# Patient Record
Sex: MALE | Race: BLACK OR AFRICAN AMERICAN | Employment: OTHER | URBAN - METROPOLITAN AREA
[De-identification: names, ages, dates, MRNs, and addresses within clinical notes are randomized per-mention and may not be internally consistent; named-entity substitution may affect disease eponyms.]

---

## 2017-01-06 PROBLEM — Z78.9 STATIN INTOLERANCE: Chronic | Status: ACTIVE | Noted: 2017-01-06

## 2017-09-30 ENCOUNTER — APPOINTMENT (OUTPATIENT)
Dept: GENERAL RADIOLOGY | Age: 72
End: 2017-09-30
Payer: MEDICARE

## 2017-09-30 ENCOUNTER — APPOINTMENT (OUTPATIENT)
Dept: CT IMAGING | Age: 72
End: 2017-09-30
Payer: MEDICARE

## 2017-09-30 ENCOUNTER — HOSPITAL ENCOUNTER (EMERGENCY)
Age: 72
Discharge: HOME | End: 2017-09-30
Payer: MEDICARE

## 2017-09-30 VITALS
RESPIRATION RATE: 18 BRPM | SYSTOLIC BLOOD PRESSURE: 188 MMHG | OXYGEN SATURATION: 95 % | WEIGHT: 210 LBS | BODY MASS INDEX: 30.06 KG/M2 | HEART RATE: 74 BPM | TEMPERATURE: 98.8 F | HEIGHT: 70 IN | DIASTOLIC BLOOD PRESSURE: 95 MMHG

## 2017-09-30 DIAGNOSIS — M54.89 OTHER BACK PAIN, UNSPECIFIED CHRONICITY: Primary | ICD-10-CM

## 2017-09-30 PROCEDURE — 99283 EMERGENCY DEPT VISIT LOW MDM: CPT

## 2017-09-30 PROCEDURE — 74011250636 HC RX REV CODE- 250/636

## 2017-09-30 PROCEDURE — 74011250637 HC RX REV CODE- 250/637

## 2017-09-30 PROCEDURE — 96372 THER/PROPH/DIAG INJ SC/IM: CPT

## 2017-09-30 PROCEDURE — 71020 XR CHEST PA LAT: CPT

## 2017-09-30 PROCEDURE — 74176 CT ABD & PELVIS W/O CONTRAST: CPT

## 2017-09-30 RX ORDER — METHOCARBAMOL 750 MG/1
750 TABLET, FILM COATED ORAL 4 TIMES DAILY
Qty: 12 TAB | Refills: 0 | Status: SHIPPED | OUTPATIENT
Start: 2017-09-30 | End: 2018-09-21

## 2017-09-30 RX ORDER — METHOCARBAMOL 100 MG/ML
1000 INJECTION, SOLUTION INTRAMUSCULAR; INTRAVENOUS
Status: DISCONTINUED | OUTPATIENT
Start: 2017-09-30 | End: 2017-09-30

## 2017-09-30 RX ORDER — KETOROLAC TROMETHAMINE 30 MG/ML
60 INJECTION, SOLUTION INTRAMUSCULAR; INTRAVENOUS
Status: COMPLETED | OUTPATIENT
Start: 2017-09-30 | End: 2017-09-30

## 2017-09-30 RX ORDER — METHOCARBAMOL 500 MG/1
1500 TABLET, FILM COATED ORAL
Status: COMPLETED | OUTPATIENT
Start: 2017-09-30 | End: 2017-09-30

## 2017-09-30 RX ADMIN — METHOCARBAMOL 1500 MG: 500 TABLET ORAL at 07:57

## 2017-09-30 RX ADMIN — KETOROLAC TROMETHAMINE 60 MG: 30 INJECTION, SOLUTION INTRAMUSCULAR at 07:36

## 2017-09-30 NOTE — ED TRIAGE NOTES
Pt arrives to ED complaining of middle back pain that started yesterday. Pt denies any injury, radicular symptoms, or SOB.

## 2017-09-30 NOTE — ED PROVIDER NOTES
HPI Comments: 72-year-old male complaining of left flank pain. Patient is started yesterday feels like a cold not in my back\". No fevers or chills no nausea vomiting or diarrhea and hematuria no history of kidney stones. No history of recent injury. Patient is a 73 y.o. male presenting with back pain. The history is provided by the patient. Back Pain    This is a new problem. The problem occurs constantly. Patient reports not work related injury. The pain is associated with no known injury. The pain is present in the right side. The quality of the pain is described as stabbing. The pain does not radiate. The pain is at a severity of 10/10. The pain is severe.         Past Medical History:   Diagnosis Date    Anemia, unspecified     Arthritis     RA; spine/ general joints    Diabetes ([address] dx 1995    type 2; checks BS in pm; denies hypo S/S; A1C in Nov '12- 7.8; qhlmv=005- 2 hrs pc (11/28/12)    Hypertension     well controlled with meds    Iron deficiency anemia secondary to inadequate dietary iron intake 3/6/2015    Rash and other nonspecific skin eruption     Rheumatoid arthritis ([address] 3/6/2015    Statin intolerance 1/6/2017    Type II diabetes mellitus, uncontrolled ([address] 8/17/2015    Type II or unspecified type diabetes mellitus without mention of complication, not stated as uncontrolled 3/6/2015    Type II or unspecified type diabetes mellitus without mention of complication, uncontrolled     Unspecified essential hypertension 3/9/2015       Past Surgical History:   Procedure Laterality Date    HX COLONOSCOPY      HX HEENT  age 17    R eye prosthesis    HX ORTHOPAEDIC      vert         Family History:   Problem Relation Age of Onset   Enzo Holms Arthritis-rheumatoid Mother     Cancer Father      Prostate CA    Cancer Sister      breast    Hypertension Brother     Diabetes Brother     Arthritis-osteo Brother     Arthritis-osteo Sister     Arthritis-osteo Sister        Social History     Social History    Marital status:      Spouse name: N/A    Number of children: N/A    Years of education: N/A     Occupational History    Not on file. Social History Main Topics    Smoking status: Never Smoker    Smokeless tobacco: Former User    Alcohol use No    Drug use: Yes     Special: Prescription, OTC    Sexual activity: Not on file     Other Topics Concern    Not on file     Social History Narrative         ALLERGIES: Advicor [niacin-lovastatin]; Lescol [fluvastatin]; and Lipitor [atorvastatin]    Review of Systems   Constitutional: Negative. Negative for activity change. HENT: Negative. Eyes: Negative. Respiratory: Negative. Cardiovascular: Negative. Gastrointestinal: Negative. Genitourinary: Negative. Musculoskeletal: Positive for back pain. Skin: Negative. Neurological: Negative. Psychiatric/Behavioral: Negative. All other systems reviewed and are negative. Vitals:    09/30/17 0535   BP: 161/84   Pulse: 78   Resp: 16   Temp: 98.8 °F (37.1 °C)   SpO2: 96%   Weight: 95.3 kg (210 lb)   Height: 5' 10\" (1.778 m)            Physical Exam   Constitutional: He is oriented to person, place, and time. He appears well-developed and well-nourished. No distress. HENT:   Head: Normocephalic and atraumatic. Right Ear: External ear normal.   Left Ear: External ear normal.   Nose: Nose normal.   Mouth/Throat: Oropharynx is clear and moist. No oropharyngeal exudate. Eyes: Conjunctivae and EOM are normal. Pupils are equal, round, and reactive to light. Right eye exhibits no discharge. Left eye exhibits no discharge. No scleral icterus. Neck: Normal range of motion. Neck supple. No JVD present. No tracheal deviation present. Cardiovascular: Normal rate, regular rhythm and intact distal pulses. Pulmonary/Chest: Effort normal and breath sounds normal. No stridor. No respiratory distress. He has no wheezes. He exhibits no tenderness. Abdominal: Soft.  Bowel sounds are normal. He exhibits no distension and no mass. There is no tenderness. Musculoskeletal: Normal range of motion. He exhibits no edema. Thoracic back: He exhibits tenderness and pain. Back:    Neurological: He is alert and oriented to person, place, and time. No cranial nerve deficit. Skin: Skin is warm and dry. No rash noted. He is not diaphoretic. No erythema. No pallor. Psychiatric: He has a normal mood and affect. His behavior is normal. Thought content normal.   Nursing note and vitals reviewed. MDM  Number of Diagnoses or Management Options  Other back pain, unspecified chronicity:   Diagnosis management comments: nno injury no signs of bony injury.        Amount and/or Complexity of Data Reviewed  Clinical lab tests: ordered and reviewed  Tests in the radiology section of CPT®: ordered and reviewed      ED Course       Procedures

## 2017-09-30 NOTE — ED NOTES
I have reviewed discharge instructions with the patient. The patient verbalized understanding. Patient left ED via Discharge Method: ambulatory to Home with wife. Opportunity for questions and clarification provided. Patient given 1 scripts.

## 2017-09-30 NOTE — ED NOTES
Pt resting in bed with family at bedside. Pt denies any additional needs at this time. Pt respirations even and unlabored.

## 2017-11-02 ENCOUNTER — PATIENT OUTREACH (OUTPATIENT)
Dept: CASE MANAGEMENT | Age: 72
End: 2017-11-02

## 2017-11-02 NOTE — PROGRESS NOTES
11/2/17 POC Discussed with Pt. pt verbalized that he hsa been doing well since he recieved help from [facility] to pay for his medication. nurse encourage to continue to take Vit C tabs to reduce the risk of sujatha the flu, understanding verbalized. will be seeing PCP in 3 weeks as verbalized by pt. This will be nurses last out reach, understanding verbalized.

## 2017-12-06 PROBLEM — R27.8 DECREASED DEXTERITY: Chronic | Status: ACTIVE | Noted: 2017-12-06

## 2018-03-14 PROBLEM — E11.40 TYPE 2 DIABETES MELLITUS WITH DIABETIC NEUROPATHY (HCC): Status: ACTIVE | Noted: 2018-03-14

## 2018-04-04 ENCOUNTER — HOSPITAL ENCOUNTER (OUTPATIENT)
Dept: CT IMAGING | Age: 73
Discharge: HOME | End: 2018-04-04
Payer: MEDICARE

## 2018-04-04 DIAGNOSIS — J98.6 ELEVATED DIAPHRAGM: ICD-10-CM

## 2018-04-04 DIAGNOSIS — R06.2 WHEEZING ON RIGHT SIDE OF CHEST ON EXHALATION: ICD-10-CM

## 2018-04-04 PROCEDURE — 71260 CT THORAX DX C+: CPT

## 2018-04-04 PROCEDURE — 74011000258 HC RX REV CODE- 258

## 2018-04-04 PROCEDURE — 74011636320 HC RX REV CODE- 636/320

## 2018-04-04 RX ORDER — SODIUM CHLORIDE 0.9 % (FLUSH) 0.9 %
10 SYRINGE (ML) INJECTION
Status: COMPLETED | OUTPATIENT
Start: 2018-04-04 | End: 2018-04-04

## 2018-04-04 RX ADMIN — SODIUM CHLORIDE 100 ML: 900 INJECTION, SOLUTION INTRAVENOUS at 08:52

## 2018-04-04 RX ADMIN — Medication 10 ML: at 08:52

## 2018-04-04 RX ADMIN — IOPAMIDOL 80 ML: 755 INJECTION, SOLUTION INTRAVENOUS at 08:52

## 2018-04-09 PROBLEM — J98.6 CHRONICALLY ELEVATED HEMIDIAPHRAGM: Status: ACTIVE | Noted: 2018-04-09

## 2018-04-09 PROBLEM — J98.6 CHRONICALLY ELEVATED HEMIDIAPHRAGM: Chronic | Status: ACTIVE | Noted: 2018-04-09

## 2018-04-27 ENCOUNTER — HOSPITAL ENCOUNTER (OUTPATIENT)
Dept: CT IMAGING | Age: 73
Discharge: HOME | End: 2018-04-27
Payer: MEDICARE

## 2018-04-27 DIAGNOSIS — N28.89 LEFT RENAL MASS: ICD-10-CM

## 2018-04-27 PROCEDURE — 74011636320 HC RX REV CODE- 636/320: Performed by: NURSE PRACTITIONER

## 2018-04-27 PROCEDURE — 74178 CT ABD&PLV WO CNTR FLWD CNTR: CPT

## 2018-04-27 PROCEDURE — 74011000258 HC RX REV CODE- 258: Performed by: NURSE PRACTITIONER

## 2018-04-27 RX ORDER — SODIUM CHLORIDE 0.9 % (FLUSH) 0.9 %
10 SYRINGE (ML) INJECTION
Status: COMPLETED | OUTPATIENT
Start: 2018-04-27 | End: 2018-04-27

## 2018-04-27 RX ADMIN — Medication 10 ML: at 08:19

## 2018-04-27 RX ADMIN — IOPAMIDOL 100 ML: 755 INJECTION, SOLUTION INTRAVENOUS at 08:19

## 2018-04-27 RX ADMIN — SODIUM CHLORIDE 100 ML: 900 INJECTION, SOLUTION INTRAVENOUS at 08:19

## 2018-05-01 PROBLEM — N28.89 RENAL MASS, LEFT: Status: ACTIVE | Noted: 2018-05-01

## 2018-05-01 PROBLEM — N28.89 RENAL MASS, LEFT: Chronic | Status: ACTIVE | Noted: 2018-05-01

## 2018-10-15 ENCOUNTER — HOSPITAL ENCOUNTER (EMERGENCY)
Age: 73
Discharge: HOME | End: 2018-10-15
Payer: MEDICARE

## 2018-10-15 ENCOUNTER — APPOINTMENT (OUTPATIENT)
Dept: GENERAL RADIOLOGY | Age: 73
End: 2018-10-15
Payer: MEDICARE

## 2018-10-15 VITALS
TEMPERATURE: 99.9 F | BODY MASS INDEX: 31.21 KG/M2 | HEART RATE: 97 BPM | SYSTOLIC BLOOD PRESSURE: 134 MMHG | WEIGHT: 218 LBS | DIASTOLIC BLOOD PRESSURE: 78 MMHG | RESPIRATION RATE: 18 BRPM | HEIGHT: 70 IN | OXYGEN SATURATION: 93 %

## 2018-10-15 DIAGNOSIS — K31.9 NSAID-ASSOCIATED GASTROPATHY: ICD-10-CM

## 2018-10-15 DIAGNOSIS — T39.395A NSAID-ASSOCIATED GASTROPATHY: ICD-10-CM

## 2018-10-15 DIAGNOSIS — E11.40 TYPE 2 DIABETES MELLITUS WITH DIABETIC NEUROPATHY, WITH LONG-TERM CURRENT USE OF INSULIN (HCC): ICD-10-CM

## 2018-10-15 DIAGNOSIS — Z79.4 TYPE 2 DIABETES MELLITUS WITH DIABETIC NEUROPATHY, WITH LONG-TERM CURRENT USE OF INSULIN (HCC): ICD-10-CM

## 2018-10-15 DIAGNOSIS — R10.13 ABDOMINAL PAIN, EPIGASTRIC: Primary | ICD-10-CM

## 2018-10-15 LAB
ALBUMIN SERPL-MCNC: 3.4 G/DL (ref 3.2–4.6)
ALBUMIN/GLOB SERPL: 1 {RATIO} (ref 1.2–3.5)
ALP SERPL-CCNC: 63 U/L (ref 50–136)
ALT SERPL-CCNC: 18 U/L (ref 12–65)
ANION GAP SERPL CALC-SCNC: 7 MMOL/L (ref 7–16)
AST SERPL-CCNC: 14 U/L (ref 15–37)
ATRIAL RATE: 96 BPM
BASOPHILS # BLD: 0.1 K/UL (ref 0–0.2)
BASOPHILS NFR BLD: 1 % (ref 0–2)
BILIRUB SERPL-MCNC: 0.5 MG/DL (ref 0.2–1.1)
BUN SERPL-MCNC: 12 MG/DL (ref 8–23)
CALCIUM SERPL-MCNC: 8.8 MG/DL (ref 8.3–10.4)
CALCULATED P AXIS, ECG09: 44 DEGREES
CALCULATED R AXIS, ECG10: -17 DEGREES
CALCULATED T AXIS, ECG11: 50 DEGREES
CHLORIDE SERPL-SCNC: 104 MMOL/L (ref 98–107)
CO2 SERPL-SCNC: 28 MMOL/L (ref 21–32)
CREAT SERPL-MCNC: 0.87 MG/DL (ref 0.8–1.5)
DIAGNOSIS, 93000: NORMAL
DIFFERENTIAL METHOD BLD: ABNORMAL
EOSINOPHIL # BLD: 0.1 K/UL (ref 0–0.8)
EOSINOPHIL NFR BLD: 1 % (ref 0.5–7.8)
ERYTHROCYTE [DISTWIDTH] IN BLOOD BY AUTOMATED COUNT: 13.3 %
GLOBULIN SER CALC-MCNC: 3.3 G/DL (ref 2.3–3.5)
GLUCOSE SERPL-MCNC: 182 MG/DL (ref 65–100)
HCT VFR BLD AUTO: 45.3 % (ref 41.1–50.3)
HGB BLD-MCNC: 14.6 G/DL (ref 13.6–17.2)
IMM GRANULOCYTES # BLD: 0 K/UL (ref 0–0.5)
IMM GRANULOCYTES NFR BLD AUTO: 0 % (ref 0–5)
LIPASE SERPL-CCNC: 79 U/L (ref 73–393)
LYMPHOCYTES # BLD: 1.2 K/UL (ref 0.5–4.6)
LYMPHOCYTES NFR BLD: 13 % (ref 13–44)
MCH RBC QN AUTO: 30 PG (ref 26.1–32.9)
MCHC RBC AUTO-ENTMCNC: 32.2 G/DL (ref 31.4–35)
MCV RBC AUTO: 93.2 FL (ref 79.6–97.8)
MONOCYTES # BLD: 0.6 K/UL (ref 0.1–1.3)
MONOCYTES NFR BLD: 6 % (ref 4–12)
NEUTS SEG # BLD: 7.3 K/UL (ref 1.7–8.2)
NEUTS SEG NFR BLD: 80 % (ref 43–78)
NRBC # BLD: 0 K/UL (ref 0–0.2)
P-R INTERVAL, ECG05: 142 MS
PLATELET # BLD AUTO: 389 K/UL (ref 150–450)
PMV BLD AUTO: 9.8 FL (ref 9.4–12.3)
POTASSIUM SERPL-SCNC: 4 MMOL/L (ref 3.5–5.1)
PROT SERPL-MCNC: 6.7 G/DL (ref 6.3–8.2)
Q-T INTERVAL, ECG07: 336 MS
QRS DURATION, ECG06: 74 MS
QTC CALCULATION (BEZET), ECG08: 424 MS
RBC # BLD AUTO: 4.86 M/UL (ref 4.23–5.6)
SODIUM SERPL-SCNC: 139 MMOL/L (ref 136–145)
TROPONIN I BLD-MCNC: 0 NG/ML (ref 0.02–0.05)
TROPONIN I BLD-MCNC: 0 NG/ML (ref 0.02–0.05)
VENTRICULAR RATE, ECG03: 96 BPM
WBC # BLD AUTO: 9.2 K/UL (ref 4.3–11.1)

## 2018-10-15 PROCEDURE — 71045 X-RAY EXAM CHEST 1 VIEW: CPT

## 2018-10-15 PROCEDURE — 93005 ELECTROCARDIOGRAM TRACING: CPT

## 2018-10-15 PROCEDURE — 99285 EMERGENCY DEPT VISIT HI MDM: CPT

## 2018-10-15 PROCEDURE — 96374 THER/PROPH/DIAG INJ IV PUSH: CPT

## 2018-10-15 PROCEDURE — 74011250636 HC RX REV CODE- 250/636

## 2018-10-15 PROCEDURE — 84484 ASSAY OF TROPONIN QUANT: CPT

## 2018-10-15 PROCEDURE — 96375 TX/PRO/DX INJ NEW DRUG ADDON: CPT

## 2018-10-15 PROCEDURE — 85025 COMPLETE CBC W/AUTO DIFF WBC: CPT

## 2018-10-15 PROCEDURE — 74011250637 HC RX REV CODE- 250/637

## 2018-10-15 PROCEDURE — C9113 INJ PANTOPRAZOLE SODIUM, VIA: HCPCS

## 2018-10-15 PROCEDURE — 74011000250 HC RX REV CODE- 250

## 2018-10-15 PROCEDURE — 80053 COMPREHEN METABOLIC PANEL: CPT

## 2018-10-15 PROCEDURE — 83690 ASSAY OF LIPASE: CPT

## 2018-10-15 RX ORDER — SUCRALFATE 1 G/10ML
1 SUSPENSION ORAL
Status: COMPLETED | OUTPATIENT
Start: 2018-10-15 | End: 2018-10-15

## 2018-10-15 RX ORDER — ONDANSETRON 2 MG/ML
4 INJECTION INTRAMUSCULAR; INTRAVENOUS
Status: COMPLETED | OUTPATIENT
Start: 2018-10-15 | End: 2018-10-15

## 2018-10-15 RX ORDER — PANTOPRAZOLE SODIUM 40 MG/1
40 TABLET, DELAYED RELEASE ORAL DAILY
Qty: 20 TAB | Refills: 0 | Status: SHIPPED | OUTPATIENT
Start: 2018-10-15 | End: 2018-10-15

## 2018-10-15 RX ORDER — PANTOPRAZOLE SODIUM 40 MG/1
40 TABLET, DELAYED RELEASE ORAL DAILY
Qty: 20 TAB | Refills: 0 | Status: SHIPPED | OUTPATIENT
Start: 2018-10-15 | End: 2018-11-04

## 2018-10-15 RX ADMIN — SUCRALFATE 1 G: 1 SUSPENSION ORAL at 06:34

## 2018-10-15 RX ADMIN — SODIUM CHLORIDE 40 MG: 9 INJECTION INTRAMUSCULAR; INTRAVENOUS; SUBCUTANEOUS at 06:34

## 2018-10-15 RX ADMIN — ONDANSETRON 4 MG: 2 INJECTION INTRAMUSCULAR; INTRAVENOUS at 06:34

## 2018-10-15 NOTE — ED PROVIDER NOTES
HPI Comments: 78year-old man complaining of epigastric abdominal pain. Patient has been using nonsteroidal anti-inflammatories daily. Patient has \"upset stomach. Patient is a 81 y.o. male presenting with abdominal pain and chest pain. The history is provided by the patient. Abdominal Pain This is a new problem. The current episode started 3 to 5 hours ago. The problem occurs constantly. The problem has not changed since onset. The pain is associated with an unknown factor. The pain is located in the epigastric region. The quality of the pain is burning. The pain is at a severity of 8/10. The pain is moderate. Associated symptoms include chest pain. Pertinent negatives include no nausea and no vomiting. Nothing worsens the pain. The pain is relieved by nothing. The patient's surgical history non-contributory. Chest Pain (Angina) Associated symptoms include abdominal pain. Pertinent negatives include no nausea and no vomiting. Past Medical History:  
Diagnosis Date  Anemia, unspecified  Arthritis RA; spine/ general joints  Diabetes ([address] dx 1995  
 type 2; checks BS in pm; denies hypo S/S; A1C in Nov '12- 7.8; qfitt=431- 2 hrs pc (11/28/12)  Hypertension   
 well controlled with meds  Iron deficiency anemia secondary to inadequate dietary iron intake 3/6/2015  Rash and other nonspecific skin eruption  Rheumatoid arthritis(714.0) 3/6/2015  Statin intolerance 1/6/2017  Type II diabetes mellitus, uncontrolled ([address] 8/17/2015  Type II or unspecified type diabetes mellitus without mention of complication, not stated as uncontrolled 3/6/2015  Type II or unspecified type diabetes mellitus without mention of complication, uncontrolled  Unspecified essential hypertension 3/9/2015 Past Surgical History:  
Procedure Laterality Date  HX COLONOSCOPY    
 HX HEENT  age 17  
 R eye prosthesis  HX ORTHOPAEDIC    
 vert Family History: Problem Relation Age of Onset  Arthritis-rheumatoid Mother  Cancer Father Prostate CA  Cancer Sister   
  breast  
 Hypertension Brother  Diabetes Brother  Arthritis-osteo Brother  Arthritis-osteo Sister  Arthritis-osteo Sister Social History Social History  Marital status:  Spouse name: N/A  
 Number of children: N/A  
 Years of education: N/A Occupational History  Not on file. Social History Main Topics  Smoking status: Never Smoker  Smokeless tobacco: Former User  Alcohol use No  
 Drug use: Yes Special: Prescription, OTC  Sexual activity: Not on file Other Topics Concern  Not on file Social History Narrative ALLERGIES: Advicor [niacin-lovastatin]; Lescol [fluvastatin]; and Lipitor [atorvastatin] Review of Systems Constitutional: Negative. Negative for activity change. HENT: Negative. Eyes: Negative. Respiratory: Negative. Cardiovascular: Positive for chest pain. Gastrointestinal: Positive for abdominal pain. Negative for nausea and vomiting. Genitourinary: Negative. Musculoskeletal: Negative. Skin: Negative. Neurological: Negative. Psychiatric/Behavioral: Negative. All other systems reviewed and are negative. Vitals:  
 10/15/18 4109 10/15/18 9372 10/15/18 0922 BP: 134/78 134/78 Pulse: 93 95 97 Resp: 18 18 18 Temp: 99.9 °F (37.7 °C) SpO2: 96% 96% 93% Weight: 98.9 kg (218 lb) Height: 5' 10\" (1.778 m) Physical Exam  
Constitutional: He is oriented to person, place, and time. He appears well-developed and well-nourished. No distress. HENT:  
Head: Normocephalic and atraumatic. Right Ear: External ear normal.  
Left Ear: External ear normal.  
Nose: Nose normal.  
Mouth/Throat: Oropharynx is clear and moist. No oropharyngeal exudate.   
Eyes: Conjunctivae and EOM are normal. Pupils are equal, round, and reactive to light. Right eye exhibits no discharge. Left eye exhibits no discharge. No scleral icterus. Neck: Normal range of motion. Neck supple. No JVD present. No tracheal deviation present. Cardiovascular: Normal rate, regular rhythm and intact distal pulses. Pulmonary/Chest: Effort normal and breath sounds normal. No stridor. No respiratory distress. He has no wheezes. He exhibits no tenderness. Abdominal: Soft. Bowel sounds are normal. He exhibits no distension and no mass. There is tenderness in the epigastric area. There is no rigidity, no rebound, no guarding, no tenderness at McBurney's point and negative Winn's sign. Musculoskeletal: Normal range of motion. He exhibits no edema or tenderness. Neurological: He is alert and oriented to person, place, and time. No cranial nerve deficit. Skin: Skin is warm and dry. No rash noted. He is not diaphoretic. No erythema. No pallor. Psychiatric: He has a normal mood and affect. His behavior is normal. Thought content normal.  
Nursing note and vitals reviewed. MDM Number of Diagnoses or Management Options Abdominal pain, epigastric:  
NSAID-associated gastropathy:  
Diagnosis management comments: Onset greater than 24 hours most likely associated with his nonsteroidal anti-inflammatory use. Place him on PPI and have him hold any further incident. Amount and/or Complexity of Data Reviewed Clinical lab tests: ordered and reviewed Tests in the radiology section of CPT®: ordered and reviewed Tests in the medicine section of CPT®: ordered and reviewed Risk of Complications, Morbidity, and/or Mortality Presenting problems: high Diagnostic procedures: high Management options: high ED Course Procedures

## 2018-10-15 NOTE — DISCHARGE INSTRUCTIONS
Indigestion (Dyspepsia or Heartburn): Care Instructions  Your Care Instructions  Sometimes it can be hard to pinpoint the cause of indigestion. (It is also called dyspepsia or heartburn.) Most cases of an upset stomach with bloating, burning, burping, and nausea are minor and go away within several hours. Home treatment and over-the-counter medicine often are able to control symptoms. But if you take medicine to relieve your indigestion without making diet and lifestyle changes, your symptoms are likely to return again and again. If you get indigestion often, it may be a sign of a more serious medical problem. Be sure to follow up with your doctor, who may want to do tests to be sure of the cause of your indigestion. Follow-up care is a key part of your treatment and safety. Be sure to make and go to all appointments, and call your doctor if you are having problems. It's also a good idea to know your test results and keep a list of the medicines you take. How can you care for yourself at home? · Your doctor may recommend over-the-counter medicine. For mild or occasional indigestion, antacids such as Gaviscon, Mylanta, Maalox, or Tums, may help. Be safe with medicines. Be careful when you take over-the-counter antacid medicines. Many of these medicines have aspirin in them. Read the label to make sure that you are not taking more than the recommended dose. Too much aspirin can be harmful. · Your doctor also may recommend over-the-counter acid reducers, such as Pepcid AC, Tagamet HB, Zantac 75, or Prilosec. Read and follow all instructions on the label. If you use these medicines often, talk with your doctor. · Change your eating habits. ¨ It's best to eat several small meals instead of two or three large meals. ¨ After you eat, wait 2 to 3 hours before you lie down. ¨ Chocolate, mint, and alcohol can make GERD worse.   ¨ Spicy foods, foods that have a lot of acid (like tomatoes and oranges), and coffee can make GERD symptoms worse in some people. If your symptoms are worse after you eat a certain food, you may want to stop eating that food to see if your symptoms get better. · Do not smoke or chew tobacco. Smoking can make GERD worse. If you need help quitting, talk to your doctor about stop-smoking programs and medicines. These can increase your chances of quitting for good. · If you have GERD symptoms at night, raise the head of your bed 6 to 8 inches. You can do this by putting the frame on blocks or placing a foam wedge under the head of your mattress. (Adding extra pillows does not work.)  · Do not wear tight clothing around your middle. · Lose weight if you need to. Losing just 5 to 10 pounds can help. · Do not take anti-inflammatory medicines, such as aspirin, ibuprofen (Advil, Motrin), or naproxen (Aleve). These can irritate the stomach. If you need a pain medicine, try acetaminophen (Tylenol), which does not cause stomach upset. When should you call for help? Call your doctor now or seek immediate medical care if:    · You have new or worse belly pain.     · You are vomiting.    Watch closely for changes in your health, and be sure to contact your doctor if:    · You have new or worse symptoms of indigestion.     · You have trouble or pain swallowing.     · You are losing weight.     · You do not get better as expected. Where can you learn more? Go to http://dania-jude.info/. Enter F519 in the search box to learn more about \"Indigestion (Dyspepsia or Heartburn): Care Instructions. \"  Current as of: March 28, 2018  Content Version: 11.8  © 5155-7323 [employer]. Care instructions adapted under license by [employer] (which disclaims liability or warranty for this information).  If you have questions about a medical condition or this instruction, always ask your healthcare professional. [address] any warranty or liability for your use of this information. Abdominal Pain: Care Instructions  Your Care Instructions    Abdominal pain has many possible causes. Some aren't serious and get better on their own in a few days. Others need more testing and treatment. If your pain continues or gets worse, you need to be rechecked and may need more tests to find out what is wrong. You may need surgery to correct the problem. Don't ignore new symptoms, such as fever, nausea and vomiting, urination problems, pain that gets worse, and dizziness. These may be signs of a more serious problem. Your doctor may have recommended a follow-up visit in the next 8 to 12 hours. If you are not getting better, you may need more tests or treatment. The doctor has checked you carefully, but problems can develop later. If you notice any problems or new symptoms, get medical treatment right away. Follow-up care is a key part of your treatment and safety. Be sure to make and go to all appointments, and call your doctor if you are having problems. It's also a good idea to know your test results and keep a list of the medicines you take. How can you care for yourself at home? · Rest until you feel better. · To prevent dehydration, drink plenty of fluids, enough so that your urine is light yellow or clear like water. Choose water and other caffeine-free clear liquids until you feel better. If you have kidney, heart, or liver disease and have to limit fluids, talk with your doctor before you increase the amount of fluids you drink. · If your stomach is upset, eat mild foods, such as rice, dry toast or crackers, bananas, and applesauce. Try eating several small meals instead of two or three large ones. · Wait until 48 hours after all symptoms have gone away before you have spicy foods, alcohol, and drinks that contain caffeine. · Do not eat foods that are high in fat.   · Avoid anti-inflammatory medicines such as aspirin, ibuprofen (Advil, Motrin), and naproxen (Aleve). These can cause stomach upset. Talk to your doctor if you take daily aspirin for another health problem. When should you call for help? Call 911 anytime you think you may need emergency care. For example, call if:    · You passed out (lost consciousness).     · You pass maroon or very bloody stools.     · You vomit blood or what looks like coffee grounds.     · You have new, severe belly pain.    Call your doctor now or seek immediate medical care if:    · Your pain gets worse, especially if it becomes focused in one area of your belly.     · You have a new or higher fever.     · Your stools are black and look like tar, or they have streaks of blood.     · You have unexpected vaginal bleeding.     · You have symptoms of a urinary tract infection. These may include:  ¨ Pain when you urinate. ¨ Urinating more often than usual.  ¨ Blood in your urine.     · You are dizzy or lightheaded, or you feel like you may faint.    Watch closely for changes in your health, and be sure to contact your doctor if:    · You are not getting better after 1 day (24 hours). Where can you learn more? Go to http://dania-jude.info/. Enter L333 in the search box to learn more about \"Abdominal Pain: Care Instructions. \"  Current as of: November 20, 2017  Content Version: 11.8  © 3326-8891 [employer]. Care instructions adapted under license by [employer] (which disclaims liability or warranty for this information). If you have questions about a medical condition or this instruction, always ask your healthcare professional. [place] [address] any warranty or liability for your use of this information.

## 2018-10-15 NOTE — ED TRIAGE NOTES
Pt has been having chest and abd pain since Saturday. States that he has been having nausea since Saturday no vomiting. Denies sob

## 2018-10-15 NOTE — ED NOTES
I have reviewed discharge instructions with the patient. The patient verbalized understanding. Patient left ED via Discharge Method: ambulatory to Home with self. Opportunity for questions and clarification provided. Patient given 1 scripts. To continue your aftercare when you leave the hospital, you may receive an automated call from our care team to check in on how you are doing. This is a free service and part of our promise to provide the best care and service to meet your aftercare needs.  If you have questions, or wish to unsubscribe from this service please call [phone]. Thank you for Choosing our [facility] Emergency Department.

## 2018-12-30 PROBLEM — E11.65 UNCONTROLLED TYPE 2 DIABETES MELLITUS WITH HYPERGLYCEMIA, WITH LONG-TERM CURRENT USE OF INSULIN (HCC): Status: ACTIVE | Noted: 2018-12-30

## 2018-12-30 PROBLEM — Z79.4 UNCONTROLLED TYPE 2 DIABETES MELLITUS WITH HYPERGLYCEMIA, WITH LONG-TERM CURRENT USE OF INSULIN (HCC): Status: ACTIVE | Noted: 2018-12-30

## 2018-12-30 PROBLEM — Z91.14 NON COMPLIANCE W MEDICATION REGIMEN: Status: ACTIVE | Noted: 2018-12-30

## 2019-05-23 ENCOUNTER — HOSPITAL ENCOUNTER (OUTPATIENT)
Dept: GENERAL RADIOLOGY | Age: 74
Discharge: HOME | End: 2019-05-23

## 2019-05-23 DIAGNOSIS — R05.8 PRODUCTIVE COUGH: ICD-10-CM

## 2019-09-04 ENCOUNTER — HOSPITAL ENCOUNTER (OUTPATIENT)
Dept: GENERAL RADIOLOGY | Age: 74
Discharge: HOME | End: 2019-09-04
Payer: MEDICARE

## 2019-09-04 DIAGNOSIS — M06.09 RHEUMATOID ARTHRITIS OF MULTIPLE SITES WITH NEGATIVE RHEUMATOID FACTOR (HCC): ICD-10-CM

## 2019-09-04 PROCEDURE — 73120 X-RAY EXAM OF HAND: CPT

## 2019-09-04 PROCEDURE — 73565 X-RAY EXAM OF KNEES: CPT

## 2019-09-04 PROCEDURE — 73030 X-RAY EXAM OF SHOULDER: CPT

## 2019-10-20 ENCOUNTER — APPOINTMENT (OUTPATIENT)
Dept: CT IMAGING | Age: 74
End: 2019-10-20
Attending: EMERGENCY MEDICINE
Payer: MEDICARE

## 2019-10-20 ENCOUNTER — HOSPITAL ENCOUNTER (EMERGENCY)
Age: 74
Discharge: HOME | End: 2019-10-20
Attending: EMERGENCY MEDICINE
Payer: MEDICARE

## 2019-10-20 VITALS
HEART RATE: 106 BPM | WEIGHT: 210 LBS | SYSTOLIC BLOOD PRESSURE: 163 MMHG | DIASTOLIC BLOOD PRESSURE: 79 MMHG | TEMPERATURE: 98.5 F | OXYGEN SATURATION: 97 % | RESPIRATION RATE: 16 BRPM | HEIGHT: 70 IN | BODY MASS INDEX: 30.06 KG/M2

## 2019-10-20 DIAGNOSIS — R10.9 RIGHT FLANK PAIN: Primary | ICD-10-CM

## 2019-10-20 LAB
ALBUMIN SERPL-MCNC: 3.9 G/DL (ref 3.2–4.6)
ALBUMIN/GLOB SERPL: 1.1 {RATIO} (ref 1.2–3.5)
ALP SERPL-CCNC: 64 U/L (ref 50–136)
ALT SERPL-CCNC: 27 U/L (ref 12–65)
ANION GAP SERPL CALC-SCNC: 5 MMOL/L (ref 7–16)
AST SERPL-CCNC: 12 U/L (ref 15–37)
BASOPHILS # BLD: 0 K/UL (ref 0–0.2)
BASOPHILS NFR BLD: 0 % (ref 0–2)
BILIRUB SERPL-MCNC: 0.4 MG/DL (ref 0.2–1.1)
BUN SERPL-MCNC: 18 MG/DL (ref 8–23)
CALCIUM SERPL-MCNC: 9.5 MG/DL (ref 8.3–10.4)
CHLORIDE SERPL-SCNC: 105 MMOL/L (ref 98–107)
CO2 SERPL-SCNC: 32 MMOL/L (ref 21–32)
CREAT SERPL-MCNC: 1.06 MG/DL (ref 0.8–1.5)
DIFFERENTIAL METHOD BLD: ABNORMAL
EOSINOPHIL # BLD: 0.1 K/UL (ref 0–0.8)
EOSINOPHIL NFR BLD: 1 % (ref 0.5–7.8)
ERYTHROCYTE [DISTWIDTH] IN BLOOD BY AUTOMATED COUNT: 14 % (ref 11.9–14.6)
GLOBULIN SER CALC-MCNC: 3.7 G/DL (ref 2.3–3.5)
GLUCOSE SERPL-MCNC: 132 MG/DL (ref 65–100)
HCT VFR BLD AUTO: 39.4 % (ref 41.1–50.3)
HGB BLD-MCNC: 12.5 G/DL (ref 13.6–17.2)
IMM GRANULOCYTES # BLD AUTO: 0 K/UL (ref 0–0.5)
IMM GRANULOCYTES NFR BLD AUTO: 0 % (ref 0–5)
LYMPHOCYTES # BLD: 1 K/UL (ref 0.5–4.6)
LYMPHOCYTES NFR BLD: 8 % (ref 13–44)
MCH RBC QN AUTO: 30.3 PG (ref 26.1–32.9)
MCHC RBC AUTO-ENTMCNC: 31.7 G/DL (ref 31.4–35)
MCV RBC AUTO: 95.6 FL (ref 79.6–97.8)
MONOCYTES # BLD: 0.5 K/UL (ref 0.1–1.3)
MONOCYTES NFR BLD: 5 % (ref 4–12)
NEUTS SEG # BLD: 9.8 K/UL (ref 1.7–8.2)
NEUTS SEG NFR BLD: 86 % (ref 43–78)
NRBC # BLD: 0 K/UL (ref 0–0.2)
PLATELET # BLD AUTO: 431 K/UL (ref 150–450)
PMV BLD AUTO: 9.5 FL (ref 9.4–12.3)
POTASSIUM SERPL-SCNC: 3.9 MMOL/L (ref 3.5–5.1)
PROT SERPL-MCNC: 7.6 G/DL (ref 6.3–8.2)
RBC # BLD AUTO: 4.12 M/UL (ref 4.23–5.6)
SODIUM SERPL-SCNC: 142 MMOL/L (ref 136–145)
WBC # BLD AUTO: 11.4 K/UL (ref 4.3–11.1)

## 2019-10-20 PROCEDURE — 81003 URINALYSIS AUTO W/O SCOPE: CPT | Performed by: EMERGENCY MEDICINE

## 2019-10-20 PROCEDURE — 74176 CT ABD & PELVIS W/O CONTRAST: CPT

## 2019-10-20 PROCEDURE — 99283 EMERGENCY DEPT VISIT LOW MDM: CPT | Performed by: EMERGENCY MEDICINE

## 2019-10-20 PROCEDURE — 80053 COMPREHEN METABOLIC PANEL: CPT

## 2019-10-20 PROCEDURE — 85025 COMPLETE CBC W/AUTO DIFF WBC: CPT

## 2019-10-20 NOTE — ED TRIAGE NOTES
Pt ambulatory to triage with cane. Pt reports right lower back pain and right flank x few days. Pt states he has burning upon urination and urinary frequency as well. Pt denies n/v/d or fever.

## 2019-10-20 NOTE — ED NOTES
I have reviewed discharge instructions with the patient. The patient verbalized understanding. Patient left ED via Discharge Method: ambulatory to Home with self. Opportunity for questions and clarification provided. Patient given 0 scripts. To continue your aftercare when you leave the hospital, you may receive an automated call from our care team to check in on how you are doing. This is a free service and part of our promise to provide the best care and service to meet your aftercare needs.  If you have questions, or wish to unsubscribe from this service please call [phone]. Thank you for Choosing our [facility] Emergency Department.

## 2019-10-20 NOTE — ED PROVIDER NOTES
Patient states he has been having right flank pain for the past several days. He also has been having urinary frequency and some mild dysuria. He denies any nausea or vomiting, denies a fever. He has had similar symptoms in the past with a urinary tract infection. He has not taken any medicine for his symptoms, denies any aggravating or alleviating factors. Past Medical History:   Diagnosis Date    Anemia, unspecified     Arthritis     RA; spine/ general joints    Diabetes ([facility] [address] dx 1995    type 2; checks BS in pm; denies hypo S/S; A1C in Nov '12- 7.8; bxykt=897- 2 hrs pc (11/28/12)    Hypertension     well controlled with meds    Iron deficiency anemia secondary to inadequate dietary iron intake 3/6/2015    Rash and other nonspecific skin eruption     Rheumatoid arthritis(714.0) 3/6/2015    Statin intolerance 1/6/2017    [facility], [facility] 8/17/2015    Type II or unspecified type diabetes mellitus without mention of complication, not stated as uncontrolled 3/6/2015    Type II or unspecified type diabetes mellitus without mention of complication, uncontrolled     Unspecified essential hypertension 3/9/2015       Past Surgical History:   Procedure Laterality Date    HX CERVICAL DISKECTOMY  11/30/2012    C3-C4;   Dr. Mackey Givens HX COLONOSCOPY      HX HEENT  age 17    R eye prosthesis         Family History:   Problem Relation Age of Onset    Arthritis-rheumatoid Mother     Cancer Father         Prostate CA    Cancer Sister         breast    Hypertension Brother     Diabetes Brother     Arthritis-osteo Brother     Arthritis-osteo Sister     Arthritis-osteo Sister        Social History     Socioeconomic History    Marital status:      Spouse name: Not on file    Number of children: Not on file    Years of education: Not on file    Highest education level: Not on file   Occupational History    Not on file   Social Needs    Financial resource strain: Not on file    Food insecurity:     Worry: Not on file     Inability: Not on file    Transportation needs:     Medical: Not on file     Non-medical: Not on file   Tobacco Use    Smoking status: Never Smoker    Smokeless tobacco: Former User   Substance and Sexual Activity    Alcohol use: No     Alcohol/week: 0.0 standard drinks    Drug use: Yes     Types: Prescription, OTC    Sexual activity: Not on file   Lifestyle    Physical activity:     Days per week: Not on file     Minutes per session: Not on file    Stress: Not on file   Relationships    Social connections:     Talks on phone: Not on file     Gets together: Not on file     Attends Sabianist service: Not on file     Active member of club or organization: Not on file     Attends meetings of clubs or organizations: Not on file     Relationship status: Not on file    Intimate partner violence:     Fear of current or ex partner: Not on file     Emotionally abused: Not on file     Physically abused: Not on file     Forced sexual activity: Not on file   Other Topics Concern    Not on file   Social History Narrative    Not on file         ALLERGIES: Advicor [niacin-lovastatin]; Lescol [fluvastatin]; and Lipitor [atorvastatin]    Review of Systems   Constitutional: Negative for chills and fever. Gastrointestinal: Negative for nausea and vomiting. All other systems reviewed and are negative. Vitals:    10/20/19 0849   BP: 163/79   Pulse: (!) 106   Resp: 16   Temp: 98.5 °F (36.9 °C)   SpO2: 97%   Weight: 95.3 kg (210 lb)   Height: 5' 10\" (1.778 m)            Physical Exam   Constitutional: He is oriented to person, place, and time. He appears well-developed and well-nourished. HENT:   Head: Normocephalic and atraumatic. Eyes: Pupils are equal, round, and reactive to light. Conjunctivae are normal.   Neck: Normal range of motion. Neck supple. Pulmonary/Chest: Effort normal. No respiratory distress. Abdominal: Soft. There is no tenderness. Musculoskeletal: Normal range of motion. He exhibits no edema. Neurological: He is alert and oriented to person, place, and time. Skin: Skin is warm and dry. Nursing note and vitals reviewed. MDM  Number of Diagnoses or Management Options  Right flank pain: new and does not require workup  Diagnosis management comments: 12:40 PM discussed results with patient, unremarkable labs and CT findings. He appears comfortable and in no distress, has a primary doctor he can follow-up with.        Amount and/or Complexity of Data Reviewed  Clinical lab tests: ordered and reviewed  Tests in the radiology section of CPT®: ordered and reviewed    Risk of Complications, Morbidity, and/or Mortality  Presenting problems: moderate  Diagnostic procedures: moderate  Management options: moderate    Patient Progress  Patient progress: stable         Procedures

## 2019-10-20 NOTE — PROGRESS NOTES
Visit with patient in ER.   Reviewed notes for spiritual concerns      Jj Álvarez, staff   C: [phone] /  [email or web address]

## 2020-03-09 ENCOUNTER — APPOINTMENT (OUTPATIENT)
Dept: MRI IMAGING | Age: 75
DRG: 935 | End: 2020-03-09
Attending: HOSPITALIST
Payer: MEDICARE

## 2020-03-09 ENCOUNTER — HOSPITAL ENCOUNTER (INPATIENT)
Age: 75
LOS: 2 days | Discharge: HOME | DRG: 935 | End: 2020-03-11
Attending: INTERNAL MEDICINE | Admitting: HOSPITALIST
Payer: MEDICARE

## 2020-03-09 PROBLEM — M86.171 OSTEOMYELITIS OF ANKLE OR FOOT, RIGHT, ACUTE (HCC): Status: ACTIVE | Noted: 2020-03-09

## 2020-03-09 LAB
ALBUMIN SERPL-MCNC: 3.8 G/DL (ref 3.2–4.6)
ALBUMIN/GLOB SERPL: 1 {RATIO} (ref 1.2–3.5)
ALP SERPL-CCNC: 63 U/L (ref 50–136)
ALT SERPL-CCNC: 27 U/L (ref 12–65)
ANION GAP SERPL CALC-SCNC: 5 MMOL/L (ref 7–16)
AST SERPL-CCNC: 16 U/L (ref 15–37)
BASOPHILS # BLD: 0.1 K/UL (ref 0–0.2)
BASOPHILS NFR BLD: 1 % (ref 0–2)
BILIRUB SERPL-MCNC: 0.3 MG/DL (ref 0.2–1.1)
BUN SERPL-MCNC: 16 MG/DL (ref 8–23)
CALCIUM SERPL-MCNC: 9.4 MG/DL (ref 8.3–10.4)
CHLORIDE SERPL-SCNC: 105 MMOL/L (ref 98–107)
CO2 SERPL-SCNC: 32 MMOL/L (ref 21–32)
CREAT SERPL-MCNC: 1.05 MG/DL (ref 0.8–1.5)
CRP SERPL-MCNC: <0.3 MG/DL (ref 0–0.9)
DIFFERENTIAL METHOD BLD: ABNORMAL
EOSINOPHIL # BLD: 0.1 K/UL (ref 0–0.8)
EOSINOPHIL NFR BLD: 1 % (ref 0.5–7.8)
ERYTHROCYTE [DISTWIDTH] IN BLOOD BY AUTOMATED COUNT: 13.9 % (ref 11.9–14.6)
ERYTHROCYTE [SEDIMENTATION RATE] IN BLOOD: 33 MM/HR (ref 0–20)
GLOBULIN SER CALC-MCNC: 3.8 G/DL (ref 2.3–3.5)
GLUCOSE BLD STRIP.AUTO-MCNC: 47 MG/DL (ref 65–100)
GLUCOSE SERPL-MCNC: 97 MG/DL (ref 65–100)
HCT VFR BLD AUTO: 36.1 % (ref 41.1–50.3)
HGB BLD-MCNC: 11.3 G/DL (ref 13.6–17.2)
IMM GRANULOCYTES # BLD AUTO: 0 K/UL (ref 0–0.5)
IMM GRANULOCYTES NFR BLD AUTO: 0 % (ref 0–5)
INR PPP: 0.9
LACTATE SERPL-SCNC: 2.9 MMOL/L (ref 0.4–2)
LYMPHOCYTES # BLD: 1.8 K/UL (ref 0.5–4.6)
LYMPHOCYTES NFR BLD: 18 % (ref 13–44)
MCH RBC QN AUTO: 30.5 PG (ref 26.1–32.9)
MCHC RBC AUTO-ENTMCNC: 31.3 G/DL (ref 31.4–35)
MCV RBC AUTO: 97.6 FL (ref 79.6–97.8)
MONOCYTES # BLD: 0.6 K/UL (ref 0.1–1.3)
MONOCYTES NFR BLD: 6 % (ref 4–12)
NEUTS SEG # BLD: 7.5 K/UL (ref 1.7–8.2)
NEUTS SEG NFR BLD: 75 % (ref 43–78)
NRBC # BLD: 0 K/UL (ref 0–0.2)
PLATELET # BLD AUTO: 372 K/UL (ref 150–450)
PMV BLD AUTO: 9.7 FL (ref 9.4–12.3)
POTASSIUM SERPL-SCNC: 4.4 MMOL/L (ref 3.5–5.1)
PROCALCITONIN SERPL-MCNC: <0.05 NG/ML
PROT SERPL-MCNC: 7.6 G/DL (ref 6.3–8.2)
PROTHROMBIN TIME: 12.6 SEC (ref 12–14.7)
RBC # BLD AUTO: 3.7 M/UL (ref 4.23–5.6)
SODIUM SERPL-SCNC: 142 MMOL/L (ref 136–145)
WBC # BLD AUTO: 10 K/UL (ref 4.3–11.1)

## 2020-03-09 PROCEDURE — 36415 COLL VENOUS BLD VENIPUNCTURE: CPT

## 2020-03-09 PROCEDURE — 86140 C-REACTIVE PROTEIN: CPT

## 2020-03-09 PROCEDURE — 85025 COMPLETE CBC W/AUTO DIFF WBC: CPT

## 2020-03-09 PROCEDURE — 87040 BLOOD CULTURE FOR BACTERIA: CPT

## 2020-03-09 PROCEDURE — 84145 PROCALCITONIN (PCT): CPT

## 2020-03-09 PROCEDURE — 74011000250 HC RX REV CODE- 250

## 2020-03-09 PROCEDURE — 83605 ASSAY OF LACTIC ACID: CPT

## 2020-03-09 PROCEDURE — 74011000302 HC RX REV CODE- 302: Performed by: HOSPITALIST

## 2020-03-09 PROCEDURE — 85652 RBC SED RATE AUTOMATED: CPT

## 2020-03-09 PROCEDURE — 77030027138 HC INCENT SPIROMETER -A

## 2020-03-09 PROCEDURE — A9575 INJ GADOTERATE MEGLUMI 0.1ML: HCPCS | Performed by: HOSPITALIST

## 2020-03-09 PROCEDURE — 86580 TB INTRADERMAL TEST: CPT | Performed by: HOSPITALIST

## 2020-03-09 PROCEDURE — 74011250636 HC RX REV CODE- 250/636: Performed by: HOSPITALIST

## 2020-03-09 PROCEDURE — 74011250637 HC RX REV CODE- 250/637: Performed by: HOSPITALIST

## 2020-03-09 PROCEDURE — 80053 COMPREHEN METABOLIC PANEL: CPT

## 2020-03-09 PROCEDURE — 65270000029 HC RM PRIVATE

## 2020-03-09 PROCEDURE — 82962 GLUCOSE BLOOD TEST: CPT

## 2020-03-09 PROCEDURE — 73720 MRI LWR EXTREMITY W/O&W/DYE: CPT

## 2020-03-09 PROCEDURE — 74011000258 HC RX REV CODE- 258: Performed by: HOSPITALIST

## 2020-03-09 PROCEDURE — 85610 PROTHROMBIN TIME: CPT

## 2020-03-09 PROCEDURE — 74011636637 HC RX REV CODE- 636/637: Performed by: HOSPITALIST

## 2020-03-09 PROCEDURE — 74011250637 HC RX REV CODE- 250/637: Performed by: INTERNAL MEDICINE

## 2020-03-09 RX ORDER — GADOTERATE MEGLUMINE 376.9 MG/ML
20 INJECTION INTRAVENOUS
Status: COMPLETED | OUTPATIENT
Start: 2020-03-09 | End: 2020-03-09

## 2020-03-09 RX ORDER — MORPHINE SULFATE 2 MG/ML
1 INJECTION, SOLUTION INTRAMUSCULAR; INTRAVENOUS
Status: DISCONTINUED | OUTPATIENT
Start: 2020-03-09 | End: 2020-03-11 | Stop reason: HOSPADM

## 2020-03-09 RX ORDER — INSULIN GLARGINE 100 [IU]/ML
20 INJECTION, SOLUTION SUBCUTANEOUS
Status: DISCONTINUED | OUTPATIENT
Start: 2020-03-09 | End: 2020-03-09

## 2020-03-09 RX ORDER — PREDNISONE 5 MG/1
5 TABLET ORAL DAILY
Status: DISCONTINUED | OUTPATIENT
Start: 2020-03-10 | End: 2020-03-11 | Stop reason: HOSPADM

## 2020-03-09 RX ORDER — VERAPAMIL HYDROCHLORIDE 120 MG/1
180 TABLET, FILM COATED, EXTENDED RELEASE ORAL
Status: DISCONTINUED | OUTPATIENT
Start: 2020-03-10 | End: 2020-03-11 | Stop reason: HOSPADM

## 2020-03-09 RX ORDER — HEPARIN SODIUM 5000 [USP'U]/ML
5000 INJECTION, SOLUTION INTRAVENOUS; SUBCUTANEOUS EVERY 8 HOURS
Status: DISCONTINUED | OUTPATIENT
Start: 2020-03-09 | End: 2020-03-11 | Stop reason: HOSPADM

## 2020-03-09 RX ORDER — DEXTROSE 50 % IN WATER (D50W) INTRAVENOUS SYRINGE
25 AS NEEDED
Status: DISCONTINUED | OUTPATIENT
Start: 2020-03-09 | End: 2020-03-11 | Stop reason: HOSPADM

## 2020-03-09 RX ORDER — SODIUM CHLORIDE 0.9 % (FLUSH) 0.9 %
5-40 SYRINGE (ML) INJECTION AS NEEDED
Status: DISCONTINUED | OUTPATIENT
Start: 2020-03-09 | End: 2020-03-11 | Stop reason: HOSPADM

## 2020-03-09 RX ORDER — ACETAMINOPHEN 325 MG/1
650 TABLET ORAL
Status: DISCONTINUED | OUTPATIENT
Start: 2020-03-09 | End: 2020-03-11 | Stop reason: HOSPADM

## 2020-03-09 RX ORDER — HYDROCODONE BITARTRATE AND ACETAMINOPHEN 5; 325 MG/1; MG/1
1 TABLET ORAL
Status: DISCONTINUED | OUTPATIENT
Start: 2020-03-09 | End: 2020-03-11 | Stop reason: HOSPADM

## 2020-03-09 RX ORDER — SODIUM CHLORIDE 0.9 % (FLUSH) 0.9 %
10 SYRINGE (ML) INJECTION
Status: COMPLETED | OUTPATIENT
Start: 2020-03-09 | End: 2020-03-09

## 2020-03-09 RX ORDER — SODIUM CHLORIDE 0.9 % (FLUSH) 0.9 %
5-40 SYRINGE (ML) INJECTION EVERY 8 HOURS
Status: DISCONTINUED | OUTPATIENT
Start: 2020-03-09 | End: 2020-03-11 | Stop reason: HOSPADM

## 2020-03-09 RX ORDER — DEXTROSE MONOHYDRATE 50 MG/ML
100 INJECTION, SOLUTION INTRAVENOUS CONTINUOUS
Status: DISCONTINUED | OUTPATIENT
Start: 2020-03-10 | End: 2020-03-10

## 2020-03-09 RX ORDER — INSULIN LISPRO 100 [IU]/ML
INJECTION, SOLUTION INTRAVENOUS; SUBCUTANEOUS EVERY 6 HOURS
Status: DISCONTINUED | OUTPATIENT
Start: 2020-03-09 | End: 2020-03-11

## 2020-03-09 RX ORDER — VANCOMYCIN/0.9 % SOD CHLORIDE 1.5G/250ML
1500 PLASTIC BAG, INJECTION (ML) INTRAVENOUS
Status: DISCONTINUED | OUTPATIENT
Start: 2020-03-10 | End: 2020-03-10

## 2020-03-09 RX ORDER — SODIUM CHLORIDE 9 MG/ML
125 INJECTION, SOLUTION INTRAVENOUS CONTINUOUS
Status: DISCONTINUED | OUTPATIENT
Start: 2020-03-09 | End: 2020-03-09

## 2020-03-09 RX ORDER — POLYETHYLENE GLYCOL 3350 17 G/17G
17 POWDER, FOR SOLUTION ORAL DAILY PRN
Status: DISCONTINUED | OUTPATIENT
Start: 2020-03-09 | End: 2020-03-11 | Stop reason: HOSPADM

## 2020-03-09 RX ORDER — DEXTROSE 50 % IN WATER (D50W) INTRAVENOUS SYRINGE
25
Status: COMPLETED | OUTPATIENT
Start: 2020-03-10 | End: 2020-03-09

## 2020-03-09 RX ORDER — CLINDAMYCIN PHOSPHATE 900 MG/50ML
900 INJECTION INTRAVENOUS EVERY 8 HOURS
Status: DISCONTINUED | OUTPATIENT
Start: 2020-03-09 | End: 2020-03-10

## 2020-03-09 RX ORDER — DIPHENHYDRAMINE HCL 25 MG
25 CAPSULE ORAL
Status: DISCONTINUED | OUTPATIENT
Start: 2020-03-09 | End: 2020-03-11 | Stop reason: HOSPADM

## 2020-03-09 RX ORDER — NALOXONE HYDROCHLORIDE 0.4 MG/ML
0.4 INJECTION, SOLUTION INTRAMUSCULAR; INTRAVENOUS; SUBCUTANEOUS AS NEEDED
Status: DISCONTINUED | OUTPATIENT
Start: 2020-03-09 | End: 2020-03-11 | Stop reason: HOSPADM

## 2020-03-09 RX ORDER — DEXTROSE 50 % IN WATER (D50W) INTRAVENOUS SYRINGE
Status: COMPLETED
Start: 2020-03-09 | End: 2020-03-09

## 2020-03-09 RX ORDER — ONDANSETRON 2 MG/ML
4 INJECTION INTRAMUSCULAR; INTRAVENOUS
Status: DISCONTINUED | OUTPATIENT
Start: 2020-03-09 | End: 2020-03-11 | Stop reason: HOSPADM

## 2020-03-09 RX ADMIN — VANCOMYCIN HYDROCHLORIDE 2500 MG: 10 INJECTION, POWDER, LYOPHILIZED, FOR SOLUTION INTRAVENOUS at 17:15

## 2020-03-09 RX ADMIN — CLINDAMYCIN PHOSPHATE 900 MG: 900 INJECTION, SOLUTION INTRAVENOUS at 16:43

## 2020-03-09 RX ADMIN — GADOTERATE MEGLUMINE 20 ML: 376.9 INJECTION INTRAVENOUS at 19:12

## 2020-03-09 RX ADMIN — Medication 10 ML: at 22:04

## 2020-03-09 RX ADMIN — TUBERCULIN PURIFIED PROTEIN DERIVATIVE 5 UNITS: 5 INJECTION, SOLUTION INTRADERMAL at 16:25

## 2020-03-09 RX ADMIN — SODIUM CHLORIDE 125 ML/HR: 900 INJECTION, SOLUTION INTRAVENOUS at 16:24

## 2020-03-09 RX ADMIN — HYDROCODONE BITARTRATE AND ACETAMINOPHEN 1 TABLET: 5; 325 TABLET ORAL at 17:22

## 2020-03-09 RX ADMIN — DEXTROSE 50 % IN WATER (D50W) INTRAVENOUS SYRINGE 25 G: at 23:54

## 2020-03-09 RX ADMIN — Medication 1 AMPULE: at 22:02

## 2020-03-09 RX ADMIN — HEPARIN SODIUM 5000 UNITS: 5000 INJECTION INTRAVENOUS; SUBCUTANEOUS at 16:25

## 2020-03-09 RX ADMIN — Medication 10 ML: at 16:26

## 2020-03-09 RX ADMIN — Medication 1 AMPULE: at 16:25

## 2020-03-09 RX ADMIN — INSULIN GLARGINE 20 UNITS: 100 INJECTION, SOLUTION SUBCUTANEOUS at 22:02

## 2020-03-09 RX ADMIN — Medication 10 ML: at 19:12

## 2020-03-09 RX ADMIN — PIPERACILLIN AND TAZOBACTAM 3.38 G: 3; .375 INJECTION, POWDER, FOR SOLUTION INTRAVENOUS at 19:00

## 2020-03-09 RX ADMIN — SODIUM CHLORIDE, SODIUM LACTATE, POTASSIUM CHLORIDE, AND CALCIUM CHLORIDE 1000 ML: 600; 310; 30; 20 INJECTION, SOLUTION INTRAVENOUS at 22:06

## 2020-03-09 NOTE — PROGRESS NOTES
Pharmacokinetic Consult to Pharmacist    Radha Noguera is a 69 y.o. male being treated for  osteomyelitis of the right foot with Cleocin, Zosyn, and Vancomycin. Lab Results   Component Value Date/Time    BUN 21 03/02/2020 09:11 AM    Creatinine 1.11 03/02/2020 09:11 AM    WBC 11.9 (H) 03/02/2020 09:11 AM      Estimated Creatinine Clearance: 71 mL/min (by C-G formula based on SCr of 1.11 mg/dL). CULTURES:  -    Day 1 of vancomycin. Goal trough is 15-20. Will start Vancomycin 2500mg IV x 1 followed by Vancomycin 1500mg IV q18h. Level before 3rd dose. Will continue to follow patient. Thank you,  JORGE Omer, PharmD

## 2020-03-09 NOTE — PROGRESS NOTES
END OF SHIFT NOTE:    INTAKE/OUTPUT  No intake/output data recorded. Voiding: YES  Catheter: NO  Drain:              Flatus: Patient does have flatus present. Stool:  0 occurrences. Characteristics:       Emesis: 0 occurrences. Characteristics:        VITAL SIGNS  Patient Vitals for the past 12 hrs:   Temp Pulse Resp BP SpO2   03/09/20 1408 98.1 °F (36.7 °C) 75 18 159/83 97 %       Pain Assessment  Pain Intensity 1: 6 (03/09/20 1722)  Pain Location 1: Leg     Patient Stated Pain Goal: 1    Ambulating  Yes    Shift report given to oncoming nurse at the bedside.     Dea Martinez
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
## 2020-03-09 NOTE — PROGRESS NOTES
03/09/20 1430   Dual Skin Pressure Injury Assessment   Dual Skin Pressure Injury Assessment WDL   Second Care Provider (Based on [address] [place] [address]) [id number] [facility]

## 2020-03-09 NOTE — PROGRESS NOTES
Care Management Interventions  PCP Verified by CM: Yes(Peter Gay)  Mode of Transport at Discharge: Other (see comment)(TBD based on need)  Transition of Care Consult (CM Consult): Discharge Planning  Discharge Durable Medical Equipment: No  Physical Therapy Consult: No  Occupational Therapy Consult: No  Speech Therapy Consult: No  Current Support Network: Own Home, Lives with Spouse  Confirm Follow Up Transport: Other (see comment)(TBD based on need)  [employer] Provided?: No  Discharge Location  Discharge Placement: Unable to determine at this time      This CM met with pt at bedside this day to complete assessment. Pt verified his PCP, insurance, emergency contact, and home address. He reports no difficulty obtaining his medications in the community. He lives at home with spouse with 1 step to enter. His home DME includes a cane and walker. He confirms that at baseline prior to admission he is independent with his ADLs including bathing, dressing, cooking, and driving. Discharge plan pending clinical course. No CM needs voiced at this time. Will continue to follow.

## 2020-03-10 ENCOUNTER — APPOINTMENT (OUTPATIENT)
Dept: ULTRASOUND IMAGING | Age: 75
DRG: 935 | End: 2020-03-10
Attending: HOSPITALIST
Payer: MEDICARE

## 2020-03-10 LAB
ALBUMIN SERPL-MCNC: 3.2 G/DL (ref 3.2–4.6)
ALBUMIN/GLOB SERPL: 1.1 {RATIO} (ref 1.2–3.5)
ALP SERPL-CCNC: 48 U/L (ref 50–136)
ALT SERPL-CCNC: 22 U/L (ref 12–65)
ANION GAP SERPL CALC-SCNC: 6 MMOL/L (ref 7–16)
AST SERPL-CCNC: 15 U/L (ref 15–37)
BASOPHILS # BLD: 0 K/UL (ref 0–0.2)
BASOPHILS NFR BLD: 1 % (ref 0–2)
BILIRUB SERPL-MCNC: 0.5 MG/DL (ref 0.2–1.1)
BUN SERPL-MCNC: 13 MG/DL (ref 8–23)
CALCIUM SERPL-MCNC: 8.3 MG/DL (ref 8.3–10.4)
CHLORIDE SERPL-SCNC: 105 MMOL/L (ref 98–107)
CO2 SERPL-SCNC: 32 MMOL/L (ref 21–32)
CREAT SERPL-MCNC: 1 MG/DL (ref 0.8–1.5)
DIFFERENTIAL METHOD BLD: ABNORMAL
EOSINOPHIL # BLD: 0.2 K/UL (ref 0–0.8)
EOSINOPHIL NFR BLD: 2 % (ref 0.5–7.8)
ERYTHROCYTE [DISTWIDTH] IN BLOOD BY AUTOMATED COUNT: 13.8 % (ref 11.9–14.6)
GLOBULIN SER CALC-MCNC: 3 G/DL (ref 2.3–3.5)
GLUCOSE BLD STRIP.AUTO-MCNC: 114 MG/DL (ref 65–100)
GLUCOSE BLD STRIP.AUTO-MCNC: 71 MG/DL (ref 65–100)
GLUCOSE BLD STRIP.AUTO-MCNC: 96 MG/DL (ref 65–100)
GLUCOSE SERPL-MCNC: 74 MG/DL (ref 65–100)
HCT VFR BLD AUTO: 30.7 % (ref 41.1–50.3)
HGB BLD-MCNC: 9.7 G/DL (ref 13.6–17.2)
IMM GRANULOCYTES # BLD AUTO: 0 K/UL (ref 0–0.5)
IMM GRANULOCYTES NFR BLD AUTO: 0 % (ref 0–5)
LACTATE SERPL-SCNC: 1.2 MMOL/L (ref 0.4–2)
LYMPHOCYTES # BLD: 2.8 K/UL (ref 0.5–4.6)
LYMPHOCYTES NFR BLD: 31 % (ref 13–44)
MCH RBC QN AUTO: 30.4 PG (ref 26.1–32.9)
MCHC RBC AUTO-ENTMCNC: 31.6 G/DL (ref 31.4–35)
MCV RBC AUTO: 96.2 FL (ref 79.6–97.8)
MM INDURATION POC: 0 MM (ref 0–5)
MONOCYTES # BLD: 0.6 K/UL (ref 0.1–1.3)
MONOCYTES NFR BLD: 6 % (ref 4–12)
NEUTS SEG # BLD: 5.3 K/UL (ref 1.7–8.2)
NEUTS SEG NFR BLD: 60 % (ref 43–78)
NRBC # BLD: 0 K/UL (ref 0–0.2)
PLATELET # BLD AUTO: 317 K/UL (ref 150–450)
PMV BLD AUTO: 9.4 FL (ref 9.4–12.3)
POTASSIUM SERPL-SCNC: 3.6 MMOL/L (ref 3.5–5.1)
PPD POC: NEGATIVE NEGATIVE
PROT SERPL-MCNC: 6.2 G/DL (ref 6.3–8.2)
RBC # BLD AUTO: 3.19 M/UL (ref 4.23–5.6)
SODIUM SERPL-SCNC: 143 MMOL/L (ref 136–145)
WBC # BLD AUTO: 8.8 K/UL (ref 4.3–11.1)

## 2020-03-10 PROCEDURE — 82962 GLUCOSE BLOOD TEST: CPT

## 2020-03-10 PROCEDURE — 74011250636 HC RX REV CODE- 250/636: Performed by: HOSPITALIST

## 2020-03-10 PROCEDURE — 74011250637 HC RX REV CODE- 250/637: Performed by: HOSPITALIST

## 2020-03-10 PROCEDURE — 36415 COLL VENOUS BLD VENIPUNCTURE: CPT

## 2020-03-10 PROCEDURE — 97161 PT EVAL LOW COMPLEX 20 MIN: CPT

## 2020-03-10 PROCEDURE — 74011000258 HC RX REV CODE- 258: Performed by: HOSPITALIST

## 2020-03-10 PROCEDURE — 74011250636 HC RX REV CODE- 250/636: Performed by: INTERNAL MEDICINE

## 2020-03-10 PROCEDURE — 85025 COMPLETE CBC W/AUTO DIFF WBC: CPT

## 2020-03-10 PROCEDURE — 74011250637 HC RX REV CODE- 250/637: Performed by: INTERNAL MEDICINE

## 2020-03-10 PROCEDURE — 51798 US URINE CAPACITY MEASURE: CPT

## 2020-03-10 PROCEDURE — 74011636637 HC RX REV CODE- 636/637: Performed by: HOSPITALIST

## 2020-03-10 PROCEDURE — 80053 COMPREHEN METABOLIC PANEL: CPT

## 2020-03-10 PROCEDURE — 77030040830 HC CATH URETH FOL MDII -A

## 2020-03-10 PROCEDURE — 77030019905 HC CATH URETH INTMIT MDII -A

## 2020-03-10 PROCEDURE — 97165 OT EVAL LOW COMPLEX 30 MIN: CPT

## 2020-03-10 PROCEDURE — 93922 UPR/L XTREMITY ART 2 LEVELS: CPT

## 2020-03-10 PROCEDURE — 97535 SELF CARE MNGMENT TRAINING: CPT

## 2020-03-10 PROCEDURE — 65270000029 HC RM PRIVATE

## 2020-03-10 RX ORDER — INSULIN GLARGINE 100 [IU]/ML
10 INJECTION, SOLUTION SUBCUTANEOUS
Status: DISCONTINUED | OUTPATIENT
Start: 2020-03-10 | End: 2020-03-11 | Stop reason: HOSPADM

## 2020-03-10 RX ORDER — TAMSULOSIN HYDROCHLORIDE 0.4 MG/1
0.4 CAPSULE ORAL DAILY
Status: DISCONTINUED | OUTPATIENT
Start: 2020-03-10 | End: 2020-03-11 | Stop reason: HOSPADM

## 2020-03-10 RX ORDER — SODIUM CHLORIDE 9 MG/ML
100 INJECTION, SOLUTION INTRAVENOUS CONTINUOUS
Status: DISCONTINUED | OUTPATIENT
Start: 2020-03-10 | End: 2020-03-11

## 2020-03-10 RX ADMIN — SODIUM CHLORIDE 100 ML/HR: 900 INJECTION, SOLUTION INTRAVENOUS at 11:45

## 2020-03-10 RX ADMIN — PIPERACILLIN AND TAZOBACTAM 3.38 G: 3; .375 INJECTION, POWDER, FOR SOLUTION INTRAVENOUS at 09:50

## 2020-03-10 RX ADMIN — CLINDAMYCIN PHOSPHATE 900 MG: 900 INJECTION, SOLUTION INTRAVENOUS at 01:00

## 2020-03-10 RX ADMIN — PREDNISONE 5 MG: 5 TABLET ORAL at 08:53

## 2020-03-10 RX ADMIN — CLINDAMYCIN PHOSPHATE 900 MG: 900 INJECTION, SOLUTION INTRAVENOUS at 08:56

## 2020-03-10 RX ADMIN — Medication 10 ML: at 14:06

## 2020-03-10 RX ADMIN — VANCOMYCIN HYDROCHLORIDE 1500 MG: 10 INJECTION, POWDER, LYOPHILIZED, FOR SOLUTION INTRAVENOUS at 11:42

## 2020-03-10 RX ADMIN — Medication 1 AMPULE: at 08:52

## 2020-03-10 RX ADMIN — HEPARIN SODIUM 5000 UNITS: 5000 INJECTION INTRAVENOUS; SUBCUTANEOUS at 16:54

## 2020-03-10 RX ADMIN — Medication 10 ML: at 21:47

## 2020-03-10 RX ADMIN — TAMSULOSIN HYDROCHLORIDE 0.4 MG: 0.4 CAPSULE ORAL at 11:39

## 2020-03-10 RX ADMIN — MORPHINE SULFATE 1 MG: 2 INJECTION, SOLUTION INTRAMUSCULAR; INTRAVENOUS at 21:51

## 2020-03-10 RX ADMIN — POLYETHYLENE GLYCOL 3350 17 G: 17 POWDER, FOR SOLUTION ORAL at 17:06

## 2020-03-10 RX ADMIN — HEPARIN SODIUM 5000 UNITS: 5000 INJECTION INTRAVENOUS; SUBCUTANEOUS at 08:52

## 2020-03-10 RX ADMIN — MULTIPLE VITAMINS W/ MINERALS TAB 1 TABLET: TAB at 08:53

## 2020-03-10 RX ADMIN — Medication 10 ML: at 06:10

## 2020-03-10 RX ADMIN — Medication 1 AMPULE: at 21:47

## 2020-03-10 RX ADMIN — DEXTROSE MONOHYDRATE 100 ML/HR: 5 INJECTION, SOLUTION INTRAVENOUS at 00:00

## 2020-03-10 RX ADMIN — PIPERACILLIN AND TAZOBACTAM 3.38 G: 3; .375 INJECTION, POWDER, FOR SOLUTION INTRAVENOUS at 02:25

## 2020-03-10 RX ADMIN — VERAPAMIL HYDROCHLORIDE 180 MG: 120 TABLET, FILM COATED, EXTENDED RELEASE ORAL at 08:52

## 2020-03-10 RX ADMIN — HEPARIN SODIUM 5000 UNITS: 5000 INJECTION INTRAVENOUS; SUBCUTANEOUS at 02:25

## 2020-03-10 NOTE — PROGRESS NOTES
This CM met with pt at bedside this day to complete CM consult and discuss discharge planning. Per pt, at this time he plans on returning home with spouse when stable for discharge. This CM discussed with pt the recommendation from hospital wound care nurse that pt would benefit from RN home health to assist with wound care dressing changes. Pt declines home health and reports that he and is wife are able to do his dressing changes at the house and he does not feel he needs home health services. No additional CM needs voiced at this time. Will continue to follow.

## 2020-03-10 NOTE — PROGRESS NOTES
Pt c/o inability to urinate this AM and in pain,  Bladder scanned and showed 506 ML, received new order for in and out cath with 475 out of narda urine, pt with good relief and tolerated well. Pt a few hours later again c/o inability to urinate and pain, bladder scanned and showed 397 ml, new order received to place salazar, 475 ml out of bloody urine.   Pt tolerated well with good relief
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
## 2020-03-10 NOTE — PROGRESS NOTES
END OF SHIFT NOTE:    INTAKE/OUTPUT  03/09 0701 - 03/10 0700  In: 1680 [I.V.:1680]  Out: 675 [Urine:675]  Voiding: NO  Catheter: YES  Drain:              Flatus: Patient does have flatus present. Stool:  0 occurrences. Characteristics:       Emesis: 0 occurrences. Characteristics:        VITAL SIGNS  Patient Vitals for the past 12 hrs:   Temp Pulse Resp BP SpO2   03/10/20 1514 98.4 °F (36.9 °C) 80 18 138/68 96 %   03/10/20 1145 98.4 °F (36.9 °C) 73 18 123/66 94 %   03/10/20 0733 98.2 °F (36.8 °C) 84 18 180/87 93 %       Pain Assessment  Pain Intensity 1: 0 (03/10/20 1345)  Pain Location 1: Leg     Patient Stated Pain Goal: 0    Ambulating  Yes    Shift report given to oncoming nurse at the bedside.     Jody Umanzor RN
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
## 2020-03-10 NOTE — PROGRESS NOTES
Patient with symptomatic hypoglycemia of 47. He received d50 x 1. He received lantus 20 units last night for type 2 DM regimen.     Plan:  Stop Lantus  Continue glucose q 2hr  Start d5 ivf  Monitor

## 2020-03-10 NOTE — PROGRESS NOTES
END OF SHIFT NOTE:    INTAKE/OUTPUT  03/09 0701 - 03/10 0700  In: 1680 [I.V.:1680]  Out: 675 [Urine:675]  Voiding: YES  Catheter: NO  Drain:              Flatus: Patient does have flatus present. Stool:  0 occurrences. Characteristics:       Emesis: 0 occurrences. Characteristics:        VITAL SIGNS  Patient Vitals for the past 12 hrs:   Temp Pulse Resp BP SpO2   03/10/20 0400 98.3 °F (36.8 °C) 73 18 132/83 96 %   03/09/20 2323 98 °F (36.7 °C) 83 18 140/80 95 %   03/09/20 2017 98.5 °F (36.9 °C) 76 18 133/73 97 %       Pain Assessment  Pain Intensity 1: 0 (03/10/20 0225)  Pain Location 1: Leg     Patient Stated Pain Goal: 0    Ambulating  Yes  Dressing applied to right ankle    Shift report given to oncoming nurse at the bedside.     Anca Berger
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
## 2020-03-11 VITALS
RESPIRATION RATE: 18 BRPM | DIASTOLIC BLOOD PRESSURE: 76 MMHG | HEART RATE: 103 BPM | SYSTOLIC BLOOD PRESSURE: 137 MMHG | TEMPERATURE: 99.1 F | OXYGEN SATURATION: 96 %

## 2020-03-11 PROBLEM — S81.809A OPEN WOUND OF LOWER EXTREMITY: Status: ACTIVE | Noted: 2020-03-11

## 2020-03-11 PROBLEM — M86.171 OSTEOMYELITIS OF ANKLE OR FOOT, RIGHT, ACUTE (HCC): Status: RESOLVED | Noted: 2020-03-09 | Resolved: 2020-03-11

## 2020-03-11 LAB
ANION GAP SERPL CALC-SCNC: 4 MMOL/L (ref 7–16)
BASOPHILS # BLD: 0.1 K/UL (ref 0–0.2)
BASOPHILS NFR BLD: 1 % (ref 0–2)
BUN SERPL-MCNC: 11 MG/DL (ref 8–23)
CALCIUM SERPL-MCNC: 8.7 MG/DL (ref 8.3–10.4)
CHLORIDE SERPL-SCNC: 112 MMOL/L (ref 98–107)
CO2 SERPL-SCNC: 30 MMOL/L (ref 21–32)
CREAT SERPL-MCNC: 1.03 MG/DL (ref 0.8–1.5)
DIFFERENTIAL METHOD BLD: ABNORMAL
EOSINOPHIL # BLD: 0.1 K/UL (ref 0–0.8)
EOSINOPHIL NFR BLD: 1 % (ref 0.5–7.8)
ERYTHROCYTE [DISTWIDTH] IN BLOOD BY AUTOMATED COUNT: 14 % (ref 11.9–14.6)
GLUCOSE BLD STRIP.AUTO-MCNC: 114 MG/DL (ref 65–100)
GLUCOSE BLD STRIP.AUTO-MCNC: 75 MG/DL (ref 65–100)
GLUCOSE BLD STRIP.AUTO-MCNC: 89 MG/DL (ref 65–100)
GLUCOSE SERPL-MCNC: 109 MG/DL (ref 65–100)
HCT VFR BLD AUTO: 31.6 % (ref 41.1–50.3)
HGB BLD-MCNC: 10.4 G/DL (ref 13.6–17.2)
IMM GRANULOCYTES # BLD AUTO: 0 K/UL (ref 0–0.5)
IMM GRANULOCYTES NFR BLD AUTO: 0 % (ref 0–5)
LYMPHOCYTES # BLD: 2.2 K/UL (ref 0.5–4.6)
LYMPHOCYTES NFR BLD: 22 % (ref 13–44)
MAGNESIUM SERPL-MCNC: 2 MG/DL (ref 1.8–2.4)
MCH RBC QN AUTO: 31.1 PG (ref 26.1–32.9)
MCHC RBC AUTO-ENTMCNC: 32.9 G/DL (ref 31.4–35)
MCV RBC AUTO: 94.6 FL (ref 79.6–97.8)
MONOCYTES # BLD: 0.6 K/UL (ref 0.1–1.3)
MONOCYTES NFR BLD: 7 % (ref 4–12)
NEUTS SEG # BLD: 6.8 K/UL (ref 1.7–8.2)
NEUTS SEG NFR BLD: 69 % (ref 43–78)
NRBC # BLD: 0 K/UL (ref 0–0.2)
PLATELET # BLD AUTO: 307 K/UL (ref 150–450)
PMV BLD AUTO: 9.3 FL (ref 9.4–12.3)
POTASSIUM SERPL-SCNC: 3.8 MMOL/L (ref 3.5–5.1)
RBC # BLD AUTO: 3.34 M/UL (ref 4.23–5.6)
SODIUM SERPL-SCNC: 146 MMOL/L (ref 136–145)
WBC # BLD AUTO: 9.8 K/UL (ref 4.3–11.1)

## 2020-03-11 PROCEDURE — 74011250637 HC RX REV CODE- 250/637: Performed by: HOSPITALIST

## 2020-03-11 PROCEDURE — 74011250636 HC RX REV CODE- 250/636: Performed by: HOSPITALIST

## 2020-03-11 PROCEDURE — 80048 BASIC METABOLIC PNL TOTAL CA: CPT

## 2020-03-11 PROCEDURE — 36415 COLL VENOUS BLD VENIPUNCTURE: CPT

## 2020-03-11 PROCEDURE — 51798 US URINE CAPACITY MEASURE: CPT

## 2020-03-11 PROCEDURE — 74011250637 HC RX REV CODE- 250/637: Performed by: INTERNAL MEDICINE

## 2020-03-11 PROCEDURE — 83735 ASSAY OF MAGNESIUM: CPT

## 2020-03-11 PROCEDURE — 97116 GAIT TRAINING THERAPY: CPT

## 2020-03-11 PROCEDURE — 74011636637 HC RX REV CODE- 636/637: Performed by: HOSPITALIST

## 2020-03-11 PROCEDURE — 85025 COMPLETE CBC W/AUTO DIFF WBC: CPT

## 2020-03-11 RX ORDER — INSULIN LISPRO 100 [IU]/ML
INJECTION, SOLUTION INTRAVENOUS; SUBCUTANEOUS
Status: DISCONTINUED | OUTPATIENT
Start: 2020-03-11 | End: 2020-03-11 | Stop reason: HOSPADM

## 2020-03-11 RX ORDER — TAMSULOSIN HYDROCHLORIDE 0.4 MG/1
0.4 CAPSULE ORAL DAILY
Qty: 15 CAP | Refills: 0 | Status: SHIPPED | OUTPATIENT
Start: 2020-03-12 | End: 2020-03-19 | Stop reason: SDUPTHER

## 2020-03-11 RX ADMIN — TAMSULOSIN HYDROCHLORIDE 0.4 MG: 0.4 CAPSULE ORAL at 08:42

## 2020-03-11 RX ADMIN — Medication 1 AMPULE: at 08:41

## 2020-03-11 RX ADMIN — PREDNISONE 5 MG: 5 TABLET ORAL at 08:42

## 2020-03-11 RX ADMIN — SODIUM CHLORIDE 100 ML/HR: 900 INJECTION, SOLUTION INTRAVENOUS at 00:07

## 2020-03-11 RX ADMIN — MULTIPLE VITAMINS W/ MINERALS TAB 1 TABLET: TAB at 08:41

## 2020-03-11 RX ADMIN — HEPARIN SODIUM 5000 UNITS: 5000 INJECTION INTRAVENOUS; SUBCUTANEOUS at 08:42

## 2020-03-11 RX ADMIN — VERAPAMIL HYDROCHLORIDE 180 MG: 120 TABLET, FILM COATED, EXTENDED RELEASE ORAL at 08:42

## 2020-03-11 RX ADMIN — Medication 10 ML: at 05:20

## 2020-03-11 RX ADMIN — Medication 10 ML: at 13:49

## 2020-03-11 NOTE — PROGRESS NOTES
Patient refused the Lantus due to not having appetite and didn't eat. Patient didn't want blood sugar to drop as did yesterday. Md notified. Md also notified of patient having discomfort with the salazar in place. Nurse asked for order to irrigate if pain medication does not help. Noted some small clots in salazar line. Awaiting return feedback.

## 2020-03-11 NOTE — DISCHARGE INSTRUCTIONS
Right ankle cleanse with dermal wound , apply xeroform and abd, nicolás change daily. Home instructions: cleanse with dermal wound , apply xeroform and dry dressing change 3 times per week Do not get wet. Wound Care: After Your Visit  Your Care Instructions  Taking good care of your wound at home will help it heal quickly and reduce your chance of infection. The doctor has checked you carefully, but problems can develop later. If you notice any problems or new symptoms, get medical treatment right away. Follow-up care is a key part of your treatment and safety. Be sure to make and go to all appointments, and call your doctor if you are having problems. It's also a good idea to know your test results and keep a list of the medicines you take. How can you care for yourself at home? · Clean the area with soap and water 2 times a day unless your doctor gives you different instructions. Don't use hydrogen peroxide or alcohol, which can slow healing. ¨ You may cover the wound with a thin layer of antibiotic ointment, such as bacitracin, and a nonstick bandage. ¨ Apply more ointment and replace the bandage as needed. · Take pain medicines exactly as directed. Some pain is normal with a wound, but do not ignore pain that is getting worse instead of better. You could have an infection. ¨ If the doctor gave you a prescription medicine for pain, take it as prescribed. ¨ If you are not taking a prescription pain medicine, ask your doctor if you can take an over-the-counter medicine. · Your doctor may have closed your wound with stitches (sutures), staples, or skin glue. ¨ If you have stitches, your doctor may remove them after several days to 2 weeks. Or you may have stitches that dissolve on their own. ¨ If you have staples, your doctor may remove them after 7 to 10 days. ¨ If your wound was closed with skin glue, the glue will wear off in a few days to 2 weeks.   When should you call for help?  Call your doctor now or seek immediate medical care if:  · You have signs of infection, such as:  ¨ Increased pain, swelling, warmth, or redness near the wound. ¨ Red streaks leading from the wound. ¨ Pus draining from the wound. ¨ A fever. · You bleed so much from your incision that you soak one or more bandages over 2 to 4 hours. Watch closely for changes in your health, and be sure to contact your doctor if:  · The wound is not getting better each day. Where can you learn more? Go to [employer]  Enter M973 in the search box to learn more about \"Wound Care: After Your Visit. \"   © 0725-2437 [employer], [employer]. Care instructions adapted under license by [employer] (which disclaims liability or warranty for this information). This care instruction is for use with your licensed healthcare professional. If you have questions about a medical condition or this instruction, always ask your healthcare professional. [place] [address] any warranty or liability for your use of this information. Content Version: 31.2.049915; Last Revised: April 23, 2012                 DISCHARGE SUMMARY from Nurse    PATIENT INSTRUCTIONS:    After general anesthesia or intravenous sedation, for 24 hours or while taking prescription Narcotics:  · Limit your activities  · Do not drive and operate hazardous machinery  · Do not make important personal or business decisions  · Do  not drink alcoholic beverages  · If you have not urinated within 8 hours after discharge, please contact your surgeon on call.     Report the following to your surgeon:  · Excessive pain, swelling, redness or odor of or around the surgical area  · Temperature over 100.5  · Nausea and vomiting lasting longer than 4 hours or if unable to take medications  · Any signs of decreased circulation or nerve impairment to extremity: change in color, persistent  numbness, tingling, coldness or increase pain  · Any questions    What to do at Home:  Recommended activity: {discharge activity:80694}, ***    If you experience any of the following symptoms ***, please follow up with ***. *  Please give a list of your current medications to your Primary Care Provider. *  Please update this list whenever your medications are discontinued, doses are      changed, or new medications (including over-the-counter products) are added. *  Please carry medication information at all times in case of emergency situations. These are general instructions for a healthy lifestyle:    No smoking/ No tobacco products/ Avoid exposure to second hand smoke  Surgeon General's Warning:  Quitting smoking now greatly reduces serious risk to your health. Obesity, smoking, and sedentary lifestyle greatly increases your risk for illness    A healthy diet, regular physical exercise & weight monitoring are important for maintaining a healthy lifestyle    You may be retaining fluid if you have a history of heart failure or if you experience any of the following symptoms:  Weight gain of 3 pounds or more overnight or 5 pounds in a week, increased swelling in our hands or feet or shortness of breath while lying flat in bed. Please call your doctor as soon as you notice any of these symptoms; do not wait until your next office visit. The discharge information has been reviewed with the {PATIENT PARENT GUARDIAN:30579}. The {PATIENT PARENT GUARDIAN:96944} verbalized understanding. Discharge medications reviewed with the {Dishcarjaguar meds reviewed UNDT:34861} and appropriate educational materials and side effects teaching were provided.   ___________________________________________________________________________________________________________________________________
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
## 2020-03-11 NOTE — PROGRESS NOTES
END OF SHIFT NOTE:    INTAKE/OUTPUT  03/10 0701 - 03/11 0700  In: 768.8 [I.V.:768.8]  Out: 2500 [Urine:2500]  Voiding: YES  Catheter: YES  Drain:              Flatus: Patient does have flatus present. Stool:  1 occurrences. Characteristics:       Emesis: 0 occurrences. Characteristics:        VITAL SIGNS  Patient Vitals for the past 12 hrs:   Temp Pulse Resp BP SpO2   03/11/20 0007 98.4 °F (36.9 °C) 91 18 146/77 97 %   03/10/20 1945 98.5 °F (36.9 °C) 96 18 150/81 96 %       Pain Assessment  Pain Intensity 1: 5 (03/10/20 1945)  Pain Location 1: Penis(from salazar insertion)  Pain Intervention(s) 1: Medication (see MAR)  Patient Stated Pain Goal: 2    Ambulating  Yes to bathroom. Patient had one stool. Salazar intact and continue to drain red bloody urine. Occ clots noted. Shift report given to oncoming nurse at the bedside.     Michael Davenport

## 2020-03-11 NOTE — PROGRESS NOTES
Pt with discharge orders this day. Pt to return home with spouse. Pt has declined need for [facility] services. No additional CM needs discharge. Milestones met. Care Management Interventions  PCP Verified by CM: Yes(Peter Hartley)  Mode of Transport at Discharge:  Other (see comment)(spouse/family)  Transition of Care Consult (CM Consult): Discharge Planning  Discharge Durable Medical Equipment: No  Physical Therapy Consult: Yes  Occupational Therapy Consult: Yes  Speech Therapy Consult: No  Current Support Network: Own Home, Lives with Spouse  Confirm Follow Up Transport: Family(TBD based on need)  The Plan for Transition of Care is Related to the Following Treatment Goals : home   The Patient and/or Patient Representative was Provided with a Choice of Provider and Agrees with the Discharge Plan?: (N/A)  Name of the Patient Representative Who was Provided with a Choice of Provider and Agrees with the Discharge Plan: (N/A)  Freedom of Choice List was Provided with Basic Dialogue that Supports the Patient's Individualized Plan of Care/Goals, Treatment Preferences and Shares the Quality Data Associated with the Providers?: (N/A)  [employer] Information Provided?: No  Discharge Location  Discharge Placement: Home

## 2020-03-12 ENCOUNTER — PATIENT OUTREACH (OUTPATIENT)
Dept: CASE MANAGEMENT | Age: 75
End: 2020-03-12

## 2020-03-12 NOTE — PROGRESS NOTES
This note will not be viewable in [address] Transition of Care Discharge Follow-up Questionnaire   Date/Time of Call:   3/12/2020   1136am   What was the patient hospitalized for? Open wound of lower extremity     Does the patient understand his/her diagnosis and/or treatment and what happened during the hospitalization? Yes, spoke with patient, he states understanding of diagnosis and treatment; and is agreeable to call. Patient states he is doing well   Did the patient receive discharge instructions? Yes    CM Assessed Risk for Readmission:       Patient stated Risk for Readmission:      Low  r/t diagnosis and/or comorbidities        None stated   Review any discharge instructions (see discharge instructions/AVS in [facility]). Ask patient if they understand these. Do they have any questions? Reviewed, understanding is stated, no questions at this time       Were home services ordered (nursing, PT, OT, ST, etc.)? No, patient declined   If so, has the first visit occurred? If not, why? (Assist with coordination of services if necessary.)   N/A   Was any DME ordered? No      If so, has it been received? If not, why?  (Assist patient in obtaining DME orders &/or equipment if necessary.) N/A   Complete a review of all medications (new, continued and discontinued meds per the D/C instructions and medication tab in [facility]). Completed  START taking:  tamsulosin [facility])   Were all new prescriptions filled? If not, why?  (Assist patient in obtaining medications if necessary  escalate for CCM &/or SW if ongoing issues are verbalized by pt or anticipated)   Yes    Does the patient understand the purpose and dosing instructions for all medications? (If patient has questions, provide explanation and education.)   Yes    Does the patient have any problems in performing ADLs? (If patient is unable to perform ADLs  what is the limiting factor(s)?   Do they have a support system that can assist? If no support system is present, discuss possible assistance that they may be able to obtain. Escalate for CCM/SW if ongoing issues are verbalized by pt or anticipated)   Independent with ADLs     Does the patient have all follow-up appointments scheduled? 7 day f/up with PCP?   (f/up with PCP may be w/in 14 days if patient has a f/up with their specialist w/in 7 days)    7-14 day f/up with specialist?   (or per discharge instructions)    If f/up has not been made  what actions has the care coordinator made to accomplish this? Has transportation been arranged? Yes      Dr. Alejandra Deleon 3/19/20; will refer to wound center                      Yes, no transportation needs are identified at this time   Any other questions or concerns expressed by the patient? No other needs or concerns identified. Patient states his gratitude for follow up. Contact information for Care Coordinator was given, instructed to call with new questions or concerns. Schedule next appointment with DEANNA Wong or refer to RN Case Manager/ per the workflow guidelines. When is care coordinators next follow-up call scheduled? If referred for CCM  what RN care manager was the referral assigned?    Care Coordinator will follow per workflow guidelines          Within 14 days     ERICA Call Completed By: Patrizia George LPN  Care Coordinator

## 2020-03-14 LAB
BACTERIA SPEC CULT: NORMAL
BACTERIA SPEC CULT: NORMAL
SERVICE CMNT-IMP: NORMAL
SERVICE CMNT-IMP: NORMAL

## 2020-03-26 ENCOUNTER — PATIENT OUTREACH (OUTPATIENT)
Dept: CASE MANAGEMENT | Age: 75
End: 2020-03-26

## 2020-03-26 NOTE — PROGRESS NOTES
Transitions of Care - Follow up Outreach Note   Outreach type Phone call: spoke with patient  Home visit:   Date/Time of Outreach: 3/26/2020  133pm     Has patient attended PCP or specialist follow-up appointments since last contact? What was outcome of appointment? When is next follow-up scheduled? Patient states he is doing well everything is just fine. He attended follow up as scheduled with next visit with Hugh Sutton NP; next visit 4/16/20   Review medications. Any medication changes since last outreach? Does patient have any questions or issues related to their medications? None stated      None stated. Home health active? If yes - any issue? Progress? No       Referrals needed?  (CM, SW, HH, etc. )   No     Other issues/Miscellaneous? (Transportation, access to meals, ability to perform ADLs, adequate caregiver support, etc.) No other needs or concerns at this time. Patient states his gratitude for follow up. Next Outreach Scheduled?     Graduation from program?   N/A    Yes      Next Steps/Goals (if applicable):   N/A     Outreach completed by:   Andreia Toro LPN  Care Coordinator

## 2021-04-21 PROBLEM — Z51.81 ENCOUNTER FOR THERAPEUTIC DRUG LEVEL MONITORING: Status: ACTIVE | Noted: 2021-04-21

## 2021-04-21 PROBLEM — R53.83 FATIGUE: Status: ACTIVE | Noted: 2021-04-21

## 2021-04-21 PROBLEM — Z79.899 OTHER LONG TERM (CURRENT) DRUG THERAPY: Status: ACTIVE | Noted: 2021-04-21

## 2021-04-21 PROBLEM — E79.0 HYPERURICEMIA: Status: ACTIVE | Noted: 2021-04-21

## 2021-04-21 PROBLEM — Z79.52 LONG TERM CURRENT USE OF SYSTEMIC STEROIDS: Status: ACTIVE | Noted: 2021-04-21

## 2021-04-21 PROBLEM — M85.88 OTHER SPECIFIED DISORDERS OF BONE DENSITY AND STRUCTURE, OTHER SITE: Status: ACTIVE | Noted: 2021-04-21

## 2021-11-09 PROBLEM — F11.99 OPIOID USE, UNSPECIFIED WITH UNSPECIFIED OPIOID-INDUCED DISORDER (HCC): Status: ACTIVE | Noted: 2021-11-09

## 2022-02-17 ENCOUNTER — HOSPITAL ENCOUNTER (OUTPATIENT)
Dept: LAB | Age: 77
Discharge: HOME | End: 2022-02-17
Payer: MEDICARE

## 2022-02-17 DIAGNOSIS — R26.89 LOSS OF BALANCE: ICD-10-CM

## 2022-02-17 LAB — VIT B12 SERPL-MCNC: 363 PG/ML (ref 193–986)

## 2022-02-17 PROCEDURE — 82607 VITAMIN B-12: CPT

## 2022-02-17 PROCEDURE — 36415 COLL VENOUS BLD VENIPUNCTURE: CPT

## 2022-03-18 PROBLEM — R53.83 FATIGUE: Status: ACTIVE | Noted: 2021-04-21

## 2022-03-18 PROBLEM — Z79.4 UNCONTROLLED TYPE 2 DIABETES MELLITUS WITH HYPERGLYCEMIA, WITH LONG-TERM CURRENT USE OF INSULIN (HCC): Status: ACTIVE | Noted: 2018-12-30

## 2022-03-18 PROBLEM — E11.65 UNCONTROLLED TYPE 2 DIABETES MELLITUS WITH HYPERGLYCEMIA, WITH LONG-TERM CURRENT USE OF INSULIN (HCC): Status: ACTIVE | Noted: 2018-12-30

## 2022-03-18 PROBLEM — Z51.81 ENCOUNTER FOR THERAPEUTIC DRUG LEVEL MONITORING: Status: ACTIVE | Noted: 2021-04-21

## 2022-03-18 PROBLEM — Z78.9 STATIN INTOLERANCE: Status: ACTIVE | Noted: 2017-01-06

## 2022-03-18 PROBLEM — J98.6 CHRONICALLY ELEVATED HEMIDIAPHRAGM: Status: ACTIVE | Noted: 2018-04-09

## 2022-03-18 PROBLEM — R27.8 DECREASED DEXTERITY: Status: ACTIVE | Noted: 2017-12-06

## 2022-03-19 PROBLEM — E79.0 HYPERURICEMIA: Status: ACTIVE | Noted: 2021-04-21

## 2022-03-19 PROBLEM — M85.88 OTHER SPECIFIED DISORDERS OF BONE DENSITY AND STRUCTURE, OTHER SITE: Status: ACTIVE | Noted: 2021-04-21

## 2022-03-19 PROBLEM — Z79.52 LONG TERM CURRENT USE OF SYSTEMIC STEROIDS: Status: ACTIVE | Noted: 2021-04-21

## 2022-03-19 PROBLEM — S81.809A OPEN WOUND OF LOWER EXTREMITY: Status: ACTIVE | Noted: 2020-03-11

## 2022-03-19 PROBLEM — F11.99 OPIOID USE, UNSPECIFIED WITH UNSPECIFIED OPIOID-INDUCED DISORDER (HCC): Status: ACTIVE | Noted: 2021-11-09

## 2022-03-19 PROBLEM — Z91.14 NON COMPLIANCE W MEDICATION REGIMEN: Status: ACTIVE | Noted: 2018-12-30

## 2022-03-19 PROBLEM — Z91.148 NON COMPLIANCE W MEDICATION REGIMEN: Status: ACTIVE | Noted: 2018-12-30

## 2022-03-19 PROBLEM — Z79.899 OTHER LONG TERM (CURRENT) DRUG THERAPY: Status: ACTIVE | Noted: 2021-04-21

## 2022-03-19 PROBLEM — E11.40 TYPE 2 DIABETES MELLITUS WITH DIABETIC NEUROPATHY (HCC): Status: ACTIVE | Noted: 2018-03-14

## 2022-03-19 PROBLEM — N28.89 RENAL MASS, LEFT: Status: ACTIVE | Noted: 2018-05-01

## 2022-05-18 ENCOUNTER — HOSPITAL ENCOUNTER (OUTPATIENT)
Dept: GENERAL RADIOLOGY | Age: 77
Discharge: HOME | End: 2022-05-18
Payer: MEDICARE

## 2022-05-18 DIAGNOSIS — M06.09 POLYARTHRITIS WITH NEGATIVE RHEUMATOID FACTOR (HCC): ICD-10-CM

## 2022-05-18 PROCEDURE — 73120 X-RAY EXAM OF HAND: CPT

## 2022-05-24 ENCOUNTER — OFFICE VISIT (OUTPATIENT)
Dept: INTERNAL MEDICINE CLINIC | Facility: CLINIC | Age: 77
End: 2022-05-24
Payer: MEDICARE

## 2022-05-24 VITALS
TEMPERATURE: 98 F | HEART RATE: 88 BPM | DIASTOLIC BLOOD PRESSURE: 78 MMHG | SYSTOLIC BLOOD PRESSURE: 140 MMHG | WEIGHT: 211.1 LBS | HEIGHT: 70 IN | BODY MASS INDEX: 30.22 KG/M2 | OXYGEN SATURATION: 95 %

## 2022-05-24 DIAGNOSIS — M06.09 RHEUMATOID ARTHRITIS OF MULTIPLE SITES WITH NEGATIVE RHEUMATOID FACTOR (HCC): ICD-10-CM

## 2022-05-24 DIAGNOSIS — Z91.81 AT HIGH RISK FOR FALLS: ICD-10-CM

## 2022-05-24 DIAGNOSIS — E79.0 HYPERURICEMIA: ICD-10-CM

## 2022-05-24 DIAGNOSIS — Z91.81 AT RISK FOR FALL DUE TO COMORBID CONDITION: ICD-10-CM

## 2022-05-24 DIAGNOSIS — Z79.4 TYPE 2 DIABETES MELLITUS WITH DIABETIC NEUROPATHY, WITH LONG-TERM CURRENT USE OF INSULIN (HCC): Primary | ICD-10-CM

## 2022-05-24 DIAGNOSIS — I10 ESSENTIAL HYPERTENSION: ICD-10-CM

## 2022-05-24 DIAGNOSIS — Z79.52 LONG TERM CURRENT USE OF SYSTEMIC STEROIDS: ICD-10-CM

## 2022-05-24 DIAGNOSIS — E55.9 VITAMIN D DEFICIENCY: ICD-10-CM

## 2022-05-24 DIAGNOSIS — E53.8 LOW VITAMIN B12 LEVEL: ICD-10-CM

## 2022-05-24 DIAGNOSIS — E11.40 TYPE 2 DIABETES MELLITUS WITH DIABETIC NEUROPATHY, WITH LONG-TERM CURRENT USE OF INSULIN (HCC): Primary | ICD-10-CM

## 2022-05-24 DIAGNOSIS — E78.2 MIXED HYPERLIPIDEMIA: ICD-10-CM

## 2022-05-24 PROCEDURE — 99214 OFFICE O/P EST MOD 30 MIN: CPT | Performed by: NURSE PRACTITIONER

## 2022-05-24 PROCEDURE — 3051F HG A1C>EQUAL 7.0%<8.0%: CPT | Performed by: NURSE PRACTITIONER

## 2022-05-24 PROCEDURE — 1123F ACP DISCUSS/DSCN MKR DOCD: CPT | Performed by: NURSE PRACTITIONER

## 2022-05-24 ASSESSMENT — PATIENT HEALTH QUESTIONNAIRE - PHQ9
SUM OF ALL RESPONSES TO PHQ9 QUESTIONS 1 & 2: 0
SUM OF ALL RESPONSES TO PHQ QUESTIONS 1-9: 0
2. FEELING DOWN, DEPRESSED OR HOPELESS: 0
1. LITTLE INTEREST OR PLEASURE IN DOING THINGS: 0
SUM OF ALL RESPONSES TO PHQ QUESTIONS 1-9: 0

## 2022-05-24 NOTE — PROGRESS NOTES
PROGRESS NOTE    SUBJECTIVE:   Betty Herring is a 78 y.o. male seen for a follow up visit for   Chief Complaint   Patient presents with    Follow-up Chronic Condition     HPI     Diabetes   The history is provided by the patient (reports blood sugars fasting are typically 100's to 200's. He only tests his blood sugar about 3 times per week). This is a chronic problem. Episode onset: 2002. The problem occurs constantly. The problem has been gradually worsening. Pertinent  negatives include no chest pain, no headaches and no shortness of breath. The symptoms are aggravated by eating. The symptoms are relieved by medications. Treatments tried: Now on Tresiba U-100, novolog 75/30 without improvement and metformin. The treatment provided moderate relief. Hypertension    The history is provided by the patient and medical records (reports BP at home 130s/80s). This is a chronic problem. Episode onset: 2000. The problem has not changed since onset. Associated  symptoms include neck pain (chronic). Pertinent negatives include no chest pain, no orthopnea, no palpitations, no malaise/fatigue, no blurred vision, no headaches, no peripheral edema, no dizziness, no shortness of breath, no nausea and no vomiting. Agents associated with hypertension include steroids. Risk factors include family history, diabetes mellitus, obesity, male gender, hypertension and smoking/tobacco exposure. Anemia   Pertinent negatives include no chest pain, no headaches and no shortness of breath. Arthritis   The history is provided by the patient (Rheumatoid). This is a chronic problem. Episode onset: > 10 years ago. The  problem occurs constantly. The problem has been gradually  worsening. Pertinent negatives include no chest pain,  no headaches and no shortness of breath. The symptoms are relieved by medications. Treatments tried: Prednisone, Humira. The  treatment provided mild relief.      Statin intolerance  Rheumatoid Arthritis multiple sites with RA  Loss of balance     Reviewed and updated this visit by provider:  Tobacco  Allergies  Meds  Problems  Med Hx  Surg Hx  Fam Hx         Review of Systems   Constitutional: Negative for fatigue. Eyes:        Prosthetic eye   Respiratory: Negative for cough, chest tightness, shortness of breath and wheezing. Cardiovascular: Negative for chest pain, palpitations and leg swelling. Gastrointestinal: Negative for nausea. Musculoskeletal: Positive for arthralgias (chronic multiple joint pain), back pain (chronic ), gait problem (secondary to joint pain bilaterally) and neck pain. Skin: Negative for color change. Neurological: Negative for light-headedness and headaches. Off balance   Hematological: Does not bruise/bleed easily. Psychiatric/Behavioral: The patient is not nervous/anxious. OBJECTIVE:    BP (!) 140/78 (Site: Left Upper Arm, Position: Sitting, Cuff Size: Large Adult)   Pulse 88   Temp 98 °F (36.7 °C) (Temporal)   Ht 5' 10\" (1.778 m)   Wt 211 lb 1.6 oz (95.8 kg)   SpO2 95%   BMI 30.29 kg/m²      Physical Exam  Vitals and nursing note reviewed. Constitutional:       Appearance: Normal appearance. HENT:      Head: Normocephalic. Mouth/Throat:      Lips: Pink. Mouth: Mucous membranes are moist.   Eyes:      General: Lids are normal.   Neck:      Vascular: No carotid bruit. Cardiovascular:      Rate and Rhythm: Normal rate and regular rhythm. Pulses:           Dorsalis pedis pulses are 2+ on the right side and 2+ on the left side. Posterior tibial pulses are 1+ on the right side and 2+ on the left side. Pulmonary:      Effort: Pulmonary effort is normal.      Breath sounds: Normal breath sounds. Musculoskeletal:      Cervical back: Neck supple. Right lower leg: No edema. Left lower leg: No edema. Right foot: Decreased range of motion. Deformity present. Left foot: Decreased range of motion.  Deformity Humira. Using tramadol one to two per day. Effective for joint pain. PDMP reviewed. On the basis of positive falls risk screening, assessment and plan is as follows: in-office gait and balance testing performed using The Timed Up and Go Test was negative for increased falls risk- no further intervention is currently indicated, home safety tips provided, patient declines any further evaluation/treatment for increased falls risk. Victor Hugo Wright NP  Dictated using voice recognition software.  Proofread, but unrecognized voice recognition errors may exist.

## 2022-06-02 ASSESSMENT — ENCOUNTER SYMPTOMS
WHEEZING: 0
COUGH: 0
NAUSEA: 0
SHORTNESS OF BREATH: 0
COLOR CHANGE: 0
CHEST TIGHTNESS: 0
BACK PAIN: 1

## 2022-06-03 DIAGNOSIS — M25.50 POLYARTHRALGIA: Primary | ICD-10-CM

## 2022-06-03 DIAGNOSIS — R27.8 DECREASED DEXTERITY: ICD-10-CM

## 2022-06-03 RX ORDER — TRAMADOL HYDROCHLORIDE 50 MG/1
50 TABLET ORAL EVERY 8 HOURS PRN
Qty: 60 TABLET | Refills: 0 | Status: SHIPPED | OUTPATIENT
Start: 2022-06-03 | End: 2022-06-29 | Stop reason: SDUPTHER

## 2022-06-03 RX ORDER — TRAMADOL HYDROCHLORIDE 50 MG/1
TABLET ORAL
COMMUNITY
Start: 2022-05-02 | End: 2022-06-03 | Stop reason: SDUPTHER

## 2022-06-03 NOTE — TELEPHONE ENCOUNTER
Reviewed. Rheumatoid arthritis treatment by rheumatologist including Humira weekly. Use of prn tramadol for pain to allow function ability. PDMP reviewed. Pain contract signed.

## 2022-06-03 NOTE — TELEPHONE ENCOUNTER
His wife called in to request it.     Needing:   - Tramadol--50 mg (not sure about the qty of the mg)/NO PILLS in bottle/30 days supply/[facility] [address]/[phone]

## 2022-06-29 DIAGNOSIS — R27.8 DECREASED DEXTERITY: ICD-10-CM

## 2022-06-29 DIAGNOSIS — M25.50 POLYARTHRALGIA: ICD-10-CM

## 2022-07-06 RX ORDER — TRAMADOL HYDROCHLORIDE 50 MG/1
50 TABLET ORAL EVERY 8 HOURS PRN
Qty: 60 TABLET | Refills: 0 | Status: SHIPPED | OUTPATIENT
Start: 2022-07-06 | End: 2022-08-05 | Stop reason: SDUPTHER

## 2022-07-25 RX ORDER — OLMESARTAN MEDOXOMIL AND HYDROCHLOROTHIAZIDE 40/25 40; 25 MG/1; MG/1
TABLET ORAL
Qty: 90 TABLET | OUTPATIENT
Start: 2022-07-25

## 2022-07-27 DIAGNOSIS — N40.1 BENIGN PROSTATIC HYPERPLASIA WITH LOWER URINARY TRACT SYMPTOMS: ICD-10-CM

## 2022-07-27 DIAGNOSIS — E11.40 TYPE 2 DIABETES MELLITUS WITH DIABETIC NEUROPATHY, WITH LONG-TERM CURRENT USE OF INSULIN (HCC): Primary | ICD-10-CM

## 2022-07-27 DIAGNOSIS — Z79.4 TYPE 2 DIABETES MELLITUS WITH DIABETIC NEUROPATHY, WITH LONG-TERM CURRENT USE OF INSULIN (HCC): Primary | ICD-10-CM

## 2022-07-27 RX ORDER — TAMSULOSIN HYDROCHLORIDE 0.4 MG/1
CAPSULE ORAL
Qty: 90 CAPSULE | Refills: 3 | OUTPATIENT
Start: 2022-07-27

## 2022-07-27 NOTE — TELEPHONE ENCOUNTER
His wife would like to know why his Apryl Dykes medication was denied by our practice. Can you please check on it and call her back at [phone]?

## 2022-07-27 NOTE — TELEPHONE ENCOUNTER
Pt wife called:    olmesartan-hydrochlorothiazide 40-25mg is the correct medication that the pharmacy denied.

## 2022-07-28 RX ORDER — INSULIN DEGLUDEC 200 U/ML
37 INJECTION, SOLUTION SUBCUTANEOUS EVERY EVENING
Qty: 15 PEN | Refills: 3 | Status: SHIPPED | OUTPATIENT
Start: 2022-07-28

## 2022-07-28 RX ORDER — TAMSULOSIN HYDROCHLORIDE 0.4 MG/1
0.4 CAPSULE ORAL DAILY
Qty: 90 CAPSULE | Refills: 3 | Status: SHIPPED | OUTPATIENT
Start: 2022-07-28

## 2022-08-02 RX ORDER — OLMESARTAN MEDOXOMIL AND HYDROCHLOROTHIAZIDE 40/25 40; 25 MG/1; MG/1
1 TABLET ORAL DAILY
Qty: 90 TABLET | Refills: 3 | Status: SHIPPED | OUTPATIENT
Start: 2022-08-02

## 2022-08-05 DIAGNOSIS — M25.50 POLYARTHRALGIA: ICD-10-CM

## 2022-08-05 DIAGNOSIS — R27.8 DECREASED DEXTERITY: ICD-10-CM

## 2022-08-05 RX ORDER — TRAMADOL HYDROCHLORIDE 50 MG/1
50 TABLET ORAL EVERY 8 HOURS PRN
Qty: 60 TABLET | Refills: 0 | Status: SHIPPED | OUTPATIENT
Start: 2022-08-05 | End: 2022-09-04

## 2022-08-26 ENCOUNTER — OFFICE VISIT (OUTPATIENT)
Dept: INTERNAL MEDICINE CLINIC | Facility: CLINIC | Age: 77
End: 2022-08-26
Payer: MEDICARE

## 2022-08-26 VITALS
OXYGEN SATURATION: 97 % | TEMPERATURE: 97.7 F | HEART RATE: 93 BPM | HEIGHT: 70 IN | WEIGHT: 211.4 LBS | DIASTOLIC BLOOD PRESSURE: 86 MMHG | SYSTOLIC BLOOD PRESSURE: 142 MMHG | BODY MASS INDEX: 30.26 KG/M2

## 2022-08-26 DIAGNOSIS — E79.0 HYPERURICEMIA: ICD-10-CM

## 2022-08-26 DIAGNOSIS — I10 ESSENTIAL HYPERTENSION: ICD-10-CM

## 2022-08-26 DIAGNOSIS — Z00.00 MEDICARE ANNUAL WELLNESS VISIT, SUBSEQUENT: Primary | ICD-10-CM

## 2022-08-26 DIAGNOSIS — E78.2 MIXED HYPERLIPIDEMIA: ICD-10-CM

## 2022-08-26 DIAGNOSIS — E55.9 VITAMIN D DEFICIENCY: ICD-10-CM

## 2022-08-26 DIAGNOSIS — E53.8 LOW VITAMIN B12 LEVEL: ICD-10-CM

## 2022-08-26 DIAGNOSIS — Z79.4 TYPE 2 DIABETES MELLITUS WITH DIABETIC NEUROPATHY, WITH LONG-TERM CURRENT USE OF INSULIN (HCC): ICD-10-CM

## 2022-08-26 DIAGNOSIS — E11.40 TYPE 2 DIABETES MELLITUS WITH DIABETIC NEUROPATHY, WITH LONG-TERM CURRENT USE OF INSULIN (HCC): ICD-10-CM

## 2022-08-26 LAB
ALBUMIN SERPL-MCNC: 3.8 G/DL (ref 3.2–4.6)
ALBUMIN/GLOB SERPL: 1.1 {RATIO} (ref 1.2–3.5)
ALP SERPL-CCNC: 70 U/L (ref 50–136)
ALT SERPL-CCNC: 24 U/L (ref 12–65)
ANION GAP SERPL CALC-SCNC: 5 MMOL/L (ref 7–16)
AST SERPL-CCNC: 19 U/L (ref 15–37)
BILIRUB SERPL-MCNC: 0.4 MG/DL (ref 0.2–1.1)
BUN SERPL-MCNC: 15 MG/DL (ref 8–23)
CALCIUM SERPL-MCNC: 9.5 MG/DL (ref 8.3–10.4)
CHLORIDE SERPL-SCNC: 104 MMOL/L (ref 98–107)
CHOLEST SERPL-MCNC: 214 MG/DL
CO2 SERPL-SCNC: 30 MMOL/L (ref 21–32)
CREAT SERPL-MCNC: 1.2 MG/DL (ref 0.8–1.5)
GLOBULIN SER CALC-MCNC: 3.5 G/DL (ref 2.3–3.5)
GLUCOSE SERPL-MCNC: 106 MG/DL (ref 65–100)
HDLC SERPL-MCNC: 46 MG/DL (ref 40–60)
HDLC SERPL: 4.7 {RATIO}
LDLC SERPL CALC-MCNC: 144 MG/DL
MAGNESIUM SERPL-MCNC: 1.7 MG/DL (ref 1.8–2.4)
POTASSIUM SERPL-SCNC: 4.5 MMOL/L (ref 3.5–5.1)
PROT SERPL-MCNC: 7.3 G/DL (ref 6.3–8.2)
SODIUM SERPL-SCNC: 139 MMOL/L (ref 138–145)
TRIGL SERPL-MCNC: 120 MG/DL (ref 35–150)
URATE SERPL-MCNC: 6.2 MG/DL (ref 2.6–6)
VLDLC SERPL CALC-MCNC: 24 MG/DL (ref 6–23)

## 2022-08-26 PROCEDURE — G0439 PPPS, SUBSEQ VISIT: HCPCS | Performed by: NURSE PRACTITIONER

## 2022-08-26 PROCEDURE — 1123F ACP DISCUSS/DSCN MKR DOCD: CPT | Performed by: NURSE PRACTITIONER

## 2022-08-26 RX ORDER — VERAPAMIL HYDROCHLORIDE 180 MG/1
180 CAPSULE, EXTENDED RELEASE ORAL DAILY
Qty: 90 CAPSULE | Refills: 3 | Status: SHIPPED | OUTPATIENT
Start: 2022-08-26

## 2022-08-26 ASSESSMENT — LIFESTYLE VARIABLES
HOW OFTEN DO YOU HAVE A DRINK CONTAINING ALCOHOL: NEVER
HOW MANY STANDARD DRINKS CONTAINING ALCOHOL DO YOU HAVE ON A TYPICAL DAY: PATIENT DOES NOT DRINK

## 2022-08-26 NOTE — PATIENT INSTRUCTIONS
Eating Healthy Foods: Care Instructions  Your Care Instructions     Eating healthy foods can help lower your risk for disease. Healthy food gives you energy and keeps your heart strong, your brain active, your musclesworking, and your bones strong. A healthy diet includes a variety of foods from the basic food groups: grains, vegetables, fruits, milk and milk products, and meat and beans. Some people may eat more of their favorite foods from only one food group and, as a result, miss getting the nutrients they need. So, it is important to pay attention not only to what you eat but also to what you are missing from your diet. You caneat a healthy, balanced diet by making a few small changes. Follow-up care is a key part of your treatment and safety. Be sure to make and go to all appointments, and call your doctor if you are having problems. It's also a good idea to know your test results and keep alist of the medicines you take. How can you care for yourself at home? Look at what you eat  Keep a food diary for a week or two and record everything you eat or drink. Track the number of servings you eat from each food group. For a balanced diet every day, eat a variety of:  6 or more ounce-equivalents of grains, such as cereals, breads, crackers, rice, or pasta, every day. An ounce-equivalent is 1 slice of bread, 1 cup of ready-to-eat cereal, or ½ cup of cooked rice, cooked pasta, or cooked cereal.  2½ cups of vegetables, especially:  Dark-green vegetables such as broccoli and spinach. Orange vegetables such as carrots and sweet potatoes. Dry beans (such as dial and kidney beans) and peas (such as lentils). 2 cups of fresh, frozen, or canned fruit. A small apple or 1 banana or orange equals 1 cup.  3 cups of nonfat or low-fat milk, yogurt, or other milk products. 5½ ounces of meat and beans, such as chicken, fish, lean meat, beans, nuts, and seeds.  One egg, 1 tablespoon of peanut butter, ½ ounce nuts or seeds, or ¼ cup of cooked beans equals 1 ounce of meat. Learn how to read food labels for serving sizes and ingredients. Fast-food and convenience-food meals often contain few or no fruits or vegetables. Make sure you eat some fruits and vegetables to make the meal more nutritious. Look at your food diary. For each food group, add up what you have eaten and then divide the total by the number of days. This will give you an idea of how much you are eating from each food group. See if you can find some ways to change your diet to make it more healthy. Start small  Do not try to make dramatic changes to your diet all at once. You might feel that you are missing out on your favorite foods and then be more likely to fail. Start slowly, and gradually change your habits. Try some of the following:  Use whole wheat bread instead of white bread. Use nonfat or low-fat milk instead of whole milk. Eat brown rice instead of white rice, and eat whole wheat pasta instead of white-flour pasta. Try low-fat cheeses and low-fat yogurt. Add more fruits and vegetables to meals and have them for snacks. Add lettuce, tomato, cucumber, and onion to sandwiches. Add fruit to yogurt and cereal.  Enjoy food  You can still eat your favorite foods. You just may need to eat less of them. If your favorite foods are high in fat, salt, and sugar, limit how often you eat them, but do not cut them out entirely. Eat a wide variety of foods. Make healthy choices when eating out  The type of restaurant you choose can help you make healthy choices. Even fast-food chains are now offering more low-fat or healthier choices on the menu. Choose smaller portions, or take half of your meal home. When eating out, try:  A veggie pizza with a whole wheat crust or grilled chicken (instead of sausage or pepperoni). Pasta with roasted vegetables, grilled chicken, or marinara sauce instead of cream sauce.   A vegetable wrap or grilled chicken review. Other Preventive Recommendations:    A preventive eye exam performed by an eye specialist is recommended every 1-2 years to screen for glaucoma; cataracts, macular degeneration, and other eye disorders. A preventive dental visit is recommended every 6 months. Try to get at least 150 minutes of exercise per week or 10,000 steps per day on a pedometer . Order or download the FREE \"Exercise & Physical Activity: Your Everyday Guide\" from [employer]. Call 9-618.836.3997 or search [employer] online. You need 0016-7807 mg of calcium and 6845-5794 IU of vitamin D per day. It is possible to meet your calcium requirement with diet alone, but a vitamin D supplement is usually necessary to meet this goal.  When exposed to the sun, use a sunscreen that protects against both UVA and UVB radiation with an SPF of 30 or greater. Reapply every 2 to 3 hours or after sweating, drying off with a towel, or swimming. Always wear a seat belt when traveling in a car. Always wear a helmet when riding a bicycle or motorcycle.

## 2022-08-26 NOTE — PROGRESS NOTES
Interventions:  No Living Will: Advance Care Planning addressed with patient today    Health Habits/Nutrition:  Physical Activity: Inactive    Days of Exercise per Week: 0 days    Minutes of Exercise per Session: 0 min     Have you lost any weight without trying in the past 3 months?: No  Body mass index: (!) 30.33  Have you seen the dentist within the past year?: (!) No  Health Habits/Nutrition Interventions:  Dental exam overdue:  patient declines dental evaluation     Safety:  Do you have working smoke detectors?: Yes  Do you have any tripping hazards - loose or unsecured carpets or rugs?: No  Do you have any tripping hazards - clutter in doorways, halls, or stairs?: (!) Yes  Do you have either shower bars, grab bars, non-slip mats or non-slip surfaces in your shower or bathtub?: Yes (non slip)  Do all of your stairways have a railing or banister?: Not Applicable  Do you always fasten your seatbelt when you are in a car?: Yes  Safety Interventions:  Home safety tips provided           Objective   Vitals:    08/26/22 0854   BP: (!) 142/86   Site: Left Upper Arm   Position: Sitting   Cuff Size: Small Adult   Pulse: 93   Temp: 97.7 °F (36.5 °C)   TempSrc: Temporal   SpO2: 97%   Weight: 211 lb 6.4 oz (95.9 kg)   Height: 5' 10\" (1.778 m)      Body mass index is 30.33 kg/m². Allergies   Allergen Reactions    Atorvastatin Other (See Comments)    Fluvastatin Other (See Comments)    Hydroxychloroquine Other (See Comments)     Prior to Visit Medications    Medication Sig Taking? Authorizing Provider   Adalimumab 40 MG/0.4ML PNKT Inject into the skin Yes Historical Provider, MD   Multiple Vitamin (MULTIVITAMIN ADULT PO) Take 1 tablet by mouth daily Yes Historical Provider, MD   traMADol (ULTRAM) 50 MG tablet Take 1 tablet by mouth every 8 hours as needed for Pain for up to 30 days.  Yes EMMA Reaves NP   olmesartan-hydroCHLOROthiazide (BENICAR HCT) 40-25 MG per tablet Take 1 tablet by mouth in the morning. Yes EMMA Mccollum NP   Insulin Degludec (TRESIBA FLEXTOUCH) 200 UNIT/ML SOPN Inject 37 Units into the skin every evening Yes EMMA Mccollum NP   tamsulosin (FLOMAX) 0.4 MG capsule Take 1 capsule by mouth in the morning.  Yes EMMA Mccollum NP   camphor-menthol-methyl salicylate (BENGAY ULTRA STRENGTH) 4-10-30 % CREA cream daily as needed Yes Ar Automatic Reconciliation   Cholecalciferol 50 MCG (2000 UT) CAPS Take 2,000 Units by mouth 2 times daily Yes Ar Automatic Reconciliation   ibuprofen (ADVIL;MOTRIN) 200 MG tablet Take by mouth Yes Ar Automatic Reconciliation   leflunomide (ARAVA) 20 MG tablet TAKE 1 TABLET EVERY DAY Yes Ar Automatic Reconciliation   metFORMIN (GLUCOPHAGE) 1000 MG tablet TAKE ONE TABLET BY MOUTH TWICE A DAY WITH MEALS Yes Ar Automatic Reconciliation   predniSONE (DELTASONE) 5 MG tablet Take 1 tablet by mouth daily Yes Ar Automatic Reconciliation   verapamil (VERELAN) 180 MG extended release capsule Take 180 mg by mouth daily Yes Ar Automatic Reconciliation       CareTeam (Including outside providers/suppliers regularly involved in providing care):   Patient Care Team:  [facility] Empaneled Provider     Reviewed and updated this visit:  Tobacco  Allergies  Meds  Problems  Med Hx  Surg Hx  Soc Hx  Fam Hx

## 2022-08-27 LAB
EST. AVERAGE GLUCOSE BLD GHB EST-MCNC: 154 MG/DL
HBA1C MFR BLD: 7 % (ref 4.8–5.6)

## 2022-08-29 LAB — METHYLMALONATE SERPL-SCNC: 292 NMOL/L (ref 0–378)

## 2022-08-30 ENCOUNTER — TELEPHONE (OUTPATIENT)
Dept: INTERNAL MEDICINE CLINIC | Facility: CLINIC | Age: 77
End: 2022-08-30

## 2022-08-30 NOTE — TELEPHONE ENCOUNTER
----- Message from EMMA Reaves NP sent at 8/29/2022  7:18 PM EDT -----  The methylmalonic acid is normal.  No evidence of B12 deficiency.

## 2022-08-30 NOTE — TELEPHONE ENCOUNTER
----- Message from EMMA Lopez NP sent at 8/29/2022  7:18 PM EDT -----  The methylmalonic acid is normal.  No evidence of B12 deficiency.

## 2022-08-30 NOTE — TELEPHONE ENCOUNTER
----- Message from EMMA Alejo NP sent at 8/29/2022  7:18 PM EDT -----  The methylmalonic acid is normal.  No evidence of B12 deficiency.

## 2022-08-31 LAB — PCA AB SER-ACNC: 3.7 UNITS (ref 0–20)

## 2022-09-01 ENCOUNTER — OFFICE VISIT (OUTPATIENT)
Dept: INTERNAL MEDICINE CLINIC | Facility: CLINIC | Age: 77
End: 2022-09-01
Payer: MEDICARE

## 2022-09-01 VITALS
OXYGEN SATURATION: 91 % | DIASTOLIC BLOOD PRESSURE: 82 MMHG | BODY MASS INDEX: 30.26 KG/M2 | WEIGHT: 211.4 LBS | TEMPERATURE: 97.5 F | HEIGHT: 70 IN | SYSTOLIC BLOOD PRESSURE: 142 MMHG | HEART RATE: 102 BPM

## 2022-09-01 DIAGNOSIS — E78.2 MIXED HYPERLIPIDEMIA: ICD-10-CM

## 2022-09-01 DIAGNOSIS — Z79.4 TYPE 2 DIABETES MELLITUS WITH DIABETIC NEUROPATHY, WITH LONG-TERM CURRENT USE OF INSULIN (HCC): ICD-10-CM

## 2022-09-01 DIAGNOSIS — E11.40 TYPE 2 DIABETES MELLITUS WITH DIABETIC NEUROPATHY, WITH LONG-TERM CURRENT USE OF INSULIN (HCC): ICD-10-CM

## 2022-09-01 DIAGNOSIS — E55.9 VITAMIN D DEFICIENCY: ICD-10-CM

## 2022-09-01 DIAGNOSIS — Z79.52 LONG TERM CURRENT USE OF SYSTEMIC STEROIDS: ICD-10-CM

## 2022-09-01 DIAGNOSIS — D50.8 IRON DEFICIENCY ANEMIA SECONDARY TO INADEQUATE DIETARY IRON INTAKE: ICD-10-CM

## 2022-09-01 DIAGNOSIS — Z78.9 STATIN INTOLERANCE: ICD-10-CM

## 2022-09-01 DIAGNOSIS — D64.9 ANEMIA, UNSPECIFIED TYPE: ICD-10-CM

## 2022-09-01 DIAGNOSIS — I10 ESSENTIAL HYPERTENSION: Primary | ICD-10-CM

## 2022-09-01 PROCEDURE — 3051F HG A1C>EQUAL 7.0%<8.0%: CPT | Performed by: NURSE PRACTITIONER

## 2022-09-01 PROCEDURE — 99214 OFFICE O/P EST MOD 30 MIN: CPT | Performed by: NURSE PRACTITIONER

## 2022-09-01 PROCEDURE — 1123F ACP DISCUSS/DSCN MKR DOCD: CPT | Performed by: NURSE PRACTITIONER

## 2022-09-01 ASSESSMENT — PATIENT HEALTH QUESTIONNAIRE - PHQ9
SUM OF ALL RESPONSES TO PHQ QUESTIONS 1-9: 0
SUM OF ALL RESPONSES TO PHQ9 QUESTIONS 1 & 2: 0
SUM OF ALL RESPONSES TO PHQ QUESTIONS 1-9: 0
2. FEELING DOWN, DEPRESSED OR HOPELESS: 0
1. LITTLE INTEREST OR PLEASURE IN DOING THINGS: 0

## 2022-09-01 NOTE — PROGRESS NOTES
PROGRESS NOTE    SUBJECTIVE:   Kelli Aguilar is a 78 y.o. male seen for a follow up visit for   Chief Complaint   Patient presents with    Follow-up Chronic Condition      Diabetes   The history is provided by the patient (reports blood sugars fasting are typically 100's to 200's. He only tests his blood sugar about 3 times per week). This is a chronic problem. Episode onset: 2002. The problem occurs constantly. The problem has been gradually worsening. Pertinent  negatives include no chest pain, no headaches and no shortness of breath. The symptoms are aggravated by eating. The symptoms are relieved by medications. Treatments tried: Now on Tresiba U-100, novolog 75/30 without improvement and metformin. The treatment provided moderate relief. Hypertension    The history is provided by the patient and medical records (reports BP at home 130s/80s). This is a chronic problem. Episode onset: 2000. The problem has not changed since onset. Associated  symptoms include neck pain (chronic). Pertinent negatives include no chest pain, no orthopnea, no palpitations, no malaise/fatigue, no blurred vision, no headaches, no peripheral edema, no dizziness, no shortness of breath, no nausea and no vomiting. Agents associated with hypertension include steroids. Risk factors include family history, diabetes mellitus, obesity, male gender, hypertension and smoking/tobacco exposure. Anemia   Pertinent negatives include no chest pain, no headaches and no shortness of breath. Arthritis   The history is provided by the patient (Rheumatoid). This is a chronic problem. Episode onset: > 10 years ago. The  problem occurs constantly. The problem has been gradually  worsening. Pertinent negatives include no chest pain,  no headaches and no shortness of breath. The symptoms are relieved by medications. Treatments tried: Prednisone, Humira. The  treatment provided mild relief.           Reviewed and updated this visit by provider:  Tobacco  Allergies  Meds  Problems  Med Hx  Surg Hx  Fam Hx         Review of Systems   Constitutional:  Negative for chills, fatigue and fever. Respiratory:  Negative for chest tightness, shortness of breath and wheezing. Cardiovascular:  Negative for chest pain, palpitations and leg swelling. Gastrointestinal:  Negative for abdominal pain. Endocrine: Negative for polydipsia, polyphagia and polyuria. Genitourinary:  Negative for frequency. Musculoskeletal:  Negative for gait problem. Skin:  Negative for rash. Neurological:  Negative for weakness, numbness and headaches. Psychiatric/Behavioral:  Negative for dysphoric mood. The patient is not nervous/anxious. OBJECTIVE:    BP (!) 142/82 (Site: Left Upper Arm, Position: Sitting, Cuff Size: Small Adult)   Pulse (!) 102   Temp 97.5 °F (36.4 °C) (Temporal)   Ht 5' 10\" (1.778 m)   Wt 211 lb 6.4 oz (95.9 kg)   SpO2 91%   BMI 30.33 kg/m²      Physical Exam  Vitals and nursing note reviewed. Constitutional:       Appearance: Normal appearance. HENT:      Head: Normocephalic. Mouth/Throat:      Lips: Pink. Mouth: Mucous membranes are moist.   Eyes:      General: Lids are normal.   Neck:      Vascular: No carotid bruit. Cardiovascular:      Rate and Rhythm: Normal rate and regular rhythm. Pulmonary:      Effort: Pulmonary effort is normal.      Breath sounds: Normal breath sounds. Musculoskeletal:      Cervical back: Neck supple. Right lower leg: No edema. Left lower leg: No edema. Skin:     General: Skin is warm and dry. Neurological:      Mental Status: He is alert and oriented to person, place, and time. Mental status is at baseline. Gait: Gait normal.   Psychiatric:         Mood and Affect: Mood normal.         Behavior: Behavior normal.        ASSESSMENT and PLAN    1. Essential hypertension  -     CBC with Auto Differential; Future  -     Comprehensive Metabolic Panel;  Future  - Magnesium; Future  -     Uric Acid; Future  -     TSH; Future  2. Type 2 diabetes mellitus with diabetic neuropathy, with long-term current use of insulin (HCC)  -     Comprehensive Metabolic Panel; Future  -     Hemoglobin A1C; Future  3. Vitamin D deficiency  -     Comprehensive Metabolic Panel; Future  4. Statin intolerance  5. Mixed hyperlipidemia  -     Comprehensive Metabolic Panel; Future  6. Long term current use of systemic steroids  -     CBC with Auto Differential; Future  -     Comprehensive Metabolic Panel; Future  7. Iron deficiency anemia secondary to inadequate dietary iron intake  -     CBC with Auto Differential; Future  -     Comprehensive Metabolic Panel; Future  -     Transferrin Saturation; Future  -     Ferritin; Future  8. Anemia, unspecified type  -     Transferrin Saturation; Future  -     Ferritin; Future    Return for Follow-Up, DM2, HTN, with labs. current treatment plan is effective, no change in therapy  lab results and schedule of future lab studies reviewed with patient  reviewed diet, exercise and weight control  cardiovascular risk and specific lipid/LDL goals reviewed  reviewed medications and side effects in detail  specific diabetic recommendations: low cholesterol diet, weight control and daily exercise discussed, all medications, side effects and compliance discussed carefully, foot care discussed and Podiatry visits discussed, annual eye examinations at Ophthalmology discussed, glycohemoglobin and other lab monitoring discussed, long term diabetic complications discussed, and labs immediately prior to next visit      EMMA Dobson NP    Dictated using voice recognition software.  Proofread, but unrecognized voice recognition errors may exist.

## 2022-09-07 DIAGNOSIS — R27.8 DECREASED DEXTERITY: ICD-10-CM

## 2022-09-07 DIAGNOSIS — M25.50 POLYARTHRALGIA: ICD-10-CM

## 2022-09-07 RX ORDER — TRAMADOL HYDROCHLORIDE 50 MG/1
50 TABLET ORAL EVERY 8 HOURS PRN
Qty: 60 TABLET | Refills: 0 | Status: SHIPPED | OUTPATIENT
Start: 2022-09-07 | End: 2022-10-07 | Stop reason: SDUPTHER

## 2022-10-02 ASSESSMENT — ENCOUNTER SYMPTOMS
ABDOMINAL PAIN: 0
CHEST TIGHTNESS: 0
SHORTNESS OF BREATH: 0
WHEEZING: 0

## 2022-10-06 ENCOUNTER — TELEPHONE (OUTPATIENT)
Dept: INTERNAL MEDICINE CLINIC | Facility: CLINIC | Age: 77
End: 2022-10-06

## 2022-10-07 DIAGNOSIS — M25.50 POLYARTHRALGIA: ICD-10-CM

## 2022-10-07 DIAGNOSIS — R27.8 DECREASED DEXTERITY: ICD-10-CM

## 2022-10-07 RX ORDER — TRAMADOL HYDROCHLORIDE 50 MG/1
50 TABLET ORAL EVERY 8 HOURS PRN
Qty: 60 TABLET | Refills: 0 | Status: SHIPPED | OUTPATIENT
Start: 2022-10-07 | End: 2022-11-06

## 2022-11-07 DIAGNOSIS — N40.1 BENIGN PROSTATIC HYPERPLASIA WITH LOWER URINARY TRACT SYMPTOMS: ICD-10-CM

## 2022-11-07 DIAGNOSIS — I10 ESSENTIAL HYPERTENSION: Primary | ICD-10-CM

## 2022-11-07 RX ORDER — OLMESARTAN MEDOXOMIL AND HYDROCHLOROTHIAZIDE 40/25 40; 25 MG/1; MG/1
1 TABLET ORAL DAILY
Qty: 90 TABLET | Refills: 3 | Status: SHIPPED | OUTPATIENT
Start: 2022-11-07

## 2022-11-07 RX ORDER — TAMSULOSIN HYDROCHLORIDE 0.4 MG/1
0.4 CAPSULE ORAL DAILY
Qty: 90 CAPSULE | Refills: 3 | Status: SHIPPED | OUTPATIENT
Start: 2022-11-07

## 2022-11-07 NOTE — TELEPHONE ENCOUNTER
Patient calling for a refill of Tramadol and Olmesartan to be sent to [facility] on [address] [facility] [facility]

## 2022-11-08 ENCOUNTER — TELEPHONE (OUTPATIENT)
Dept: INTERNAL MEDICINE CLINIC | Facility: CLINIC | Age: 77
End: 2022-11-08

## 2022-11-08 DIAGNOSIS — M25.50 POLYARTHRALGIA: ICD-10-CM

## 2022-11-08 DIAGNOSIS — R27.8 DECREASED DEXTERITY: ICD-10-CM

## 2022-11-08 RX ORDER — TRAMADOL HYDROCHLORIDE 50 MG/1
50 TABLET ORAL EVERY 8 HOURS PRN
Qty: 60 TABLET | Refills: 0 | Status: CANCELLED | OUTPATIENT
Start: 2022-11-08 | End: 2022-12-08

## 2022-11-08 NOTE — TELEPHONE ENCOUNTER
Patient needs tramadol to be [facility] to [facility] on [address] [facility]. Patient's wife called this in yesterday and is unsure why it was not filled.

## 2022-11-09 RX ORDER — TRAMADOL HYDROCHLORIDE 50 MG/1
50 TABLET ORAL EVERY 8 HOURS PRN
Qty: 60 TABLET | Refills: 0 | Status: SHIPPED | OUTPATIENT
Start: 2022-11-09 | End: 2022-12-09

## 2022-12-06 DIAGNOSIS — M06.09 RHEUMATOID ARTHRITIS OF MULTIPLE SITES WITH NEGATIVE RHEUMATOID FACTOR (HCC): Primary | ICD-10-CM

## 2022-12-06 DIAGNOSIS — R27.8 DECREASED DEXTERITY: ICD-10-CM

## 2022-12-06 DIAGNOSIS — M25.50 POLYARTHRALGIA: ICD-10-CM

## 2022-12-06 PROBLEM — S81.809A OPEN WOUND OF LOWER EXTREMITY: Status: RESOLVED | Noted: 2020-03-11 | Resolved: 2022-12-06

## 2022-12-06 RX ORDER — TRAMADOL HYDROCHLORIDE 50 MG/1
50 TABLET ORAL EVERY 8 HOURS PRN
Qty: 60 TABLET | Refills: 0 | Status: SHIPPED | OUTPATIENT
Start: 2022-12-06 | End: 2023-01-05

## 2022-12-06 NOTE — TELEPHONE ENCOUNTER
Patient needs a refill of Tramadol to be sent to [facility] on [address] [facility].  Last refill 11/9 and next appt 1/5/23

## 2023-01-05 ENCOUNTER — OFFICE VISIT (OUTPATIENT)
Dept: INTERNAL MEDICINE CLINIC | Facility: CLINIC | Age: 78
End: 2023-01-05
Payer: MEDICARE

## 2023-01-05 VITALS
DIASTOLIC BLOOD PRESSURE: 82 MMHG | HEART RATE: 76 BPM | BODY MASS INDEX: 29.82 KG/M2 | SYSTOLIC BLOOD PRESSURE: 154 MMHG | OXYGEN SATURATION: 95 % | WEIGHT: 207.8 LBS

## 2023-01-05 DIAGNOSIS — M06.09 RHEUMATOID ARTHRITIS OF MULTIPLE SITES WITH NEGATIVE RHEUMATOID FACTOR (HCC): ICD-10-CM

## 2023-01-05 DIAGNOSIS — M25.50 POLYARTHRALGIA: ICD-10-CM

## 2023-01-05 DIAGNOSIS — Z79.4 TYPE 2 DIABETES MELLITUS WITH DIABETIC NEUROPATHY, WITH LONG-TERM CURRENT USE OF INSULIN (HCC): Primary | ICD-10-CM

## 2023-01-05 DIAGNOSIS — I10 ESSENTIAL HYPERTENSION: ICD-10-CM

## 2023-01-05 DIAGNOSIS — Z12.5 ENCOUNTER FOR SCREENING FOR MALIGNANT NEOPLASM OF PROSTATE: ICD-10-CM

## 2023-01-05 DIAGNOSIS — D50.8 IRON DEFICIENCY ANEMIA SECONDARY TO INADEQUATE DIETARY IRON INTAKE: ICD-10-CM

## 2023-01-05 DIAGNOSIS — E55.9 VITAMIN D DEFICIENCY: ICD-10-CM

## 2023-01-05 DIAGNOSIS — E53.8 LOW VITAMIN B12 LEVEL: ICD-10-CM

## 2023-01-05 DIAGNOSIS — E11.40 TYPE 2 DIABETES MELLITUS WITH DIABETIC NEUROPATHY, WITH LONG-TERM CURRENT USE OF INSULIN (HCC): Primary | ICD-10-CM

## 2023-01-05 DIAGNOSIS — R27.8 DECREASED DEXTERITY: ICD-10-CM

## 2023-01-05 PROCEDURE — 3074F SYST BP LT 130 MM HG: CPT | Performed by: NURSE PRACTITIONER

## 2023-01-05 PROCEDURE — 1123F ACP DISCUSS/DSCN MKR DOCD: CPT | Performed by: NURSE PRACTITIONER

## 2023-01-05 PROCEDURE — 99214 OFFICE O/P EST MOD 30 MIN: CPT | Performed by: NURSE PRACTITIONER

## 2023-01-05 PROCEDURE — 3078F DIAST BP <80 MM HG: CPT | Performed by: NURSE PRACTITIONER

## 2023-01-05 RX ORDER — TRAMADOL HYDROCHLORIDE 50 MG/1
50 TABLET ORAL EVERY 8 HOURS PRN
Qty: 60 TABLET | Refills: 2 | Status: SHIPPED | OUTPATIENT
Start: 2023-01-05 | End: 2023-02-04

## 2023-01-05 NOTE — PATIENT INSTRUCTIONS
Today  go straight to drug store and  blood pressure medicine olmesartan-HCTZ and start today. Please ask rheumatologist to send copy of lab work to our office so labs are not duplicated.

## 2023-01-05 NOTE — PROGRESS NOTES
PROGRESS NOTE    SUBJECTIVE:   Adore Cardoza is a 78 y.o. male seen for a follow up visit for   Chief Complaint   Patient presents with    Diabetes    Cholesterol Problem    Hypertension     Out of olmesartan-HCTZ about 3 weeks       Diabetes  He reports blood sugars fasting are typically 100's to 200's. He only tests his blood sugar about 3 times per week. This is a chronic problem. Episode onset: 2002. Treatments tried: Now on Tresiba U-100 and metformin. The treatment provided moderate relief. Hypertension   This is a chronic problem. Episode onset: 2000. Agents associated with hypertension include steroids. Risk factors include family history, diabetes mellitus, obesity, male gender, hypertension and smoking/tobacco exposure. Anemia  Pertinent negatives include no chest pain, no headaches and no shortness of breath. Rheumatoid arthritis  This is a chronic problem. Episode onset: > 10 years ago. The symptoms are relieved by medications. Treatments tried: Prednisone, Humira. The  treatment provided mild relief. Reviewed and updated this visit by provider:  Tobacco  Allergies  Meds  Problems  Med Hx  Surg Hx  Fam Hx         Review of Systems   Constitutional:  Negative for chills, fatigue and fever. Respiratory:  Negative for chest tightness, shortness of breath and wheezing. Cardiovascular:  Negative for chest pain, palpitations and leg swelling. Gastrointestinal:  Negative for abdominal pain. Endocrine: Negative for polydipsia, polyphagia and polyuria. Genitourinary:  Negative for frequency. Musculoskeletal:  Positive for arthralgias (polyarthralgia) and neck stiffness. Negative for gait problem. Skin:  Negative for rash. Neurological:  Negative for weakness, numbness and headaches. Psychiatric/Behavioral:  Negative for dysphoric mood and sleep disturbance. The patient is not nervous/anxious and is not hyperactive.        OBJECTIVE:    BP (!) 154/82 (Site: Left Upper Arm, Position: Sitting, Cuff Size: Small Adult)   Pulse 76   Wt 207 lb 12.8 oz (94.3 kg)   SpO2 95%   BMI 29.82 kg/m²      Physical Exam  Vitals and nursing note reviewed. Constitutional:       Appearance: Normal appearance. HENT:      Head: Normocephalic. Mouth/Throat:      Lips: Pink. Mouth: Mucous membranes are moist.   Eyes:      General: Lids are normal.   Neck:      Thyroid: No thyroid mass or thyromegaly. Vascular: No carotid bruit. Cardiovascular:      Rate and Rhythm: Normal rate and regular rhythm. Pulmonary:      Effort: Pulmonary effort is normal.      Breath sounds: Normal breath sounds. Musculoskeletal:      Cervical back: Neck supple. Decreased range of motion (rotation only about 20% bilateral). Right lower leg: No edema. Left lower leg: No edema. Lymphadenopathy:      Cervical:      Right cervical: No superficial cervical adenopathy. Left cervical: No superficial cervical adenopathy. Skin:     General: Skin is warm and dry. Neurological:      Mental Status: He is alert and oriented to person, place, and time. Mental status is at baseline. Motor: Weakness (decreased hand/finger dexterity) present. Gait: Gait abnormal (wide stane). Psychiatric:         Mood and Affect: Mood normal.         Behavior: Behavior normal.        ASSESSMENT and PLAN    1. Type 2 diabetes mellitus with diabetic neuropathy, with long-term current use of insulin ([facility])  -     Comprehensive Metabolic Panel; Future  -     Hemoglobin A1C; Future  He did not get labs due to not receiving lab reminder call. Reports morning blood sugars are typically less than 160. Labs before next appointment. 2. Essential hypertension  -     Comprehensive Metabolic Panel; Future  -     Magnesium; Future  -     Uric Acid; Future  Blood pressure borderline elevated. States he did not take his medication this morning.   He is advised to take medicines prior to coming to the NSAID to be able to measure the efficacy of the medications. He agrees. 3. Vitamin D deficiency  -     Vitamin D 25 Hydroxy; Future  Will need vitamin D with next lab draw. 4. Decreased dexterity  -     traMADol (ULTRAM) 50 MG tablet; Take 1 tablet by mouth every 8 hours as needed for Pain for up to 30 days. , Disp-60 tablet, R-2Normal  He has significant polyarthralgia including pain in his hands. He has difficulty with using a belt and with buttoning of clothes. He has been using tramadol typically twice a day in order to perform his ADLs. 5. Iron deficiency anemia secondary to inadequate dietary iron intake  Recheck trans sat. 6. Polyarthralgia  -     traMADol (ULTRAM) 50 MG tablet; Take 1 tablet by mouth every 8 hours as needed for Pain for up to 30 days. , Disp-60 tablet, R-2Normal  As above, painful joints of hands and shoulders with decreased dexterity. Tramadol to aid ADLs. 7. Encounter for screening for malignant neoplasm of prostate  -     PSA Screening; Future  8. Rheumatoid arthritis of multiple sites with negative rheumatoid factor ([facility] [address]  Followed by rheumatology. 9.  Low vit B12 level         - Methylmalonic acid, future         Return in about 3 months (around 4/5/2023) for Follow-Up, DM2, HTN.  current treatment plan is effective, no change in therapy  lab results and schedule of future lab studies reviewed with patient  reviewed diet, exercise and weight control  cardiovascular risk and specific lipid/LDL goals reviewed  reviewed medications and side effects in detail  specific diabetic recommendations: low cholesterol diet, weight control and daily exercise discussed, all medications, side effects and compliance discussed carefully, foot care discussed and Podiatry visits discussed, annual eye examinations at Ophthalmology discussed, glycohemoglobin and other lab monitoring discussed, long term diabetic complications discussed, and labs immediately prior to next visit      Salomon King APRN - NP    Dictated using voice recognition software.  Proofread, but unrecognized voice recognition errors may exist.

## 2023-01-29 ASSESSMENT — ENCOUNTER SYMPTOMS
SHORTNESS OF BREATH: 0
ABDOMINAL PAIN: 0
WHEEZING: 0
CHEST TIGHTNESS: 0

## 2023-03-02 DIAGNOSIS — M25.50 POLYARTHRALGIA: Primary | ICD-10-CM

## 2023-03-02 RX ORDER — ALENDRONATE SODIUM 70 MG/1
TABLET ORAL
COMMUNITY
Start: 2023-01-10

## 2023-03-02 RX ORDER — TRAMADOL HYDROCHLORIDE 50 MG/1
TABLET ORAL
COMMUNITY
Start: 2023-02-04 | End: 2023-03-03 | Stop reason: SDUPTHER

## 2023-03-02 NOTE — TELEPHONE ENCOUNTER
Request for refill of tramadol to [facility] on [address] [address] [facility]

## 2023-03-03 RX ORDER — TRAMADOL HYDROCHLORIDE 50 MG/1
50 TABLET ORAL EVERY 8 HOURS PRN
Qty: 60 TABLET | Refills: 2 | Status: SHIPPED | OUTPATIENT
Start: 2023-03-03 | End: 2023-04-02

## 2023-04-03 DIAGNOSIS — M25.50 POLYARTHRALGIA: Primary | ICD-10-CM

## 2023-04-03 DIAGNOSIS — M06.09 RHEUMATOID ARTHRITIS OF MULTIPLE SITES WITH NEGATIVE RHEUMATOID FACTOR (HCC): ICD-10-CM

## 2023-04-06 RX ORDER — TRAMADOL HYDROCHLORIDE 50 MG/1
50 TABLET ORAL EVERY 8 HOURS PRN
COMMUNITY
End: 2023-04-06 | Stop reason: SDUPTHER

## 2023-04-06 RX ORDER — TRAMADOL HYDROCHLORIDE 50 MG/1
50 TABLET ORAL EVERY 8 HOURS PRN
Qty: 60 TABLET | Refills: 0 | Status: SHIPPED | OUTPATIENT
Start: 2023-04-06 | End: 2023-05-06

## 2023-05-02 ENCOUNTER — OFFICE VISIT (OUTPATIENT)
Dept: INTERNAL MEDICINE CLINIC | Facility: CLINIC | Age: 78
End: 2023-05-02
Payer: MEDICARE

## 2023-05-02 VITALS
WEIGHT: 211.4 LBS | BODY MASS INDEX: 30.26 KG/M2 | HEIGHT: 70 IN | DIASTOLIC BLOOD PRESSURE: 79 MMHG | RESPIRATION RATE: 18 BRPM | SYSTOLIC BLOOD PRESSURE: 137 MMHG | OXYGEN SATURATION: 96 % | HEART RATE: 92 BPM

## 2023-05-02 DIAGNOSIS — Z79.4 TYPE 2 DIABETES MELLITUS WITH DIABETIC NEUROPATHY, WITH LONG-TERM CURRENT USE OF INSULIN (HCC): Primary | ICD-10-CM

## 2023-05-02 DIAGNOSIS — M25.50 POLYARTHRALGIA: ICD-10-CM

## 2023-05-02 DIAGNOSIS — R97.20 ELEVATED PSA: ICD-10-CM

## 2023-05-02 DIAGNOSIS — M06.09 RHEUMATOID ARTHRITIS OF MULTIPLE SITES WITH NEGATIVE RHEUMATOID FACTOR (HCC): ICD-10-CM

## 2023-05-02 DIAGNOSIS — Z79.52 LONG TERM CURRENT USE OF SYSTEMIC STEROIDS: ICD-10-CM

## 2023-05-02 DIAGNOSIS — E11.40 TYPE 2 DIABETES MELLITUS WITH DIABETIC NEUROPATHY, WITH LONG-TERM CURRENT USE OF INSULIN (HCC): Primary | ICD-10-CM

## 2023-05-02 DIAGNOSIS — D50.8 IRON DEFICIENCY ANEMIA SECONDARY TO INADEQUATE DIETARY IRON INTAKE: ICD-10-CM

## 2023-05-02 PROCEDURE — 3052F HG A1C>EQUAL 8.0%<EQUAL 9.0%: CPT | Performed by: NURSE PRACTITIONER

## 2023-05-02 PROCEDURE — 3074F SYST BP LT 130 MM HG: CPT | Performed by: NURSE PRACTITIONER

## 2023-05-02 PROCEDURE — 99214 OFFICE O/P EST MOD 30 MIN: CPT | Performed by: NURSE PRACTITIONER

## 2023-05-02 PROCEDURE — 3078F DIAST BP <80 MM HG: CPT | Performed by: NURSE PRACTITIONER

## 2023-05-02 PROCEDURE — 1123F ACP DISCUSS/DSCN MKR DOCD: CPT | Performed by: NURSE PRACTITIONER

## 2023-05-02 RX ORDER — TRAMADOL HYDROCHLORIDE 50 MG/1
50 TABLET ORAL EVERY 8 HOURS PRN
Qty: 60 TABLET | Refills: 0 | Status: SHIPPED | OUTPATIENT
Start: 2023-05-02 | End: 2023-06-01

## 2023-05-02 SDOH — ECONOMIC STABILITY: FOOD INSECURITY: WITHIN THE PAST 12 MONTHS, THE FOOD YOU BOUGHT JUST DIDN'T LAST AND YOU DIDN'T HAVE MONEY TO GET MORE.: SOMETIMES TRUE

## 2023-05-02 SDOH — ECONOMIC STABILITY: FOOD INSECURITY: WITHIN THE PAST 12 MONTHS, YOU WORRIED THAT YOUR FOOD WOULD RUN OUT BEFORE YOU GOT MONEY TO BUY MORE.: SOMETIMES TRUE

## 2023-05-02 SDOH — ECONOMIC STABILITY: HOUSING INSECURITY
IN THE LAST 12 MONTHS, WAS THERE A TIME WHEN YOU DID NOT HAVE A STEADY PLACE TO SLEEP OR SLEPT IN A SHELTER (INCLUDING NOW)?: YES

## 2023-05-02 SDOH — ECONOMIC STABILITY: INCOME INSECURITY: HOW HARD IS IT FOR YOU TO PAY FOR THE VERY BASICS LIKE FOOD, HOUSING, MEDICAL CARE, AND HEATING?: HARD

## 2023-05-02 ASSESSMENT — PATIENT HEALTH QUESTIONNAIRE - PHQ9
1. LITTLE INTEREST OR PLEASURE IN DOING THINGS: 0
SUM OF ALL RESPONSES TO PHQ QUESTIONS 1-9: 0
SUM OF ALL RESPONSES TO PHQ9 QUESTIONS 1 & 2: 0
2. FEELING DOWN, DEPRESSED OR HOPELESS: 0
SUM OF ALL RESPONSES TO PHQ QUESTIONS 1-9: 0

## 2023-05-15 ASSESSMENT — ENCOUNTER SYMPTOMS
CHEST TIGHTNESS: 0
ABDOMINAL PAIN: 0
SHORTNESS OF BREATH: 0
WHEEZING: 0

## 2023-05-15 NOTE — PROGRESS NOTES
PROGRESS NOTE    SUBJECTIVE:   Hilario Reyes is a 78 y.o. male seen for a follow up visit for   Chief Complaint   Patient presents with    3 Month Follow-Up     3 month follow up     Diabetes  He reports blood sugars fasting are typically 100's to 200's. He only tests his blood sugar about 3 times per week. This is a chronic problem. Episode onset: 2002. Treatments tried: Now on Tresiba U-100 and metformin. The treatment provided moderate relief. Hypertension   This is a chronic problem. Episode onset: 2000. Agents associated with hypertension include steroids. Risk factors include family history, diabetes mellitus, obesity, male gender, hypertension and smoking/tobacco exposure. Anemia  Pertinent negatives include no chest pain, no headaches and no shortness of breath. Rheumatoid arthritis  This is a chronic problem. Episode onset: > 10 years ago. He has persistent joint pain and stiffness and spite of treatment. Treatments tried: Prednisone, Arava and adalimumab. He has been on Humira in the past.         Reviewed and updated this visit by provider:  Tobacco  Allergies  Meds  Problems  Med Hx  Surg Hx  Fam Hx         Review of Systems   Constitutional:  Negative for chills, fatigue and fever. Respiratory:  Negative for chest tightness, shortness of breath and wheezing. Cardiovascular:  Negative for chest pain, palpitations and leg swelling. Gastrointestinal:  Negative for abdominal pain. Endocrine: Negative for polydipsia, polyphagia and polyuria. Genitourinary:  Negative for frequency. Musculoskeletal:  Positive for arthralgias (polyarthralgia) and neck stiffness. Negative for gait problem. Skin:  Negative for rash. Neurological:  Negative for weakness, numbness and headaches. Psychiatric/Behavioral:  Negative for dysphoric mood and sleep disturbance. The patient is not nervous/anxious and is not hyperactive.        OBJECTIVE:    /79 (Site: Right Upper Arm,

## 2023-05-16 ENCOUNTER — TELEPHONE (OUTPATIENT)
Dept: INTERNAL MEDICINE CLINIC | Facility: CLINIC | Age: 78
End: 2023-05-16

## 2023-05-16 NOTE — TELEPHONE ENCOUNTER
Patient notified per Agnieszka\"Please call Mr. Soren Ramos and tell him I have referred him to urology for the elevated PSA. We did not get a chance to fully review that at his last visit. \" Patient verbalized understanding.

## 2023-05-16 NOTE — TELEPHONE ENCOUNTER
----- Message from EMMA Coronado NP sent at 5/15/2023 12:59 AM EDT -----  Please call Mr. Pollard Real and tell him I have referred him to urology for the elevated PSA. We did not get a chance to fully review that at his last visit. Thanks,    EMMA Coronado NP    Dictated using voice recognition software.  Proofread, but unrecognized voice recognition errors may exist.

## 2023-06-01 DIAGNOSIS — M25.50 POLYARTHRALGIA: ICD-10-CM

## 2023-06-01 DIAGNOSIS — M06.09 RHEUMATOID ARTHRITIS OF MULTIPLE SITES WITH NEGATIVE RHEUMATOID FACTOR (HCC): ICD-10-CM

## 2023-06-01 RX ORDER — TRAMADOL HYDROCHLORIDE 50 MG/1
50 TABLET ORAL EVERY 8 HOURS PRN
Qty: 60 TABLET | Refills: 0 | Status: SHIPPED | OUTPATIENT
Start: 2023-06-01 | End: 2023-07-01

## 2023-06-01 NOTE — TELEPHONE ENCOUNTER
Request for refill of tramadol to [facility] on [facility]

## 2023-06-09 ENCOUNTER — HOSPITAL ENCOUNTER (OUTPATIENT)
Dept: GENERAL RADIOLOGY | Age: 78
End: 2023-06-09
Payer: MEDICARE

## 2023-06-09 DIAGNOSIS — M25.562 PAIN IN BOTH KNEES, UNSPECIFIED CHRONICITY: ICD-10-CM

## 2023-06-09 DIAGNOSIS — M25.561 PAIN IN BOTH KNEES, UNSPECIFIED CHRONICITY: ICD-10-CM

## 2023-06-09 PROCEDURE — 73565 X-RAY EXAM OF KNEES: CPT

## 2023-06-28 ENCOUNTER — TELEPHONE (OUTPATIENT)
Dept: INTERNAL MEDICINE CLINIC | Facility: CLINIC | Age: 78
End: 2023-06-28

## 2023-06-28 ENCOUNTER — OFFICE VISIT (OUTPATIENT)
Dept: UROLOGY | Age: 78
End: 2023-06-28
Payer: MEDICARE

## 2023-06-28 DIAGNOSIS — R97.20 ELEVATED PSA: Primary | ICD-10-CM

## 2023-06-28 DIAGNOSIS — M25.50 POLYARTHRALGIA: Primary | ICD-10-CM

## 2023-06-28 DIAGNOSIS — M06.09 RHEUMATOID ARTHRITIS OF MULTIPLE SITES WITH NEGATIVE RHEUMATOID FACTOR (HCC): ICD-10-CM

## 2023-06-28 LAB
BILIRUBIN, URINE, POC: NEGATIVE
BLOOD URINE, POC: NEGATIVE
GLUCOSE URINE, POC: 500
KETONES, URINE, POC: 15
LEUKOCYTE ESTERASE, URINE, POC: NEGATIVE
NITRITE, URINE, POC: NEGATIVE
PH, URINE, POC: 5.5 (ref 4.6–8)
PROTEIN,URINE, POC: 100
SPECIFIC GRAVITY, URINE, POC: 1.03 (ref 1–1.03)
URINALYSIS CLARITY, POC: NORMAL
URINALYSIS COLOR, POC: NORMAL
UROBILINOGEN, POC: NORMAL

## 2023-06-28 PROCEDURE — 81003 URINALYSIS AUTO W/O SCOPE: CPT | Performed by: UROLOGY

## 2023-06-28 PROCEDURE — 99204 OFFICE O/P NEW MOD 45 MIN: CPT | Performed by: UROLOGY

## 2023-06-28 PROCEDURE — 1123F ACP DISCUSS/DSCN MKR DOCD: CPT | Performed by: UROLOGY

## 2023-06-28 ASSESSMENT — ENCOUNTER SYMPTOMS
HEARTBURN: 0
SHORTNESS OF BREATH: 0
EYE DISCHARGE: 0
CONSTIPATION: 0
EYE PAIN: 0
COUGH: 0
NAUSEA: 0
VOMITING: 0
BACK PAIN: 1
DIARRHEA: 0
SKIN LESIONS: 0
BLOOD IN STOOL: 0
ABDOMINAL PAIN: 0
INDIGESTION: 0
WHEEZING: 0

## 2023-07-03 RX ORDER — VERAPAMIL HYDROCHLORIDE 180 MG/1
CAPSULE, EXTENDED RELEASE ORAL
Qty: 90 CAPSULE | Refills: 3 | OUTPATIENT
Start: 2023-07-03

## 2023-08-09 DIAGNOSIS — Z79.4 TYPE 2 DIABETES MELLITUS WITH DIABETIC NEUROPATHY, WITH LONG-TERM CURRENT USE OF INSULIN (HCC): ICD-10-CM

## 2023-08-09 DIAGNOSIS — E11.40 TYPE 2 DIABETES MELLITUS WITH DIABETIC NEUROPATHY, WITH LONG-TERM CURRENT USE OF INSULIN (HCC): ICD-10-CM

## 2023-08-09 DIAGNOSIS — I10 ESSENTIAL HYPERTENSION: ICD-10-CM

## 2023-08-09 RX ORDER — INSULIN DEGLUDEC 200 U/ML
INJECTION, SOLUTION SUBCUTANEOUS
Refills: 3 | OUTPATIENT
Start: 2023-08-09

## 2023-08-09 RX ORDER — OLMESARTAN MEDOXOMIL AND HYDROCHLOROTHIAZIDE 40/25 40; 25 MG/1; MG/1
TABLET ORAL
Qty: 90 TABLET | Refills: 3 | OUTPATIENT
Start: 2023-08-09

## 2023-08-09 RX ORDER — VERAPAMIL HYDROCHLORIDE 180 MG/1
CAPSULE, EXTENDED RELEASE ORAL
Qty: 90 CAPSULE | Refills: 3 | OUTPATIENT
Start: 2023-08-09

## 2023-08-10 ENCOUNTER — TELEPHONE (OUTPATIENT)
Dept: INTERNAL MEDICINE CLINIC | Facility: CLINIC | Age: 78
End: 2023-08-10

## 2023-08-10 NOTE — TELEPHONE ENCOUNTER
:Patient is requesting a new prescription for the Tramadol 50 MG tablet to be sent to [facility] on [address] in [facility]. Rx last filled on 05/02/23.  Upcoming appointment tomorrow at 9:30 AM.

## 2023-08-10 NOTE — TELEPHONE ENCOUNTER
----- Message from [employer] sent at 8/9/2023  4:33 PM EDT -----  Subject: Refill Request    QUESTIONS  Name of Medication? traMADol (ULTRAM) 50 MG tablet  Patient-reported dosage and instructions? 50mg. unk. How many days do you have left? 0  Preferred Pharmacy? [id number]  Pharmacy phone number (if available)? [phone]  ---------------------------------------------------------------------------  --------------  Caryl CONDON  What is the best way for the office to contact you? Do not leave any   message, patient will call back for answer  Preferred Call Back Phone Number? [phone]  ---------------------------------------------------------------------------  --------------  SCRIPT ANSWERS  Relationship to Patient? Spouse/Partner  Representative Name? Janeth Bowles  Is the representative on the Communication Release of Information (HAYLIE)   form in Epic?  Yes

## 2023-08-10 NOTE — TELEPHONE ENCOUNTER
----- Message from Daniel Kothari sent at 8/9/2023  4:33 PM EDT -----  Subject: Refill Request    QUESTIONS  Name of Medication? traMADol (ULTRAM) 50 MG tablet  Patient-reported dosage and instructions? 50mg. unk. How many days do you have left? 0  Preferred Pharmacy? [id number]  Pharmacy phone number (if available)? [phone]  ---------------------------------------------------------------------------  --------------  Stephie CONDON  What is the best way for the office to contact you? Do not leave any   message, patient will call back for answer  Preferred Call Back Phone Number? [phone]  ---------------------------------------------------------------------------  --------------  SCRIPT ANSWERS  Relationship to Patient? Spouse/Partner  Representative Name? Jeana Hastings  Is the representative on the Communication Release of Information (HAYLIE)   form in Epic?  Yes

## 2023-08-11 DIAGNOSIS — D50.8 IRON DEFICIENCY ANEMIA SECONDARY TO INADEQUATE DIETARY IRON INTAKE: ICD-10-CM

## 2023-08-11 DIAGNOSIS — R97.20 ELEVATED PSA: ICD-10-CM

## 2023-08-11 DIAGNOSIS — D64.9 ANEMIA, UNSPECIFIED TYPE: ICD-10-CM

## 2023-08-11 DIAGNOSIS — Z79.4 TYPE 2 DIABETES MELLITUS WITH DIABETIC NEUROPATHY, WITH LONG-TERM CURRENT USE OF INSULIN (HCC): ICD-10-CM

## 2023-08-11 DIAGNOSIS — E11.40 TYPE 2 DIABETES MELLITUS WITH DIABETIC NEUROPATHY, WITH LONG-TERM CURRENT USE OF INSULIN (HCC): ICD-10-CM

## 2023-08-11 LAB
ALBUMIN SERPL-MCNC: 3.7 G/DL (ref 3.2–4.6)
ALBUMIN/GLOB SERPL: 1.1 (ref 0.4–1.6)
ALP SERPL-CCNC: 73 U/L (ref 50–136)
ALT SERPL-CCNC: 18 U/L (ref 12–65)
ANION GAP SERPL CALC-SCNC: 10 MMOL/L (ref 2–11)
AST SERPL-CCNC: 12 U/L (ref 15–37)
BILIRUB SERPL-MCNC: 0.3 MG/DL (ref 0.2–1.1)
BUN SERPL-MCNC: 21 MG/DL (ref 8–23)
CALCIUM SERPL-MCNC: 8.9 MG/DL (ref 8.3–10.4)
CHLORIDE SERPL-SCNC: 106 MMOL/L (ref 101–110)
CO2 SERPL-SCNC: 28 MMOL/L (ref 21–32)
CREAT SERPL-MCNC: 1.4 MG/DL (ref 0.8–1.5)
EST. AVERAGE GLUCOSE BLD GHB EST-MCNC: 217 MG/DL
FERRITIN SERPL-MCNC: 61 NG/ML (ref 8–388)
GLOBULIN SER CALC-MCNC: 3.3 G/DL (ref 2.8–4.5)
GLUCOSE SERPL-MCNC: 92 MG/DL (ref 65–100)
HBA1C MFR BLD: 9.2 % (ref 4.8–5.6)
POTASSIUM SERPL-SCNC: 3.9 MMOL/L (ref 3.5–5.1)
PROT SERPL-MCNC: 7 G/DL (ref 6.3–8.2)
SODIUM SERPL-SCNC: 144 MMOL/L (ref 133–143)

## 2023-08-11 NOTE — TELEPHONE ENCOUNTER
Called. He has been seen by Pain center and is undergoing x-rays/MRI. Pain Center has not initiated any meds. Yet. Recommend call and request refill.

## 2023-08-14 LAB
PSA FREE MFR SERPL: 31.6 %
PSA FREE SERPL-MCNC: 0.6 NG/ML
PSA SERPL-MCNC: 1.9 NG/ML

## 2023-08-15 NOTE — RESULT ENCOUNTER NOTE
Mr. Mingo Serna, your PSA has returned back to normal at 1.9. This is great news! It appears that your test 3 months ago was the outlier. I would recommend another pSA check in 6 months to make sure that it is staying down. You can do this with your primary care doctor or our office. If you would like it done at our office, then please let me know. If that is also normal, then I would recommend that you resume once per year checks (like normal) wit your primary care doctor and return to see me as needed if it elevates again.      Dr. Ghulam Newman

## 2023-08-18 ENCOUNTER — OFFICE VISIT (OUTPATIENT)
Dept: INTERNAL MEDICINE CLINIC | Facility: CLINIC | Age: 78
End: 2023-08-18
Payer: MEDICARE

## 2023-08-18 VITALS
DIASTOLIC BLOOD PRESSURE: 90 MMHG | BODY MASS INDEX: 29.92 KG/M2 | HEIGHT: 70 IN | SYSTOLIC BLOOD PRESSURE: 140 MMHG | WEIGHT: 209 LBS

## 2023-08-18 DIAGNOSIS — E11.621 DIABETIC ULCER OF TOE OF RIGHT FOOT ASSOCIATED WITH TYPE 2 DIABETES MELLITUS, UNSPECIFIED ULCER STAGE (HCC): ICD-10-CM

## 2023-08-18 DIAGNOSIS — E11.40 TYPE 2 DIABETES MELLITUS WITH DIABETIC NEUROPATHY, WITH LONG-TERM CURRENT USE OF INSULIN (HCC): ICD-10-CM

## 2023-08-18 DIAGNOSIS — L97.529 DIABETIC ULCER OF TOE OF LEFT FOOT ASSOCIATED WITH TYPE 2 DIABETES MELLITUS, UNSPECIFIED ULCER STAGE (HCC): Primary | ICD-10-CM

## 2023-08-18 DIAGNOSIS — E11.621 DIABETIC ULCER OF TOE OF LEFT FOOT ASSOCIATED WITH TYPE 2 DIABETES MELLITUS, UNSPECIFIED ULCER STAGE (HCC): Primary | ICD-10-CM

## 2023-08-18 DIAGNOSIS — F11.99 OPIOID USE, UNSPECIFIED WITH UNSPECIFIED OPIOID-INDUCED DISORDER (HCC): ICD-10-CM

## 2023-08-18 DIAGNOSIS — I10 ESSENTIAL HYPERTENSION: ICD-10-CM

## 2023-08-18 DIAGNOSIS — Z79.4 TYPE 2 DIABETES MELLITUS WITH DIABETIC NEUROPATHY, WITH LONG-TERM CURRENT USE OF INSULIN (HCC): ICD-10-CM

## 2023-08-18 DIAGNOSIS — L97.519 DIABETIC ULCER OF TOE OF RIGHT FOOT ASSOCIATED WITH TYPE 2 DIABETES MELLITUS, UNSPECIFIED ULCER STAGE (HCC): ICD-10-CM

## 2023-08-18 DIAGNOSIS — N40.1 BENIGN PROSTATIC HYPERPLASIA WITH LOWER URINARY TRACT SYMPTOMS, SYMPTOM DETAILS UNSPECIFIED: ICD-10-CM

## 2023-08-18 PROCEDURE — 1123F ACP DISCUSS/DSCN MKR DOCD: CPT | Performed by: NURSE PRACTITIONER

## 2023-08-18 PROCEDURE — 3046F HEMOGLOBIN A1C LEVEL >9.0%: CPT | Performed by: NURSE PRACTITIONER

## 2023-08-18 PROCEDURE — 99215 OFFICE O/P EST HI 40 MIN: CPT | Performed by: NURSE PRACTITIONER

## 2023-08-18 PROCEDURE — 3078F DIAST BP <80 MM HG: CPT | Performed by: NURSE PRACTITIONER

## 2023-08-18 PROCEDURE — 3074F SYST BP LT 130 MM HG: CPT | Performed by: NURSE PRACTITIONER

## 2023-08-18 RX ORDER — TRAMADOL HYDROCHLORIDE 50 MG/1
50 TABLET ORAL EVERY 6 HOURS PRN
COMMUNITY
Start: 2023-07-01

## 2023-08-18 RX ORDER — TAMSULOSIN HYDROCHLORIDE 0.4 MG/1
0.4 CAPSULE ORAL DAILY
Qty: 90 CAPSULE | Refills: 3 | Status: SHIPPED | OUTPATIENT
Start: 2023-08-18

## 2023-08-18 RX ORDER — VERAPAMIL HYDROCHLORIDE 180 MG/1
180 CAPSULE, EXTENDED RELEASE ORAL DAILY
Qty: 90 CAPSULE | Refills: 3 | Status: SHIPPED | OUTPATIENT
Start: 2023-08-18

## 2023-08-18 RX ORDER — OLMESARTAN MEDOXOMIL AND HYDROCHLOROTHIAZIDE 40/25 40; 25 MG/1; MG/1
1 TABLET ORAL DAILY
Qty: 90 TABLET | Refills: 3 | Status: SHIPPED | OUTPATIENT
Start: 2023-08-18

## 2023-08-18 RX ORDER — INSULIN DEGLUDEC 200 U/ML
INJECTION, SOLUTION SUBCUTANEOUS
Qty: 10 ADJUSTABLE DOSE PRE-FILLED PEN SYRINGE | Refills: 11 | Status: SHIPPED | OUTPATIENT
Start: 2023-08-18

## 2023-08-18 NOTE — PATIENT INSTRUCTIONS
Wound care:  Twice a day  1. Clean with wound irrigation spray  2. Apply antibiotic ointment to edges of wound  3. Cover with non-adherent dressing  4.   Wrap with gauze (NO adhesive tape to skin)

## 2023-08-18 NOTE — PROGRESS NOTES
discussed and Podiatry visits discussed, glycohemoglobin and other lab monitoring discussed, long term diabetic complications discussed, and labs immediately prior to next visit  Recommend referral to wound care center for left great toe pressure ulcer/open wound. He is diabetic and is immunosuppressed secondary to prednisone and Arava therapy. He is advised on wound care until he can be seen by wound care center. DM-- fasting glucose of 92 but hemoglobin A1c is 9.2%. he admits non-compliance to his diet. Emphasis on blood glucose control to aid in wound healing. He has also been cutting down on [place] to stretch out the supply. Currently on 37 units. Will increase to 40 units sq every evening. He is provided with sample and prescription is refilled. Recommend call office after 2-3 weeks with blood sugars. Blood pressure is borderline. He reports compliance to meds. He does not restrict sodium in his diet. Re-evaluate. He is using tamsulosin appropriately. Stable. He has been referred to pain clinic for chronic joint pain related to rheumatoid arthritis. Previously controlled on tramadol but recently does not think it is controlling his pain. He has had multiple x-rays with pain management. In regards to the toe wound he is advised to discuss with podiatry about getting a pair of diabetic shoes which will have a deeper toe box. He agrees. Left great toe wound cleaned with wound cleanser, mupirocin applied to wound base to edge of the wound, covered with non-adherent gauze pad and wrapped. On this date 08/18/23 I have spent 51 minutes reviewing previous notes, test results and face to face with the patient including wound care/instruction. Over 50% of today's office visit was spent in face to face time in counseling reviewing test results, importance of compliance, education about disease process, benefits and side-effects of medications and follow-up plan.       EMMA Stevens -

## 2023-08-27 ASSESSMENT — ENCOUNTER SYMPTOMS
WHEEZING: 0
SHORTNESS OF BREATH: 0
CHEST TIGHTNESS: 0
ABDOMINAL PAIN: 0

## 2023-09-08 DIAGNOSIS — E11.40 TYPE 2 DIABETES MELLITUS WITH DIABETIC NEUROPATHY, WITH LONG-TERM CURRENT USE OF INSULIN (HCC): ICD-10-CM

## 2023-09-08 DIAGNOSIS — Z79.4 TYPE 2 DIABETES MELLITUS WITH DIABETIC NEUROPATHY, WITH LONG-TERM CURRENT USE OF INSULIN (HCC): ICD-10-CM

## 2023-09-11 RX ORDER — INSULIN DEGLUDEC 200 U/ML
INJECTION, SOLUTION SUBCUTANEOUS
Refills: 3 | OUTPATIENT
Start: 2023-09-11

## 2023-10-03 ENCOUNTER — OFFICE VISIT (OUTPATIENT)
Dept: NEUROSURGERY | Age: 78
End: 2023-10-03
Payer: MEDICARE

## 2023-10-03 VITALS
DIASTOLIC BLOOD PRESSURE: 82 MMHG | TEMPERATURE: 97.9 F | WEIGHT: 205 LBS | OXYGEN SATURATION: 96 % | BODY MASS INDEX: 29.35 KG/M2 | HEIGHT: 70 IN | SYSTOLIC BLOOD PRESSURE: 181 MMHG | HEART RATE: 98 BPM

## 2023-10-03 DIAGNOSIS — M48.062 SPINAL STENOSIS OF LUMBAR REGION WITH NEUROGENIC CLAUDICATION: Primary | ICD-10-CM

## 2023-10-03 PROCEDURE — 3079F DIAST BP 80-89 MM HG: CPT | Performed by: NEUROLOGICAL SURGERY

## 2023-10-03 PROCEDURE — 99204 OFFICE O/P NEW MOD 45 MIN: CPT | Performed by: NEUROLOGICAL SURGERY

## 2023-10-03 PROCEDURE — 3077F SYST BP >= 140 MM HG: CPT | Performed by: NEUROLOGICAL SURGERY

## 2023-10-03 PROCEDURE — 1123F ACP DISCUSS/DSCN MKR DOCD: CPT | Performed by: NEUROLOGICAL SURGERY

## 2023-10-03 ASSESSMENT — ENCOUNTER SYMPTOMS: BACK PAIN: 1

## 2023-10-03 NOTE — ASSESSMENT & PLAN NOTE
Mr. Christiane Castillo is a 81-year-old gentleman with severe spinal stenosis mainly at L3-4 L4-5. He is profoundly symptomatic from this and after discussing options with he and his daughter we feel that we should move forward with a decompression of L3-4 and L4-5. I discussed risk benefits indications limitations details of intervention and the options of intervention they asked questions and they both felt comfortable proceeding. We will get a primary care physician clearance for surgery and then we will get him scheduled for bilateral decompression L3-4 and L4-5.

## 2023-10-03 NOTE — PROGRESS NOTES
abnormal. Tandem walk normal.      Comments: He has difficulty ambulating but a walker he does relatively well with contact guard. He has significant symmetrical bilateral lower extremity weakness. Sensation to light touch appears to be grossly within normal limits. Psychiatric:         Mood and Affect: Mood normal.         Behavior: Behavior normal.         Thought Content: Thought content normal.          Imaging: I personally reviewed the MRI of the lumbosacral spine from [facility] dated 9/12/2023 this was compared to a previous image from [facility] dated 1/11/2016. On his image from 2016 he was noted to have some multilevel stenosis but it was not severe this is progressed significantly in the intervening years at the L3-4 and L4-5 level it is quite severe with significant canal stenosis at the L4-5 level there is essentially no T2 bright CSF signal visualized on axial T2's. Problem List Items Addressed This Visit          Other    Spinal stenosis of lumbar region with neurogenic claudication - Primary     Mr. Devyn Hamilton is a 72-year-old gentleman with severe spinal stenosis mainly at L3-4 L4-5. He is profoundly symptomatic from this and after discussing options with he and his daughter we feel that we should move forward with a decompression of L3-4 and L4-5. I discussed risk benefits indications limitations details of intervention and the options of intervention they asked questions and they both felt comfortable proceeding. We will get a primary care physician clearance for surgery and then we will get him scheduled for bilateral decompression L3-4 and L4-5.              1. Spinal stenosis of lumbar region with neurogenic claudication  Assessment & Plan:  Mr. Devyn Hamilton is a 72-year-old gentleman with severe spinal stenosis mainly at L3-4 L4-5.   He is profoundly symptomatic from this and after discussing options with he and his daughter we feel that we should move forward with a decompression of L3-4

## 2023-11-15 ENCOUNTER — TELEPHONE (OUTPATIENT)
Dept: INTERNAL MEDICINE CLINIC | Facility: CLINIC | Age: 78
End: 2023-11-15

## 2023-11-15 ENCOUNTER — PREP FOR PROCEDURE (OUTPATIENT)
Dept: NEUROSURGERY | Age: 78
End: 2023-11-15

## 2023-11-15 DIAGNOSIS — M48.062 SPINAL STENOSIS, LUMBAR REGION, WITH NEUROGENIC CLAUDICATION: ICD-10-CM

## 2023-11-15 NOTE — TELEPHONE ENCOUNTER
Dr Von Blizzard, [address] [place] [address], [id number] DT needs surgical clearance from PCP  for surgery on Dec 1st.

## 2023-11-17 NOTE — TELEPHONE ENCOUNTER
It looks like he is already on the schedule for Monday, 11/20/2023 at 1:30 PM.  We can convert that appointment to his preop. He will need to come in 15 to 30 minutes early so that he can have an EKG done before his appointment time.

## 2023-11-17 NOTE — TELEPHONE ENCOUNTER
Called and notified patient of his upcoming appointment on Monday, November 20, 2023 at 1:30 PM (Pre-Op Clearance Visit). Also informed patient to arrive at least 15 to 30 minutes early to get an EKG prior to seeing [facility]. Patient verbalized understanding.

## 2023-11-20 ENCOUNTER — HOSPITAL ENCOUNTER (INPATIENT)
Age: 78
LOS: 4 days | Discharge: HOME | End: 2023-11-24
Attending: EMERGENCY MEDICINE | Admitting: FAMILY MEDICINE
Payer: MEDICARE

## 2023-11-20 ENCOUNTER — OFFICE VISIT (OUTPATIENT)
Dept: INTERNAL MEDICINE CLINIC | Facility: CLINIC | Age: 78
End: 2023-11-20
Payer: MEDICARE

## 2023-11-20 ENCOUNTER — APPOINTMENT (OUTPATIENT)
Dept: GENERAL RADIOLOGY | Age: 78
End: 2023-11-20
Payer: MEDICARE

## 2023-11-20 VITALS
DIASTOLIC BLOOD PRESSURE: 68 MMHG | BODY MASS INDEX: 29.98 KG/M2 | OXYGEN SATURATION: 96 % | HEART RATE: 92 BPM | SYSTOLIC BLOOD PRESSURE: 150 MMHG | HEIGHT: 70 IN | WEIGHT: 209.4 LBS

## 2023-11-20 DIAGNOSIS — A41.9 SEPTICEMIA (HCC): ICD-10-CM

## 2023-11-20 DIAGNOSIS — L97.529 DIABETIC ULCER OF TOE OF LEFT FOOT ASSOCIATED WITH TYPE 2 DIABETES MELLITUS, UNSPECIFIED ULCER STAGE (HCC): ICD-10-CM

## 2023-11-20 DIAGNOSIS — E11.621 DIABETIC ULCER OF TOE OF LEFT FOOT ASSOCIATED WITH TYPE 2 DIABETES MELLITUS, UNSPECIFIED ULCER STAGE (HCC): ICD-10-CM

## 2023-11-20 DIAGNOSIS — N18.30 STAGE 3 CHRONIC KIDNEY DISEASE, UNSPECIFIED WHETHER STAGE 3A OR 3B CKD (HCC): ICD-10-CM

## 2023-11-20 DIAGNOSIS — E11.628 DIABETIC FOOT INFECTION (HCC): ICD-10-CM

## 2023-11-20 DIAGNOSIS — L08.9 DIABETIC FOOT INFECTION (HCC): ICD-10-CM

## 2023-11-20 DIAGNOSIS — L08.9 DIABETIC FOOT INFECTION (HCC): Primary | ICD-10-CM

## 2023-11-20 DIAGNOSIS — E11.628 DIABETIC FOOT INFECTION (HCC): Primary | ICD-10-CM

## 2023-11-20 DIAGNOSIS — Z01.818 PRE-OPERATIVE CLEARANCE: Primary | ICD-10-CM

## 2023-11-20 PROBLEM — E11.65 UNCONTROLLED TYPE 2 DIABETES MELLITUS WITH HYPERGLYCEMIA, WITH LONG-TERM CURRENT USE OF INSULIN (HCC): Status: ACTIVE | Noted: 2018-12-30

## 2023-11-20 PROBLEM — Z79.4 UNCONTROLLED TYPE 2 DIABETES MELLITUS WITH HYPERGLYCEMIA, WITH LONG-TERM CURRENT USE OF INSULIN (HCC): Status: ACTIVE | Noted: 2018-12-30

## 2023-11-20 PROBLEM — E11.40 TYPE 2 DIABETES MELLITUS WITH DIABETIC NEUROPATHY (HCC): Status: ACTIVE | Noted: 2018-03-14

## 2023-11-20 LAB
ALBUMIN SERPL-MCNC: 3.7 G/DL (ref 3.2–4.6)
ALBUMIN/GLOB SERPL: 1 (ref 0.4–1.6)
ALP SERPL-CCNC: 63 U/L (ref 50–136)
ALT SERPL-CCNC: 18 U/L (ref 12–65)
ANION GAP SERPL CALC-SCNC: 10 MMOL/L (ref 2–11)
APPEARANCE UR: CLEAR
AST SERPL-CCNC: 12 U/L (ref 15–37)
BASOPHILS # BLD: 0.1 K/UL (ref 0–0.2)
BASOPHILS NFR BLD: 1 % (ref 0–2)
BILIRUB SERPL-MCNC: 0.2 MG/DL (ref 0.2–1.1)
BILIRUB UR QL: NEGATIVE
BUN SERPL-MCNC: 31 MG/DL (ref 8–23)
CALCIUM SERPL-MCNC: 9.8 MG/DL (ref 8.3–10.4)
CHLORIDE SERPL-SCNC: 106 MMOL/L (ref 101–110)
CO2 SERPL-SCNC: 26 MMOL/L (ref 21–32)
COLOR UR: NORMAL
CREAT SERPL-MCNC: 1.7 MG/DL (ref 0.8–1.5)
CRP SERPL-MCNC: 0.4 MG/DL (ref 0–0.9)
DIFFERENTIAL METHOD BLD: ABNORMAL
EOSINOPHIL # BLD: 0 K/UL (ref 0–0.8)
EOSINOPHIL NFR BLD: 0 % (ref 0.5–7.8)
ERYTHROCYTE [DISTWIDTH] IN BLOOD BY AUTOMATED COUNT: 14.6 % (ref 11.9–14.6)
GLOBULIN SER CALC-MCNC: 3.8 G/DL (ref 2.8–4.5)
GLUCOSE BLD STRIP.AUTO-MCNC: 63 MG/DL (ref 65–100)
GLUCOSE BLD STRIP.AUTO-MCNC: 86 MG/DL (ref 65–100)
GLUCOSE SERPL-MCNC: 111 MG/DL (ref 65–100)
GLUCOSE UR STRIP.AUTO-MCNC: NEGATIVE MG/DL
HCT VFR BLD AUTO: 34.8 % (ref 41.1–50.3)
HGB BLD-MCNC: 10.5 G/DL (ref 13.6–17.2)
HGB UR QL STRIP: NEGATIVE
IMM GRANULOCYTES # BLD AUTO: 0 K/UL (ref 0–0.5)
IMM GRANULOCYTES NFR BLD AUTO: 0 % (ref 0–5)
KETONES UR QL STRIP.AUTO: NEGATIVE MG/DL
LACTATE SERPL-SCNC: 3.7 MMOL/L (ref 0.4–2)
LACTATE SERPL-SCNC: 6.3 MMOL/L (ref 0.4–2)
LEUKOCYTE ESTERASE UR QL STRIP.AUTO: NEGATIVE
LYMPHOCYTES # BLD: 0.7 K/UL (ref 0.5–4.6)
LYMPHOCYTES NFR BLD: 7 % (ref 13–44)
MCH RBC QN AUTO: 29.6 PG (ref 26.1–32.9)
MCHC RBC AUTO-ENTMCNC: 30.2 G/DL (ref 31.4–35)
MCV RBC AUTO: 98 FL (ref 82–102)
MONOCYTES # BLD: 0.4 K/UL (ref 0.1–1.3)
MONOCYTES NFR BLD: 4 % (ref 4–12)
NEUTS SEG # BLD: 9.7 K/UL (ref 1.7–8.2)
NEUTS SEG NFR BLD: 89 % (ref 43–78)
NITRITE UR QL STRIP.AUTO: NEGATIVE
NRBC # BLD: 0 K/UL (ref 0–0.2)
PH UR STRIP: 5 (ref 5–9)
PLATELET # BLD AUTO: 345 K/UL (ref 150–450)
PMV BLD AUTO: 9.5 FL (ref 9.4–12.3)
POTASSIUM SERPL-SCNC: 4.4 MMOL/L (ref 3.5–5.1)
PROCALCITONIN SERPL-MCNC: <0.05 NG/ML (ref 0–0.49)
PROT SERPL-MCNC: 7.5 G/DL (ref 6.3–8.2)
PROT UR STRIP-MCNC: NEGATIVE MG/DL
RBC # BLD AUTO: 3.55 M/UL (ref 4.23–5.6)
SERVICE CMNT-IMP: ABNORMAL
SERVICE CMNT-IMP: NORMAL
SODIUM SERPL-SCNC: 142 MMOL/L (ref 133–143)
SP GR UR REFRACTOMETRY: 1.02 (ref 1–1.02)
UROBILINOGEN UR QL STRIP.AUTO: 0.2 EU/DL (ref 0.2–1)
WBC # BLD AUTO: 10.9 K/UL (ref 4.3–11.1)

## 2023-11-20 PROCEDURE — 6370000000 HC RX 637 (ALT 250 FOR IP): Performed by: FAMILY MEDICINE

## 2023-11-20 PROCEDURE — 71045 X-RAY EXAM CHEST 1 VIEW: CPT

## 2023-11-20 PROCEDURE — 86140 C-REACTIVE PROTEIN: CPT

## 2023-11-20 PROCEDURE — 85025 COMPLETE CBC W/AUTO DIFF WBC: CPT

## 2023-11-20 PROCEDURE — 6360000002 HC RX W HCPCS: Performed by: FAMILY MEDICINE

## 2023-11-20 PROCEDURE — 2580000003 HC RX 258

## 2023-11-20 PROCEDURE — 87070 CULTURE OTHR SPECIMN AEROBIC: CPT

## 2023-11-20 PROCEDURE — 87040 BLOOD CULTURE FOR BACTERIA: CPT

## 2023-11-20 PROCEDURE — 1100000003 HC PRIVATE W/ TELEMETRY

## 2023-11-20 PROCEDURE — 96365 THER/PROPH/DIAG IV INF INIT: CPT

## 2023-11-20 PROCEDURE — 2580000003 HC RX 258: Performed by: FAMILY MEDICINE

## 2023-11-20 PROCEDURE — 2500000003 HC RX 250 WO HCPCS: Performed by: FAMILY MEDICINE

## 2023-11-20 PROCEDURE — 36415 COLL VENOUS BLD VENIPUNCTURE: CPT

## 2023-11-20 PROCEDURE — 99215 OFFICE O/P EST HI 40 MIN: CPT | Performed by: NURSE PRACTITIONER

## 2023-11-20 PROCEDURE — 73630 X-RAY EXAM OF FOOT: CPT

## 2023-11-20 PROCEDURE — 3078F DIAST BP <80 MM HG: CPT | Performed by: NURSE PRACTITIONER

## 2023-11-20 PROCEDURE — 6360000002 HC RX W HCPCS

## 2023-11-20 PROCEDURE — 87077 CULTURE AEROBIC IDENTIFY: CPT

## 2023-11-20 PROCEDURE — 83605 ASSAY OF LACTIC ACID: CPT

## 2023-11-20 PROCEDURE — 81003 URINALYSIS AUTO W/O SCOPE: CPT

## 2023-11-20 PROCEDURE — 99285 EMERGENCY DEPT VISIT HI MDM: CPT

## 2023-11-20 PROCEDURE — 93005 ELECTROCARDIOGRAM TRACING: CPT

## 2023-11-20 PROCEDURE — 80053 COMPREHEN METABOLIC PANEL: CPT

## 2023-11-20 PROCEDURE — 3074F SYST BP LT 130 MM HG: CPT | Performed by: NURSE PRACTITIONER

## 2023-11-20 PROCEDURE — 82962 GLUCOSE BLOOD TEST: CPT

## 2023-11-20 PROCEDURE — 87186 SC STD MICRODIL/AGAR DIL: CPT

## 2023-11-20 PROCEDURE — 96375 TX/PRO/DX INJ NEW DRUG ADDON: CPT

## 2023-11-20 PROCEDURE — 1123F ACP DISCUSS/DSCN MKR DOCD: CPT | Performed by: NURSE PRACTITIONER

## 2023-11-20 PROCEDURE — 84145 PROCALCITONIN (PCT): CPT

## 2023-11-20 PROCEDURE — 3046F HEMOGLOBIN A1C LEVEL >9.0%: CPT | Performed by: NURSE PRACTITIONER

## 2023-11-20 PROCEDURE — 93000 ELECTROCARDIOGRAM COMPLETE: CPT | Performed by: NURSE PRACTITIONER

## 2023-11-20 PROCEDURE — 87205 SMEAR GRAM STAIN: CPT

## 2023-11-20 RX ORDER — DEXTROSE MONOHYDRATE 100 MG/ML
INJECTION, SOLUTION INTRAVENOUS CONTINUOUS PRN
Status: DISCONTINUED | OUTPATIENT
Start: 2023-11-20 | End: 2023-11-24 | Stop reason: HOSPADM

## 2023-11-20 RX ORDER — MAGNESIUM HYDROXIDE/ALUMINUM HYDROXICE/SIMETHICONE 120; 1200; 1200 MG/30ML; MG/30ML; MG/30ML
30 SUSPENSION ORAL EVERY 6 HOURS PRN
Status: DISCONTINUED | OUTPATIENT
Start: 2023-11-20 | End: 2023-11-24 | Stop reason: HOSPADM

## 2023-11-20 RX ORDER — 0.9 % SODIUM CHLORIDE 0.9 %
30 INTRAVENOUS SOLUTION INTRAVENOUS ONCE
Status: COMPLETED | OUTPATIENT
Start: 2023-11-20 | End: 2023-11-20

## 2023-11-20 RX ORDER — BISACODYL 10 MG
10 SUPPOSITORY, RECTAL RECTAL DAILY PRN
Status: DISCONTINUED | OUTPATIENT
Start: 2023-11-20 | End: 2023-11-24 | Stop reason: HOSPADM

## 2023-11-20 RX ORDER — INSULIN GLARGINE 100 [IU]/ML
32 INJECTION, SOLUTION SUBCUTANEOUS NIGHTLY
Status: DISCONTINUED | OUTPATIENT
Start: 2023-11-20 | End: 2023-11-22

## 2023-11-20 RX ORDER — ACETAMINOPHEN 650 MG/1
650 SUPPOSITORY RECTAL EVERY 6 HOURS PRN
Status: DISCONTINUED | OUTPATIENT
Start: 2023-11-20 | End: 2023-11-24 | Stop reason: HOSPADM

## 2023-11-20 RX ORDER — ONDANSETRON 4 MG/1
4 TABLET, ORALLY DISINTEGRATING ORAL EVERY 8 HOURS PRN
Status: DISCONTINUED | OUTPATIENT
Start: 2023-11-20 | End: 2023-11-24 | Stop reason: HOSPADM

## 2023-11-20 RX ORDER — MAGNESIUM SULFATE IN WATER 40 MG/ML
2000 INJECTION, SOLUTION INTRAVENOUS PRN
Status: DISCONTINUED | OUTPATIENT
Start: 2023-11-20 | End: 2023-11-24 | Stop reason: HOSPADM

## 2023-11-20 RX ORDER — TRAMADOL HYDROCHLORIDE 50 MG/1
50 TABLET ORAL EVERY 6 HOURS PRN
Status: DISCONTINUED | OUTPATIENT
Start: 2023-11-20 | End: 2023-11-24 | Stop reason: HOSPADM

## 2023-11-20 RX ORDER — INSULIN LISPRO 100 [IU]/ML
0-4 INJECTION, SOLUTION INTRAVENOUS; SUBCUTANEOUS NIGHTLY
Status: DISCONTINUED | OUTPATIENT
Start: 2023-11-20 | End: 2023-11-24 | Stop reason: HOSPADM

## 2023-11-20 RX ORDER — LOSARTAN POTASSIUM 50 MG/1
100 TABLET ORAL DAILY
Status: DISCONTINUED | OUTPATIENT
Start: 2023-11-21 | End: 2023-11-24 | Stop reason: HOSPADM

## 2023-11-20 RX ORDER — PREDNISONE 10 MG/1
5 TABLET ORAL DAILY
Status: DISCONTINUED | OUTPATIENT
Start: 2023-11-21 | End: 2023-11-24 | Stop reason: HOSPADM

## 2023-11-20 RX ORDER — ACETAMINOPHEN 325 MG/1
650 TABLET ORAL EVERY 6 HOURS PRN
Status: DISCONTINUED | OUTPATIENT
Start: 2023-11-20 | End: 2023-11-24 | Stop reason: HOSPADM

## 2023-11-20 RX ORDER — TAMSULOSIN HYDROCHLORIDE 0.4 MG/1
0.4 CAPSULE ORAL DAILY
Status: DISCONTINUED | OUTPATIENT
Start: 2023-11-20 | End: 2023-11-24 | Stop reason: HOSPADM

## 2023-11-20 RX ORDER — ONDANSETRON 2 MG/ML
4 INJECTION INTRAMUSCULAR; INTRAVENOUS EVERY 6 HOURS PRN
Status: DISCONTINUED | OUTPATIENT
Start: 2023-11-20 | End: 2023-11-24 | Stop reason: HOSPADM

## 2023-11-20 RX ORDER — MORPHINE SULFATE 2 MG/ML
1 INJECTION, SOLUTION INTRAMUSCULAR; INTRAVENOUS EVERY 8 HOURS PRN
Status: DISCONTINUED | OUTPATIENT
Start: 2023-11-20 | End: 2023-11-24 | Stop reason: HOSPADM

## 2023-11-20 RX ORDER — HYDROCHLOROTHIAZIDE 25 MG/1
25 TABLET ORAL DAILY
Status: DISCONTINUED | OUTPATIENT
Start: 2023-11-21 | End: 2023-11-24 | Stop reason: HOSPADM

## 2023-11-20 RX ORDER — INSULIN LISPRO 100 [IU]/ML
0-8 INJECTION, SOLUTION INTRAVENOUS; SUBCUTANEOUS
Status: DISCONTINUED | OUTPATIENT
Start: 2023-11-20 | End: 2023-11-24 | Stop reason: HOSPADM

## 2023-11-20 RX ORDER — OLMESARTAN MEDOXOMIL AND HYDROCHLOROTHIAZIDE 40/25 40; 25 MG/1; MG/1
1 TABLET ORAL DAILY
Status: DISCONTINUED | OUTPATIENT
Start: 2023-11-21 | End: 2023-11-20

## 2023-11-20 RX ORDER — SODIUM CHLORIDE 9 MG/ML
INJECTION, SOLUTION INTRAVENOUS PRN
Status: DISCONTINUED | OUTPATIENT
Start: 2023-11-20 | End: 2023-11-24 | Stop reason: HOSPADM

## 2023-11-20 RX ORDER — SODIUM CHLORIDE 0.9 % (FLUSH) 0.9 %
5-40 SYRINGE (ML) INJECTION EVERY 12 HOURS SCHEDULED
Status: DISCONTINUED | OUTPATIENT
Start: 2023-11-20 | End: 2023-11-24 | Stop reason: HOSPADM

## 2023-11-20 RX ORDER — POTASSIUM CHLORIDE 20 MEQ/1
40 TABLET, EXTENDED RELEASE ORAL PRN
Status: DISCONTINUED | OUTPATIENT
Start: 2023-11-20 | End: 2023-11-24 | Stop reason: HOSPADM

## 2023-11-20 RX ORDER — IBUPROFEN 600 MG/1
1 TABLET ORAL PRN
Status: DISCONTINUED | OUTPATIENT
Start: 2023-11-20 | End: 2023-11-24 | Stop reason: HOSPADM

## 2023-11-20 RX ORDER — FAMOTIDINE 20 MG/1
10 TABLET, FILM COATED ORAL DAILY PRN
Status: DISCONTINUED | OUTPATIENT
Start: 2023-11-20 | End: 2023-11-24 | Stop reason: HOSPADM

## 2023-11-20 RX ORDER — POTASSIUM CHLORIDE 7.45 MG/ML
10 INJECTION INTRAVENOUS PRN
Status: DISCONTINUED | OUTPATIENT
Start: 2023-11-20 | End: 2023-11-24 | Stop reason: HOSPADM

## 2023-11-20 RX ORDER — POLYETHYLENE GLYCOL 3350 17 G/17G
17 POWDER, FOR SOLUTION ORAL DAILY PRN
Status: DISCONTINUED | OUTPATIENT
Start: 2023-11-20 | End: 2023-11-24 | Stop reason: HOSPADM

## 2023-11-20 RX ORDER — LEFLUNOMIDE 20 MG/1
20 TABLET ORAL DAILY
Status: DISCONTINUED | OUTPATIENT
Start: 2023-11-21 | End: 2023-11-20

## 2023-11-20 RX ORDER — ALENDRONATE SODIUM 10 MG/1
70 TABLET ORAL WEEKLY
Status: DISCONTINUED | OUTPATIENT
Start: 2023-11-20 | End: 2023-11-20

## 2023-11-20 RX ORDER — SODIUM CHLORIDE 0.9 % (FLUSH) 0.9 %
5-40 SYRINGE (ML) INJECTION PRN
Status: DISCONTINUED | OUTPATIENT
Start: 2023-11-20 | End: 2023-11-24 | Stop reason: HOSPADM

## 2023-11-20 RX ADMIN — SODIUM CHLORIDE, PRESERVATIVE FREE 10 ML: 5 INJECTION INTRAVENOUS at 21:37

## 2023-11-20 RX ADMIN — SODIUM CHLORIDE 2859 ML: 9 INJECTION, SOLUTION INTRAVENOUS at 17:06

## 2023-11-20 RX ADMIN — TAMSULOSIN HYDROCHLORIDE 0.4 MG: 0.4 CAPSULE ORAL at 19:33

## 2023-11-20 RX ADMIN — PIPERACILLIN AND TAZOBACTAM 4500 MG: 4; .5 INJECTION, POWDER, FOR SOLUTION INTRAVENOUS at 17:06

## 2023-11-20 RX ADMIN — Medication 2500 MG: at 17:05

## 2023-11-20 RX ADMIN — ONDANSETRON 4 MG: 2 INJECTION INTRAMUSCULAR; INTRAVENOUS at 19:33

## 2023-11-20 RX ADMIN — INSULIN GLARGINE 32 UNITS: 100 INJECTION, SOLUTION SUBCUTANEOUS at 21:36

## 2023-11-20 RX ADMIN — TUBERCULIN PURIFIED PROTEIN DERIVATIVE 5 UNITS: 5 INJECTION, SOLUTION INTRADERMAL at 21:35

## 2023-11-20 RX ADMIN — MORPHINE SULFATE 1 MG: 2 INJECTION, SOLUTION INTRAMUSCULAR; INTRAVENOUS at 19:32

## 2023-11-20 RX ADMIN — PIPERACILLIN AND TAZOBACTAM 3375 MG: 3; .375 INJECTION, POWDER, LYOPHILIZED, FOR SOLUTION INTRAVENOUS at 22:31

## 2023-11-20 ASSESSMENT — ENCOUNTER SYMPTOMS
BACK PAIN: 1
VOMITING: 0
COLOR CHANGE: 1
CHEST TIGHTNESS: 0
CONSTIPATION: 0
ABDOMINAL PAIN: 0
SHORTNESS OF BREATH: 0
SHORTNESS OF BREATH: 0
STRIDOR: 0
COUGH: 0
NAUSEA: 0
WHEEZING: 0
ABDOMINAL PAIN: 0
DIARRHEA: 0
WHEEZING: 0

## 2023-11-20 ASSESSMENT — PAIN DESCRIPTION - DESCRIPTORS: DESCRIPTORS: ACHING

## 2023-11-20 ASSESSMENT — PAIN DESCRIPTION - LOCATION
LOCATION: TOE (COMMENT WHICH ONE)
LOCATION: TOE (COMMENT WHICH ONE)

## 2023-11-20 ASSESSMENT — PAIN SCALES - GENERAL
PAINLEVEL_OUTOF10: 0
PAINLEVEL_OUTOF10: 6
PAINLEVEL_OUTOF10: 5

## 2023-11-20 ASSESSMENT — PAIN DESCRIPTION - ORIENTATION
ORIENTATION: LEFT
ORIENTATION: LEFT

## 2023-11-20 ASSESSMENT — PAIN - FUNCTIONAL ASSESSMENT: PAIN_FUNCTIONAL_ASSESSMENT: 0-10

## 2023-11-20 ASSESSMENT — LIFESTYLE VARIABLES
HOW MANY STANDARD DRINKS CONTAINING ALCOHOL DO YOU HAVE ON A TYPICAL DAY: PATIENT DOES NOT DRINK
HOW OFTEN DO YOU HAVE A DRINK CONTAINING ALCOHOL: NEVER

## 2023-11-20 NOTE — ED TRIAGE NOTES
Pt arrives to ed ambulatory with family with c/o left foot great toe wound, wound there for multiple months. Hx of DM II. Pt states majority of toe is black, pt not on atibx, states clear drainage.

## 2023-11-20 NOTE — ED PROVIDER NOTES
Emergency Department Provider Note                   PCP:                Arnette Goldmann, APRN - NP               Age: 78 y.o. Sex: male       ICD-10-CM    1. Diabetic foot infection ([address])  E11.628     L08.9       2. Septicemia ([address])  A41.9           DISPOSITION Decision To Admit 11/20/2023 05:35:23 PM       MEDICAL DECISION MAKING  Complexity of Problems Addressed:  1 or more acute illnesses that pose a threat to life or bodily function. Data Reviewed and Analyzed:  Category 1:   I reviewed external records: ED visit note from an outside group. I reviewed external records: provider visit note from PCP. I reviewed external records: provider visit note from outside specialist.  I ordered each unique test.  I reviewed the results of each unique test.      Category 2:   ED EKG was independently interpreted in the absence of a cardiologist.  Rate: 93  EKG Interpretation: EKG Interpretation: sinus rhythm, no evidence of arrhythmia  ST Segments: Nonspecific ST segments - NO STEMI  I interpreted the X-rays no evidence of osteomyelitis. All medical decision making was based on the certified radiologist interpretation. .    Category 3: Discussion of management or test interpretation. 72-year-old male with past medical history of hypertension, type 2 diabetes, lumbar disc disease, and CKD presents with 2 wounds on his left foot. On presentation, patient is tachycardic at 108. He is afebrile without hypotension or any other evidence of hemodynamic instability. He has a ulcer with surrounding necrotic tissue present to both his left first and second toes. He has a 2+ palpable dorsalis pedis pulse and is neurovascularly intact in the left lower extremity. Given the appearance of the wounds, will initiate sepsis protocol including fluids, vancomycin and Zosyn.     X-ray revealed a small linear foreign body in the plantar soft tissues of the midfoot, patient does not have any tenderness or abnormalities in

## 2023-11-20 NOTE — PROGRESS NOTES
PROGRESS NOTE    SUBJECTIVE:   Mikhail Interiano is a 78 y.o. male seen for a follow up visit for   Chief Complaint   Patient presents with    Pre-op Exam     Surgical     HPI  Planing surgical intervention by Dr. Tone Dickey for:    Spinal stenosis, lumbar region, with neurogenic claudication    Toe wound  Left great toe. He reports his shoe was rubbing on his right great toe and it has become an open wound. He denies any evidence of purulent drainage. He reports he has been keeping it covered. [place] [facility] [place] he reports he told the caller his toe was looking better. Diabetes  Onset: 2002. Treatments tried: Now on Tresiba U-100 and metformin. The treatment provided moderate relief. He reports blood sugars fasting are typically 100's to 200's. He only tests his blood sugar about 3 times per week. Hypertension   Onset: 2000. Agents associated with hypertension include steroids. Risk factors include family history, diabetes mellitus, obesity, male gender, hypertension and smoking/tobacco exposure. Anemia  Pertinent negatives include no chest pain, no headaches and no shortness of breath. Rheumatoid arthritis  Onset: > 10 years ago. He has persistent joint pain and stiffness and spite of treatment. Treatments tried: Prednisone, Arava and adalimumab.   He has been on Humira in the past.           Past Medical History:   Diagnosis Date    Anemia, unspecified     Arthritis     RA; spine/ general joints    Diabetes ([address]) dx 1995    type 2; checks BS in pm; denies hypo S/S; A1C in Nov '12- 7.8; fovvb=673- 2 hrs pc (11/28/12)    Hypertension     well controlled with meds    Iron deficiency anemia secondary to inadequate dietary iron intake 3/6/2015    Open wound of lower extremity 3/11/2020    Rash and other nonspecific skin eruption     Rheumatoid arthritis(714.0) 3/6/2015    Statin intolerance 1/6/2017    Type II diabetes mellitus, uncontrolled 8/17/2015    Type II or

## 2023-11-21 ENCOUNTER — APPOINTMENT (OUTPATIENT)
Dept: ULTRASOUND IMAGING | Age: 78
End: 2023-11-21
Payer: MEDICARE

## 2023-11-21 PROBLEM — N17.9 ACUTE RENAL FAILURE SUPERIMPOSED ON STAGE 3 CHRONIC KIDNEY DISEASE (HCC): Status: ACTIVE | Noted: 2023-11-21

## 2023-11-21 PROBLEM — N18.30 ACUTE RENAL FAILURE SUPERIMPOSED ON STAGE 3 CHRONIC KIDNEY DISEASE (HCC): Status: ACTIVE | Noted: 2023-11-21

## 2023-11-21 LAB
ALBUMIN SERPL-MCNC: 3.3 G/DL (ref 3.2–4.6)
ALBUMIN/GLOB SERPL: 1 (ref 0.4–1.6)
ALP SERPL-CCNC: 55 U/L (ref 50–136)
ALT SERPL-CCNC: 18 U/L (ref 12–65)
ANION GAP SERPL CALC-SCNC: 7 MMOL/L (ref 2–11)
AST SERPL-CCNC: 10 U/L (ref 15–37)
BASOPHILS # BLD: 0.1 K/UL (ref 0–0.2)
BASOPHILS NFR BLD: 1 % (ref 0–2)
BILIRUB DIRECT SERPL-MCNC: <0.1 MG/DL
BILIRUB SERPL-MCNC: 0.3 MG/DL (ref 0.2–1.1)
BUN SERPL-MCNC: 26 MG/DL (ref 8–23)
CALCIUM SERPL-MCNC: 9.4 MG/DL (ref 8.3–10.4)
CHLORIDE SERPL-SCNC: 107 MMOL/L (ref 101–110)
CO2 SERPL-SCNC: 29 MMOL/L (ref 21–32)
CREAT SERPL-MCNC: 1.4 MG/DL (ref 0.8–1.5)
DIFFERENTIAL METHOD BLD: ABNORMAL
EKG ATRIAL RATE: 93 BPM
EKG DIAGNOSIS: NORMAL
EKG P AXIS: 66 DEGREES
EKG P-R INTERVAL: 156 MS
EKG Q-T INTERVAL: 324 MS
EKG QRS DURATION: 78 MS
EKG QTC CALCULATION (BAZETT): 402 MS
EKG R AXIS: 30 DEGREES
EKG T AXIS: 57 DEGREES
EKG VENTRICULAR RATE: 93 BPM
EOSINOPHIL # BLD: 0.1 K/UL (ref 0–0.8)
EOSINOPHIL NFR BLD: 1 % (ref 0.5–7.8)
ERYTHROCYTE [DISTWIDTH] IN BLOOD BY AUTOMATED COUNT: 14.7 % (ref 11.9–14.6)
EST. AVERAGE GLUCOSE BLD GHB EST-MCNC: 146 MG/DL
GLOBULIN SER CALC-MCNC: 3.3 G/DL (ref 2.8–4.5)
GLUCOSE BLD STRIP.AUTO-MCNC: 105 MG/DL (ref 65–100)
GLUCOSE BLD STRIP.AUTO-MCNC: 168 MG/DL (ref 65–100)
GLUCOSE BLD STRIP.AUTO-MCNC: 208 MG/DL (ref 65–100)
GLUCOSE BLD STRIP.AUTO-MCNC: 73 MG/DL (ref 65–100)
GLUCOSE BLD STRIP.AUTO-MCNC: 97 MG/DL (ref 65–100)
GLUCOSE SERPL-MCNC: 113 MG/DL (ref 65–100)
HBA1C MFR BLD: 6.7 % (ref 4.8–5.6)
HCT VFR BLD AUTO: 31.3 % (ref 41.1–50.3)
HGB BLD-MCNC: 9.6 G/DL (ref 13.6–17.2)
IMM GRANULOCYTES # BLD AUTO: 0 K/UL (ref 0–0.5)
IMM GRANULOCYTES NFR BLD AUTO: 0 % (ref 0–5)
LACTATE SERPL-SCNC: 1.5 MMOL/L (ref 0.4–2)
LACTATE SERPL-SCNC: 3 MMOL/L (ref 0.4–2)
LACTATE SERPL-SCNC: 3.4 MMOL/L (ref 0.4–2)
LYMPHOCYTES # BLD: 1.8 K/UL (ref 0.5–4.6)
LYMPHOCYTES NFR BLD: 16 % (ref 13–44)
MCH RBC QN AUTO: 29.7 PG (ref 26.1–32.9)
MCHC RBC AUTO-ENTMCNC: 30.7 G/DL (ref 31.4–35)
MCV RBC AUTO: 96.9 FL (ref 82–102)
MM INDURATION, POC: 0 MM (ref 0–5)
MONOCYTES # BLD: 0.7 K/UL (ref 0.1–1.3)
MONOCYTES NFR BLD: 7 % (ref 4–12)
NEUTS SEG # BLD: 8.2 K/UL (ref 1.7–8.2)
NEUTS SEG NFR BLD: 75 % (ref 43–78)
NRBC # BLD: 0 K/UL (ref 0–0.2)
PLATELET # BLD AUTO: 283 K/UL (ref 150–450)
PMV BLD AUTO: 9.2 FL (ref 9.4–12.3)
POTASSIUM SERPL-SCNC: 4.1 MMOL/L (ref 3.5–5.1)
PPD, POC: NEGATIVE
PROT SERPL-MCNC: 6.6 G/DL (ref 6.3–8.2)
RBC # BLD AUTO: 3.23 M/UL (ref 4.23–5.6)
SERVICE CMNT-IMP: ABNORMAL
SERVICE CMNT-IMP: NORMAL
SERVICE CMNT-IMP: NORMAL
SODIUM SERPL-SCNC: 143 MMOL/L (ref 133–143)
WBC # BLD AUTO: 10.9 K/UL (ref 4.3–11.1)

## 2023-11-21 PROCEDURE — 82962 GLUCOSE BLOOD TEST: CPT

## 2023-11-21 PROCEDURE — 93010 ELECTROCARDIOGRAM REPORT: CPT | Performed by: INTERNAL MEDICINE

## 2023-11-21 PROCEDURE — 2580000003 HC RX 258: Performed by: FAMILY MEDICINE

## 2023-11-21 PROCEDURE — 80076 HEPATIC FUNCTION PANEL: CPT

## 2023-11-21 PROCEDURE — 36415 COLL VENOUS BLD VENIPUNCTURE: CPT

## 2023-11-21 PROCEDURE — 83036 HEMOGLOBIN GLYCOSYLATED A1C: CPT

## 2023-11-21 PROCEDURE — 1100000003 HC PRIVATE W/ TELEMETRY

## 2023-11-21 PROCEDURE — 97162 PT EVAL MOD COMPLEX 30 MIN: CPT

## 2023-11-21 PROCEDURE — 6360000002 HC RX W HCPCS: Performed by: FAMILY MEDICINE

## 2023-11-21 PROCEDURE — 6370000000 HC RX 637 (ALT 250 FOR IP): Performed by: FAMILY MEDICINE

## 2023-11-21 PROCEDURE — 85025 COMPLETE CBC W/AUTO DIFF WBC: CPT

## 2023-11-21 PROCEDURE — 83605 ASSAY OF LACTIC ACID: CPT

## 2023-11-21 PROCEDURE — 93925 LOWER EXTREMITY STUDY: CPT

## 2023-11-21 PROCEDURE — 97165 OT EVAL LOW COMPLEX 30 MIN: CPT

## 2023-11-21 PROCEDURE — 97530 THERAPEUTIC ACTIVITIES: CPT

## 2023-11-21 PROCEDURE — 80048 BASIC METABOLIC PNL TOTAL CA: CPT

## 2023-11-21 PROCEDURE — 97535 SELF CARE MNGMENT TRAINING: CPT

## 2023-11-21 RX ORDER — LACTOBACILLUS RHAMNOSUS GG 10B CELL
1 CAPSULE ORAL
Status: DISCONTINUED | OUTPATIENT
Start: 2023-11-22 | End: 2023-11-24 | Stop reason: HOSPADM

## 2023-11-21 RX ADMIN — PIPERACILLIN AND TAZOBACTAM 3375 MG: 3; .375 INJECTION, POWDER, LYOPHILIZED, FOR SOLUTION INTRAVENOUS at 21:36

## 2023-11-21 RX ADMIN — SODIUM CHLORIDE, PRESERVATIVE FREE 10 ML: 5 INJECTION INTRAVENOUS at 21:37

## 2023-11-21 RX ADMIN — VERAPAMIL HYDROCHLORIDE 180 MG: 180 TABLET, FILM COATED, EXTENDED RELEASE ORAL at 08:10

## 2023-11-21 RX ADMIN — MORPHINE SULFATE 1 MG: 2 INJECTION, SOLUTION INTRAMUSCULAR; INTRAVENOUS at 03:09

## 2023-11-21 RX ADMIN — PIPERACILLIN AND TAZOBACTAM 3375 MG: 3; .375 INJECTION, POWDER, LYOPHILIZED, FOR SOLUTION INTRAVENOUS at 06:35

## 2023-11-21 RX ADMIN — INSULIN GLARGINE 32 UNITS: 100 INJECTION, SOLUTION SUBCUTANEOUS at 21:36

## 2023-11-21 RX ADMIN — SODIUM CHLORIDE, PRESERVATIVE FREE 10 ML: 5 INJECTION INTRAVENOUS at 08:10

## 2023-11-21 RX ADMIN — LOSARTAN POTASSIUM 100 MG: 50 TABLET, FILM COATED ORAL at 08:10

## 2023-11-21 RX ADMIN — PIPERACILLIN AND TAZOBACTAM 3375 MG: 3; .375 INJECTION, POWDER, LYOPHILIZED, FOR SOLUTION INTRAVENOUS at 15:36

## 2023-11-21 RX ADMIN — PREDNISONE 5 MG: 10 TABLET ORAL at 08:10

## 2023-11-21 RX ADMIN — HYDROCHLOROTHIAZIDE 25 MG: 25 TABLET ORAL at 08:10

## 2023-11-21 RX ADMIN — VANCOMYCIN HYDROCHLORIDE 1250 MG: 10 INJECTION, POWDER, LYOPHILIZED, FOR SOLUTION INTRAVENOUS at 16:56

## 2023-11-21 RX ADMIN — TAMSULOSIN HYDROCHLORIDE 0.4 MG: 0.4 CAPSULE ORAL at 08:10

## 2023-11-21 ASSESSMENT — PAIN DESCRIPTION - DESCRIPTORS: DESCRIPTORS: DISCOMFORT;DULL;GNAWING

## 2023-11-21 ASSESSMENT — PAIN SCALES - GENERAL
PAINLEVEL_OUTOF10: 0
PAINLEVEL_OUTOF10: 7
PAINLEVEL_OUTOF10: 0
PAINLEVEL_OUTOF10: 0

## 2023-11-21 ASSESSMENT — PAIN DESCRIPTION - ONSET: ONSET: GRADUAL

## 2023-11-21 ASSESSMENT — PAIN - FUNCTIONAL ASSESSMENT: PAIN_FUNCTIONAL_ASSESSMENT: PREVENTS OR INTERFERES SOME ACTIVE ACTIVITIES AND ADLS

## 2023-11-21 ASSESSMENT — PAIN DESCRIPTION - ORIENTATION: ORIENTATION: RIGHT;LEFT

## 2023-11-21 ASSESSMENT — PAIN DESCRIPTION - FREQUENCY: FREQUENCY: INTERMITTENT

## 2023-11-21 ASSESSMENT — PAIN DESCRIPTION - LOCATION: LOCATION: GENERALIZED

## 2023-11-21 ASSESSMENT — PAIN DESCRIPTION - PAIN TYPE: TYPE: CHRONIC PAIN

## 2023-11-21 NOTE — PROGRESS NOTES
[] [] [] [] [] [] [x]    Upper Body Dressing [] [] [] [] [x] [] [] [] [] [] In sitting    Lower Body Dressing [] [] [] [] [x] [] [] [] [] [] In sitting and standing    Feeding [] [] [] [] [] [] [] [] [] [x]    Grooming [] [] [] [] [x] [] [] [] [] [] Washed face standing at the sink   Personal Device Care [] [] [] [] [] [] [] [] [] [x]    Functional Mobility [] [] [] [] [x] [] [] [] [] [] No AD   I=Independent, Mod I=Modified Independent, S=Supervision/Setup, SBA=Standby Assistance, CGA=Contact Guard Assistance, Min=Minimal Assistance, Mod=Moderate Assistance, Max=Maximal Assistance, Total=Total Assistance, NT=Not Tested    PLAN:   FREQUENCY/DURATION   OT Plan of Care: 3 times/week for duration of hospital stay or until stated goals are met, whichever comes first.    PROBLEM LIST:   (Skilled intervention is medically necessary to address:)  Decreased ADL/Functional Activities  Decreased Activity Tolerance  Decreased Balance  Decreased Safety Awareness  Decreased Strength  Decreased Transfer Abilities   INTERVENTIONS PLANNED:  (Benefits and precautions of occupational therapy have been discussed with the patient.)  Self Care Training  Therapeutic Activity  Therapeutic Exercise/HEP  Neuromuscular Re-education  Manual Therapy  Education         TREATMENT:     EVALUATION: LOW COMPLEXITY: (Untimed Charge)    TREATMENT:   Co-Treatment PT/OT necessary due to patient's decreased overall endurance/tolerance levels, as well as need for high level skilled assistance to complete functional transfers/mobility and functional tasks  Self Care (23 minutes): Patient participated in upper body bathing, lower body bathing, upper body dressing, lower body dressing, and grooming ADLs in unsupported sitting and standing with minimal verbal cueing to increase independence, increase activity tolerance, and increase safety awareness.  Patient also participated in functional mobility and functional transfer training to increase independence, decrease assistance required, increase activity tolerance, and increase safety awareness. TREATMENT GRID:  N/A    AFTER TREATMENT PRECAUTIONS: Call light within reach, Chair, Needs within reach, and RN notified    INTERDISCIPLINARY COLLABORATION:  RN/ PCT, PT/ PTA, and OT/ MORALES    EDUCATION:  Education Given To: Patient  Education Provided: Role of Therapy;Plan of Care; Fall Prevention Strategies  Education Outcome: Verbalized understanding;Demonstrated understanding    TOTAL TREATMENT DURATION AND TIME:  Time In: 5930  Time Out: [address]  Minutes: [address] [facility], OT

## 2023-11-21 NOTE — PLAN OF CARE
Problem: Pain  Goal: Verbalizes/displays adequate comfort level or baseline comfort level  Outcome: Progressing     Problem: ABCDS Injury Assessment  Goal: Absence of physical injury  Outcome: Progressing     Problem: Safety - Adult  Goal: Free from fall injury  Outcome: Progressing     Problem: Chronic Conditions and Co-morbidities  Goal: Patient's chronic conditions and co-morbidity symptoms are monitored and maintained or improved  Outcome: Progressing     Problem: Skin/Tissue Integrity  Goal: Absence of new skin breakdown  Description: 1. Monitor for areas of redness and/or skin breakdown  2. Assess vascular access sites hourly  3. Every 4-6 hours minimum:  Change oxygen saturation probe site  4. Every 4-6 hours:  If on nasal continuous positive airway pressure, respiratory therapy assess nares and determine need for appliance change or resting period.   Outcome: Progressing     Problem: Skin/Tissue Integrity - Adult  Goal: Incisions, wounds, or drain sites healing without S/S of infection  Outcome: Progressing     Problem: Infection - Adult  Goal: Absence of infection during hospitalization  Outcome: Progressing

## 2023-11-21 NOTE — PROGRESS NOTES
1702-  and patient stated \"I've already ate my dinner, it will drop, I don't need that shot\". Patient refused scheduled Humalog per MD order.

## 2023-11-21 NOTE — PROGRESS NOTES
End of shift: 7p-7a    VSS, elevated BP, afebrile. No insulin coverage given. PRN Morphine given x 1. Wound culture done per MD order. BSSR given to oncoming RN. Patient voiced no needs or concerns at this time.     Mynor Vargas, RN

## 2023-11-21 NOTE — PLAN OF CARE
Problem: Pain  Goal: Verbalizes/displays adequate comfort level or baseline comfort level  11/21/2023 0656 by Michelle Mendoza RN  Outcome: Progressing  11/21/2023 0113 by Ty Salcido RN  Outcome: Progressing     Problem: ABCDS Injury Assessment  Goal: Absence of physical injury  11/21/2023 0656 by Michelle Mendoza RN  Outcome: Progressing  11/21/2023 0113 by Ty Salcido RN  Outcome: Progressing     Problem: Safety - Adult  Goal: Free from fall injury  11/21/2023 0656 by Michelle Mendoza RN  Outcome: Progressing  11/21/2023 0113 by Ty Salcido RN  Outcome: Progressing     Problem: Chronic Conditions and Co-morbidities  Goal: Patient's chronic conditions and co-morbidity symptoms are monitored and maintained or improved  11/21/2023 0656 by Michelle Mendoza RN  Outcome: Progressing  11/21/2023 0113 by Ty Salcido RN  Outcome: Progressing     Problem: Skin/Tissue Integrity  Goal: Absence of new skin breakdown  Description: 1. Monitor for areas of redness and/or skin breakdown  2. Assess vascular access sites hourly  3. Every 4-6 hours minimum:  Change oxygen saturation probe site  4. Every 4-6 hours:  If on nasal continuous positive airway pressure, respiratory therapy assess nares and determine need for appliance change or resting period.   11/21/2023 0656 by Michelle Mendoza RN  Outcome: Progressing  11/21/2023 0113 by Ty Salcido RN  Outcome: Progressing

## 2023-11-21 NOTE — PROGRESS NOTES
ACUTE PHYSICAL THERAPY GOALS:   (Developed with and agreed upon by patient and/or caregiver.)   Mr. Bob Holloway will perform all transfers independently in 7 days. Mr. Bob Holloway will ambulate with appropriate assistive device 300 ft while maintaining appropriate WB status in 7 days. PHYSICAL THERAPY Initial Assessment, Daily Note, and AM  (Link to Caseload Tracking:    Acknowledge Orders  Time In/Out  PT Charge Capture  Db Gamino is a 78 y.o. male   PRIMARY DIAGNOSIS: Diabetic ulcer of left foot due to type 2 diabetes mellitus ([address])  Septicemia ([address]) [A41.9]  Diabetic foot infection ([address]) [R63.013, L08.9]  Diabetic ulcer of left foot due to type 2 diabetes mellitus ([address]) [W75.453, L97.529]       Reason for Referral: Generalized Muscle Weakness (M62.81)  Difficulty in walking, Not elsewhere classified (R26.2)  Repeated Falls (R29.6)  History of falling (Z91.81)  Inpatient: Payor: Yareli Lester / Plan: Sarah Baird PPO / Product Type: Medicare /     ASSESSMENT:     REHAB RECOMMENDATIONS:   Recommendation to date pending progress:  Setting:  Depends on with ortho decides to do. Equipment:    To Be Determined  He has a rollator     ASSESSMENT:  Mr. Bob Holloway is a nice man who is rather set in his ways. Lives with his wife and cares for his great grand children,  He was a large  now likes to reinaldo on motor cycles. He presents up in the chair still in his clothes and barefoot. His left great toe is red with dry open areas. He says it doesn't hurt at all and actually says it looks better than it did. He states he is a diabetic but does not have neuropathy. Cant believe he as no pain. He has been walking all in the room un assisted and states he has been told nothing regarding any weight bearing status. Today he performed dynamic activity in sitting and standing, he has a gait where he doesn't really put weight on his toes anyway.   Externally rotated

## 2023-11-21 NOTE — ED NOTES
TRANSFER - OUT REPORT:    Verbal report given to Ethan Cardoso on Cornelius Mahoney  being transferred to  [phone] for routine progression of patient care       Report consisted of patient's Situation, Background, Assessment and   Recommendations(SBAR). Information from the following report(s) Nurse Handoff Report was reviewed with the receiving nurse. [place] Fall Assessment:    Presents to emergency department  because of falls (Syncope, seizure, or loss of consciousness): No  Age > 70: Yes  Altered Mental Status, Intoxication with alcohol or substance confusion (Disorientation, impaired judgment, poor safety awaremess, or inability to follow instructions): No  Impaired Mobility: Ambulates or transfers with assistive devices or assistance; Unable to ambulate or transer.: Yes  Nursing Judgement: Yes          Lines:   Peripheral IV 11/20/23 Left Antecubital (Active)   Site Assessment Clean, dry & intact 11/20/23 1623   Line Status Blood return noted; Flushed 11/20/23 1623   Phlebitis Assessment No symptoms 11/20/23 1623   Infiltration Assessment 0 11/20/23 1623   Alcohol Cap Used No 11/20/23 1623   Dressing Status New dressing applied 11/20/23 1623   Dressing Type Transparent 11/20/23 1623   Dressing Intervention New 11/20/23 1623       Peripheral IV 11/20/23 Right Antecubital (Active)        Opportunity for questions and clarification was provided.       Patient transported with:  Marco Florian RN  11/20/23 1956

## 2023-11-21 NOTE — ACP (ADVANCE CARE PLANNING)
[facility] Hospitalist Service  At the heart of better care     Advance Care Planning   Admit Date:  2023  3:48 PM   Name:  Brenda Sawyer   Age:  78 y.o. Sex:  male  :  1945   MRN:  [id number]   Room:  ER30/    Brenda Sawyer has capacity to make his own decisions:   Yes    Patient / surrogate decision-maker directed code status:  Full Code    Patient or surrogate consented to discussion of the current conditions, workup, management plans, prognosis, and the risk for further deterioration. Time spent: 17 minutes in direct discussion.       Signed:  Real Jeronimo DO

## 2023-11-21 NOTE — ED NOTES
BS checked, patient 63. Patient provided with 4oz juice, as per protocol. Will re-check in 15 minutes.       Jose Chavira RN  11/20/23 [id number]

## 2023-11-21 NOTE — WOUND CARE
Patient seen briefly for left foot wounds. Noted orthopedic surgery also consulted for this foot. Patient stated wounds have been present for months. Has opening on great toe with yellow slough material. Foot warm and some areas of necrosis. Patient stated he is aware he is at risk for toe amputation but continues to walk barefoot with open wounds. Encouraged to wear socks/shoes when ambulating. Recommend painting with betadine but will wait until orthopedic team evaluates and sets up treatment plan. Wound team will follow if needed. Patient aware and verbalized understanding.

## 2023-11-22 LAB
ANION GAP SERPL CALC-SCNC: 6 MMOL/L (ref 2–11)
BASOPHILS # BLD: 0.1 K/UL (ref 0–0.2)
BASOPHILS NFR BLD: 1 % (ref 0–2)
BUN SERPL-MCNC: 18 MG/DL (ref 8–23)
CALCIUM SERPL-MCNC: 8.9 MG/DL (ref 8.3–10.4)
CHLORIDE SERPL-SCNC: 106 MMOL/L (ref 101–110)
CO2 SERPL-SCNC: 30 MMOL/L (ref 21–32)
CREAT SERPL-MCNC: 1.3 MG/DL (ref 0.8–1.5)
DIFFERENTIAL METHOD BLD: ABNORMAL
EOSINOPHIL # BLD: 0.1 K/UL (ref 0–0.8)
EOSINOPHIL NFR BLD: 1 % (ref 0.5–7.8)
ERYTHROCYTE [DISTWIDTH] IN BLOOD BY AUTOMATED COUNT: 14.8 % (ref 11.9–14.6)
GLUCOSE BLD STRIP.AUTO-MCNC: 160 MG/DL (ref 65–100)
GLUCOSE BLD STRIP.AUTO-MCNC: 166 MG/DL (ref 65–100)
GLUCOSE BLD STRIP.AUTO-MCNC: 200 MG/DL (ref 65–100)
GLUCOSE BLD STRIP.AUTO-MCNC: 70 MG/DL (ref 65–100)
GLUCOSE SERPL-MCNC: 90 MG/DL (ref 65–100)
HCT VFR BLD AUTO: 31 % (ref 41.1–50.3)
HGB BLD-MCNC: 9.6 G/DL (ref 13.6–17.2)
IMM GRANULOCYTES # BLD AUTO: 0 K/UL (ref 0–0.5)
IMM GRANULOCYTES NFR BLD AUTO: 0 % (ref 0–5)
LYMPHOCYTES # BLD: 1.7 K/UL (ref 0.5–4.6)
LYMPHOCYTES NFR BLD: 18 % (ref 13–44)
MCH RBC QN AUTO: 29.8 PG (ref 26.1–32.9)
MCHC RBC AUTO-ENTMCNC: 31 G/DL (ref 31.4–35)
MCV RBC AUTO: 96.3 FL (ref 82–102)
MONOCYTES # BLD: 0.8 K/UL (ref 0.1–1.3)
MONOCYTES NFR BLD: 8 % (ref 4–12)
NEUTS SEG # BLD: 6.7 K/UL (ref 1.7–8.2)
NEUTS SEG NFR BLD: 72 % (ref 43–78)
NRBC # BLD: 0 K/UL (ref 0–0.2)
PLATELET # BLD AUTO: 322 K/UL (ref 150–450)
PMV BLD AUTO: 9.9 FL (ref 9.4–12.3)
POTASSIUM SERPL-SCNC: 4 MMOL/L (ref 3.5–5.1)
RBC # BLD AUTO: 3.22 M/UL (ref 4.23–5.6)
SERVICE CMNT-IMP: ABNORMAL
SERVICE CMNT-IMP: NORMAL
SODIUM SERPL-SCNC: 142 MMOL/L (ref 133–143)
VANCOMYCIN SERPL-MCNC: 17.9 UG/ML
WBC # BLD AUTO: 9.4 K/UL (ref 4.3–11.1)

## 2023-11-22 PROCEDURE — 36415 COLL VENOUS BLD VENIPUNCTURE: CPT

## 2023-11-22 PROCEDURE — 6370000000 HC RX 637 (ALT 250 FOR IP): Performed by: FAMILY MEDICINE

## 2023-11-22 PROCEDURE — 1100000003 HC PRIVATE W/ TELEMETRY

## 2023-11-22 PROCEDURE — 2580000003 HC RX 258: Performed by: FAMILY MEDICINE

## 2023-11-22 PROCEDURE — 6360000002 HC RX W HCPCS: Performed by: FAMILY MEDICINE

## 2023-11-22 PROCEDURE — 80202 ASSAY OF VANCOMYCIN: CPT

## 2023-11-22 PROCEDURE — 87641 MR-STAPH DNA AMP PROBE: CPT

## 2023-11-22 PROCEDURE — 85025 COMPLETE CBC W/AUTO DIFF WBC: CPT

## 2023-11-22 PROCEDURE — 80048 BASIC METABOLIC PNL TOTAL CA: CPT

## 2023-11-22 PROCEDURE — 82962 GLUCOSE BLOOD TEST: CPT

## 2023-11-22 RX ORDER — INSULIN GLARGINE 100 [IU]/ML
27 INJECTION, SOLUTION SUBCUTANEOUS NIGHTLY
Status: DISCONTINUED | OUTPATIENT
Start: 2023-11-22 | End: 2023-11-24 | Stop reason: HOSPADM

## 2023-11-22 RX ADMIN — LOSARTAN POTASSIUM 100 MG: 50 TABLET, FILM COATED ORAL at 07:54

## 2023-11-22 RX ADMIN — TRAMADOL HYDROCHLORIDE 50 MG: 50 TABLET ORAL at 21:58

## 2023-11-22 RX ADMIN — INSULIN GLARGINE 27 UNITS: 100 INJECTION, SOLUTION SUBCUTANEOUS at 21:58

## 2023-11-22 RX ADMIN — PREDNISONE 5 MG: 10 TABLET ORAL at 07:54

## 2023-11-22 RX ADMIN — PIPERACILLIN AND TAZOBACTAM 3375 MG: 3; .375 INJECTION, POWDER, LYOPHILIZED, FOR SOLUTION INTRAVENOUS at 14:44

## 2023-11-22 RX ADMIN — SODIUM CHLORIDE, PRESERVATIVE FREE 10 ML: 5 INJECTION INTRAVENOUS at 22:03

## 2023-11-22 RX ADMIN — INSULIN LISPRO 2 UNITS: 100 INJECTION, SOLUTION INTRAVENOUS; SUBCUTANEOUS at 16:24

## 2023-11-22 RX ADMIN — Medication 1 CAPSULE: at 07:54

## 2023-11-22 RX ADMIN — PIPERACILLIN AND TAZOBACTAM 3375 MG: 3; .375 INJECTION, POWDER, LYOPHILIZED, FOR SOLUTION INTRAVENOUS at 06:25

## 2023-11-22 RX ADMIN — PIPERACILLIN AND TAZOBACTAM 3375 MG: 3; .375 INJECTION, POWDER, LYOPHILIZED, FOR SOLUTION INTRAVENOUS at 22:02

## 2023-11-22 RX ADMIN — TAMSULOSIN HYDROCHLORIDE 0.4 MG: 0.4 CAPSULE ORAL at 07:54

## 2023-11-22 RX ADMIN — VERAPAMIL HYDROCHLORIDE 180 MG: 180 TABLET, FILM COATED, EXTENDED RELEASE ORAL at 07:53

## 2023-11-22 RX ADMIN — VANCOMYCIN HYDROCHLORIDE 1250 MG: 10 INJECTION, POWDER, LYOPHILIZED, FOR SOLUTION INTRAVENOUS at 17:20

## 2023-11-22 ASSESSMENT — PAIN DESCRIPTION - DESCRIPTORS: DESCRIPTORS: ACHING

## 2023-11-22 ASSESSMENT — PAIN DESCRIPTION - LOCATION: LOCATION: GENERALIZED;FOOT

## 2023-11-22 ASSESSMENT — PAIN SCALES - GENERAL: PAINLEVEL_OUTOF10: 6

## 2023-11-22 ASSESSMENT — PAIN DESCRIPTION - ORIENTATION: ORIENTATION: LEFT

## 2023-11-22 NOTE — PROGRESS NOTES
Patient cleared by vascular to eat. Verified with on call hospitalist and ok to place patient back on previous diet.

## 2023-11-22 NOTE — PROGRESS NOTES
Vascular Surgery Progress Note  Consult for left DM toe wounds. Reduced ABIs. Toe pressure 43mmHg. Wound has adequate wound healing potential to heal local wound care. Currently with abx, betadine painting to left 1st and 4th toes. Continue local wound care and f/u in clinic in 2 weeks.

## 2023-11-22 NOTE — WOUND CARE
Pt seen for follow up on left foot wounds . Recommend to continue painting with betadine daily. Wound team will continue to follow while in acute care setting.

## 2023-11-22 NOTE — DIABETES MGMT
Patient admitted with diabetic ulcer of left foot due to type 2 diabetes. Admitting blood glucose 111. HbA1c 6.7% likely skewed due to hemoglobin levels. Blood glucose ranged  yesterday with patient receiving Lantus 32 units and Prednisone 5mg. Blood glucose this morning was 70. Creatinine 1.30. GFR 57. Reviewed patient current regimen: Lantus 32 units nightly, Humalog correctional insulin, and Prednisone 5mg daily. Patient would likely benefit from a reduction in basal insulin to reduce risk of morning hypoglycemia. Provider updated via [employer] regarding recommendations and patient glycemic control. New orders received to change Lantus to 27 units nightly.

## 2023-11-22 NOTE — PROGRESS NOTES
Physician Progress Note      PATIENT:               Bhaskar Mcfarland  CSN #:                  [id number]  :                       1945  ADMIT DATE:       2023 3:48 PM  DISCH DATE:  RESPONDING  PROVIDER #:        Clark Valente DO          QUERY TEXT:    Patient admitted with infected left great toe diabetic foot infection. Noted   documentation of sepsis in PN on 2023 per Dr Henok Schaefer. In order to support   the diagnosis of sepsis, please include additional clinical indicators in your   documentation. Or please document if the diagnosis of sepsis has been ruled   out after further study    The medical record reflects the following:  Risk Factors: diabetic foot ulcer, infected left great toe  Clinical Indicators: 2023, PN, Dr. Clark Valente, DO:  \"Patient was admitted   with diabetic foot ulcers with SULTANA on CKD stage III. He was started on IV   fluid and empirically started on Vanco/Zosyn. Wound cultures grew GPC. \"  WBC:  10.9  temperature:  98.7 --> 99.7  Treatment: Vancomycin, Zosyn    Thank you,  MARITA Hills RN, [email or web address]  Options provided:  -- Sepsis present as evidenced by, Please document evidence. -- Sepsis was ruled out after study  -- Other - I will add my own diagnosis  -- Disagree - Not applicable / Not valid  -- Disagree - Clinically unable to determine / Unknown  -- Refer to Clinical Documentation Reviewer    PROVIDER RESPONSE TEXT:    Sepsis was ruled out after study. Query created by: Jae Loya on 2023 6:09 PM      Electronically signed by:   Keiry Abel DO 2023 7:53 AM

## 2023-11-22 NOTE — CARE COORDINATION
ASSESSMENT NOTE    Attending Physician: Luz Maria Tiwari, DO  Admit Problem: Septicemia [facility]) [A41.9]  Diabetic foot infection ([address]) [E11.628, L08.9]  Diabetic ulcer of left foot due to type 2 diabetes mellitus ([address]) [U70.954, L97.529]  Date/Time of Admission: 11/20/2023  3:48 PM  Problem List:  Patient Active Problem List   Diagnosis    Encounter for therapeutic drug level monitoring    Fatigue    Uncontrolled type 2 diabetes mellitus with hyperglycemia, with long-term current use of insulin (HCC)    Decreased dexterity    Chronically elevated hemidiaphragm    Statin intolerance    HNP (herniated nucleus pulposus) with myelopathy, cervical    Vitamin D deficiency    Iron deficiency anemia secondary to inadequate dietary iron intake    Type 2 diabetes mellitus with diabetic neuropathy (HCC)    Opioid use, unspecified with unspecified opioid-induced disorder ([address])    Hyperuricemia    Other long term (current) drug therapy    Long term current use of systemic steroids    Mixed hyperlipidemia    Other specified disorders of bone density and structure, other site    Controlled diabetes mellitus type II without complication (HCC)    Rheumatoid arthritis ([address])    Renal mass, left    Essential hypertension    Type II diabetes mellitus, uncontrolled    Polyarthralgia    Spinal stenosis of lumbar region with neurogenic claudication    Spinal stenosis, lumbar region, with neurogenic claudication    Chronic renal disease, stage III ([address]) [953318]    Diabetic ulcer of left foot due to type 2 diabetes mellitus ([address])    Acute renal failure superimposed on stage 3 chronic kidney disease ([address])       Service Assessment  Patient Orientation (P) Alert and Oriented   Cognition (P) Alert   History Provided By (P) Medical Record, Patient   Primary Caregiver (P) Self   Accompanied By/Relationship (P) n/a   Support Systems (P) Spouse/Significant Other, Family Members   Patient's Healthcare Decision Maker is: (P) Patient Declined (Legal Next of Kin Remains as Decision Maker)   PCP Verified by CM (P) Yes   Last Visit to PCP (P) Within last 3 months   Prior Functional Level (P) Independent in ADLs/IADLs   Current Functional Level (P) Assistance with the following:, Mobility   Can patient return to prior living arrangement (P) Yes   Ability to make needs known: (P) Good   Family able to assist with home care needs: (P) Yes   Would you like for me to discuss the discharge plan with any other family members/significant others, and if so, who? (P) No   Financial Resources (P) Medicare   Community Resources (P) None   CM/SW Referral (P) Other (see comment) (n/a)     Social/Functional History  Lives With (P) Spouse   Type of Home (P) [facility] (P) Ramped entrance   [address] [place], [place] [address]  - Number of Steps     Entrance Stairs - Rails     Bathroom Shower/Tub (P) Tub/Shower unit   Bathroom Toilet (P) Standard   Bathroom Equipment (P) None   Bathroom Accessibility (P) [facility] (P) None   Receives Help From (P) Family   ADL Assistance (P) Independent   Bath     Dressing     Grooming     Feeding     Toileting     Homemaking Assistance     Meal Prep     Laundry     Vacuuming     Cleaning     Gardening     Yard Work     Driving     Shopping          Other (Comment)     Homemaking Responsibilities     Meal Prep Responsibility     Laundry Responsibility     Cleaning Responsibility     Bill Paying/Finance [facility]     Other (Comment)     Ambuation Assistance (P) Independent   Transfer Assisstance (P) Independent   Active  (P) Yes   Patient's  Info     Mode of Transportation (P) Car   Education     Occupation (P) Retired   Type of Occupation       Discharge Planning   Type of Residence (P) House   Living Arrangements (P) Spouse/Significant Other   Support Systems (P) Spouse/Significant Other, Family Members   Current

## 2023-11-22 NOTE — PROGRESS NOTES
pictures)  Neuro:  CN II-XII grossly intact. Psych:  Normal mood and affect.                     I have personally reviewed labs and tests:  Recent Labs:  Recent Results (from the past 48 hour(s))   Comprehensive Metabolic Panel    Collection Time: 11/20/23  4:24 PM   Result Value Ref Range    Sodium 142 133 - 143 mmol/L    Potassium 4.4 3.5 - 5.1 mmol/L    Chloride 106 101 - 110 mmol/L    CO2 26 21 - 32 mmol/L    Anion Gap 10 2 - 11 mmol/L    Glucose 111 (H) 65 - 100 mg/dL    BUN 31 (H) 8 - 23 MG/DL    Creatinine 1.70 (H) 0.8 - 1.5 MG/DL    Est, Glom Filt Rate 42 (L) >60 ml/min/1.73m2    Calcium 9.8 8.3 - 10.4 MG/DL    Total Bilirubin 0.2 0.2 - 1.1 MG/DL    ALT 18 12 - 65 U/L    AST 12 (L) 15 - 37 U/L    Alk Phosphatase 63 50 - 136 U/L    Total Protein 7.5 6.3 - 8.2 g/dL    Albumin 3.7 3.2 - 4.6 g/dL    Globulin 3.8 2.8 - 4.5 g/dL    Albumin/Globulin Ratio 1.0 0.4 - 1.6     Blood Culture 1    Collection Time: 11/20/23  4:24 PM    Specimen: Blood   Result Value Ref Range    Special Requests LEFT  Antecubital        Culture NO GROWTH 2 DAYS     CBC with Auto Differential    Collection Time: 11/20/23  4:24 PM   Result Value Ref Range    WBC 10.9 4.3 - 11.1 K/uL    RBC 3.55 (L) 4.23 - 5.6 M/uL    Hemoglobin 10.5 (L) 13.6 - 17.2 g/dL    Hematocrit 34.8 (L) 41.1 - 50.3 %    MCV 98.0 82 - 102 FL    MCH 29.6 26.1 - 32.9 PG    MCHC 30.2 (L) 31.4 - 35.0 g/dL    RDW 14.6 11.9 - 14.6 %    Platelets 705 368 - 487 K/uL    MPV 9.5 9.4 - 12.3 FL    nRBC 0.00 0.0 - 0.2 K/uL    Differential Type AUTOMATED      Neutrophils % 89 (H) 43 - 78 %    Lymphocytes % 7 (L) 13 - 44 %    Monocytes % 4 4.0 - 12.0 %    Eosinophils % 0 (L) 0.5 - 7.8 %    Basophils % 1 0.0 - 2.0 %    Immature Granulocytes 0 0.0 - 5.0 %    Neutrophils Absolute 9.7 (H) 1.7 - 8.2 K/UL    Lymphocytes Absolute 0.7 0.5 - 4.6 K/UL    Monocytes Absolute 0.4 0.1 - 1.3 K/UL    Eosinophils Absolute 0.0 0.0 - 0.8 K/UL    Basophils Absolute 0.1 0.0 - 0.2 K/UL    Absolute acetaminophen (TYLENOL) suppository 650 mg  650 mg Rectal Q6H PRN    morphine injection 1 mg  1 mg IntraVENous Q8H PRN       Signed: Clark Valente DO    Part of this note may have been written by using a voice dictation software. The note has been proof read but may still contain some grammatical/other typographical errors.

## 2023-11-22 NOTE — DIABETES MGMT
Patient admitted with diabetic ulcer of left foot due to type 2 DM. HbA1c 6.7% likely skewed due to hemoglobin levels. Patient seen for assessment regarding diabetes management. Patient has a past medical history of type 2 DM, CKD3, chronic anemia, rheumatoid arthritis and HTN. . Patient states they have been living with diabetes for 15-20 years and voices no family history of diabetes. Patient states they do have a working glucometer with supplies at home. Per patient they typically check blood glucose levels 2-3X/week. Per patient their blood glucose levels have been running higher recently at home. Patient states they are currently taking Tresiba 40 units hs and Metformin at home for management of diabetes. Patient voices that they have experienced hypoglycemia in the past, but no in a long time. Educated regarding hypoglycemia signs, symptoms, and treatment. Patient has attended formal diabetes education in the past. Pt states that he has been to outpatient diabetes education two times. Patient reports no difficulty with affording their diabetic supplies. Explained the importance of blood glucose monitoring to patient and how this will provide their primary care provider with more information to help them safely titrate their regimen. Recommended frequency of tid and to record in log book to take to primary care provider appointment. Educated patient that all glucometers are similar but differ in small ways. Educated patient that they should try to get the glucometer covered by their insurance formulary to keep their costs down. Educated patient to seek out affordable glucometer options that exist at some stores or drug stores if they are ever uninsured. Reviewed target blood glucose goals per American Diabetes Association recommendations. Discussed the relationship between infection and hyperglycemia. Also, discussed the relationship between steroids and hyperglycemia.  Patient will need prescription for glucometer kit, test strips, and lancets at discharge so that the patient may obtain the meter covered by their insurance. Patient given educational material, \"Diabetes Self-Management: A Patient Teaching Guide\", which was reviewed with patient. Explained basic physiology of diabetes, as well as causes, signs and symptoms, and treatments for hypoglycemia and hyperglycemia. Described the effects of poor glycemic control and the development of long-term complications such as renal, eye, nerve, and cardiovascular disease. Patient denies numbness and tingling in feet. Described proper diabetic foot care and the importance of checking feet daily. Per patient they typically drink Sweet tea. Reviewed effects of sweetened beverages on glycemic control and discussed alternative beverages to help improve glycemic control. Per patient they typically eat a healthy diet and avoid sweets. Pt states that his wife does all the cooking and they eat a lot of vegetables. Educated re: effects of carbohydrates on blood glucose, the \"plate method\" of healthy meal planning, basics of healthy meal plan, and Consistent Carbohydrate Diet. Reviewed the relationship between hyperglycemia and infection and delayed healing and the importance of good blood glucose control for proper wound healing. Reviewed target goals for blood glucose and A1C. Educated patient regarding diabetic medications including mechanism of action, timing, and possible side effects. Patient verbalizes understanding of teaching. Educated patient regarding current basal/bolus regimen of Lantus 27 units hs, and Humalog correctional scale coverage 4x/day ac and hs, including type of insulin, timing with meals, onset, duration of effect, and peak of insulin dose.  Instructed patient to always seek guidance from their primary care provider about adjusting their insulin doses and not to adjust them on their own as this could negatively impact their glycemic control or result

## 2023-11-23 LAB
ANION GAP SERPL CALC-SCNC: 7 MMOL/L (ref 2–11)
BASOPHILS # BLD: 0.1 K/UL (ref 0–0.2)
BASOPHILS NFR BLD: 1 % (ref 0–2)
BUN SERPL-MCNC: 16 MG/DL (ref 8–23)
CALCIUM SERPL-MCNC: 8.7 MG/DL (ref 8.3–10.4)
CHLORIDE SERPL-SCNC: 107 MMOL/L (ref 101–110)
CO2 SERPL-SCNC: 27 MMOL/L (ref 21–32)
CREAT SERPL-MCNC: 1.4 MG/DL (ref 0.8–1.5)
DIFFERENTIAL METHOD BLD: ABNORMAL
EOSINOPHIL # BLD: 0.1 K/UL (ref 0–0.8)
EOSINOPHIL NFR BLD: 1 % (ref 0.5–7.8)
ERYTHROCYTE [DISTWIDTH] IN BLOOD BY AUTOMATED COUNT: 14.7 % (ref 11.9–14.6)
GLUCOSE BLD STRIP.AUTO-MCNC: 138 MG/DL (ref 65–100)
GLUCOSE BLD STRIP.AUTO-MCNC: 150 MG/DL (ref 65–100)
GLUCOSE BLD STRIP.AUTO-MCNC: 156 MG/DL (ref 65–100)
GLUCOSE BLD STRIP.AUTO-MCNC: 60 MG/DL (ref 65–100)
GLUCOSE SERPL-MCNC: 51 MG/DL (ref 65–100)
HCT VFR BLD AUTO: 31.1 % (ref 41.1–50.3)
HGB BLD-MCNC: 9.6 G/DL (ref 13.6–17.2)
IMM GRANULOCYTES # BLD AUTO: 0 K/UL (ref 0–0.5)
IMM GRANULOCYTES NFR BLD AUTO: 0 % (ref 0–5)
LYMPHOCYTES # BLD: 2.4 K/UL (ref 0.5–4.6)
LYMPHOCYTES NFR BLD: 27 % (ref 13–44)
MAGNESIUM SERPL-MCNC: 1.9 MG/DL (ref 1.8–2.4)
MCH RBC QN AUTO: 29.7 PG (ref 26.1–32.9)
MCHC RBC AUTO-ENTMCNC: 30.9 G/DL (ref 31.4–35)
MCV RBC AUTO: 96.3 FL (ref 82–102)
MONOCYTES # BLD: 0.8 K/UL (ref 0.1–1.3)
MONOCYTES NFR BLD: 9 % (ref 4–12)
NEUTS SEG # BLD: 5.5 K/UL (ref 1.7–8.2)
NEUTS SEG NFR BLD: 62 % (ref 43–78)
NRBC # BLD: 0 K/UL (ref 0–0.2)
PLATELET # BLD AUTO: 315 K/UL (ref 150–450)
PMV BLD AUTO: 9.5 FL (ref 9.4–12.3)
POTASSIUM SERPL-SCNC: 3.3 MMOL/L (ref 3.5–5.1)
RBC # BLD AUTO: 3.23 M/UL (ref 4.23–5.6)
SERVICE CMNT-IMP: ABNORMAL
SODIUM SERPL-SCNC: 141 MMOL/L (ref 133–143)
WBC # BLD AUTO: 8.9 K/UL (ref 4.3–11.1)

## 2023-11-23 PROCEDURE — 2580000003 HC RX 258: Performed by: FAMILY MEDICINE

## 2023-11-23 PROCEDURE — 1100000000 HC RM PRIVATE

## 2023-11-23 PROCEDURE — 6370000000 HC RX 637 (ALT 250 FOR IP): Performed by: FAMILY MEDICINE

## 2023-11-23 PROCEDURE — 82962 GLUCOSE BLOOD TEST: CPT

## 2023-11-23 PROCEDURE — 80048 BASIC METABOLIC PNL TOTAL CA: CPT

## 2023-11-23 PROCEDURE — 36415 COLL VENOUS BLD VENIPUNCTURE: CPT

## 2023-11-23 PROCEDURE — 83735 ASSAY OF MAGNESIUM: CPT

## 2023-11-23 PROCEDURE — 6360000002 HC RX W HCPCS: Performed by: FAMILY MEDICINE

## 2023-11-23 PROCEDURE — 85025 COMPLETE CBC W/AUTO DIFF WBC: CPT

## 2023-11-23 RX ADMIN — PIPERACILLIN AND TAZOBACTAM 3375 MG: 3; .375 INJECTION, POWDER, LYOPHILIZED, FOR SOLUTION INTRAVENOUS at 23:13

## 2023-11-23 RX ADMIN — VANCOMYCIN HYDROCHLORIDE 1250 MG: 10 INJECTION, POWDER, LYOPHILIZED, FOR SOLUTION INTRAVENOUS at 17:17

## 2023-11-23 RX ADMIN — SODIUM CHLORIDE: 9 INJECTION, SOLUTION INTRAVENOUS at 14:38

## 2023-11-23 RX ADMIN — TRAMADOL HYDROCHLORIDE 50 MG: 50 TABLET ORAL at 17:11

## 2023-11-23 RX ADMIN — VERAPAMIL HYDROCHLORIDE 180 MG: 180 TABLET, FILM COATED, EXTENDED RELEASE ORAL at 08:22

## 2023-11-23 RX ADMIN — INSULIN GLARGINE 27 UNITS: 100 INJECTION, SOLUTION SUBCUTANEOUS at 21:25

## 2023-11-23 RX ADMIN — TRAMADOL HYDROCHLORIDE 50 MG: 50 TABLET ORAL at 08:30

## 2023-11-23 RX ADMIN — PREDNISONE 5 MG: 10 TABLET ORAL at 08:23

## 2023-11-23 RX ADMIN — LOSARTAN POTASSIUM 100 MG: 50 TABLET, FILM COATED ORAL at 08:22

## 2023-11-23 RX ADMIN — SODIUM CHLORIDE, PRESERVATIVE FREE 10 ML: 5 INJECTION INTRAVENOUS at 21:25

## 2023-11-23 RX ADMIN — TAMSULOSIN HYDROCHLORIDE 0.4 MG: 0.4 CAPSULE ORAL at 08:23

## 2023-11-23 RX ADMIN — PIPERACILLIN AND TAZOBACTAM 3375 MG: 3; .375 INJECTION, POWDER, LYOPHILIZED, FOR SOLUTION INTRAVENOUS at 06:06

## 2023-11-23 RX ADMIN — PIPERACILLIN AND TAZOBACTAM 3375 MG: 3; .375 INJECTION, POWDER, LYOPHILIZED, FOR SOLUTION INTRAVENOUS at 14:39

## 2023-11-23 RX ADMIN — Medication 1 CAPSULE: at 08:22

## 2023-11-23 ASSESSMENT — PAIN SCALES - GENERAL
PAINLEVEL_OUTOF10: 5
PAINLEVEL_OUTOF10: 0
PAINLEVEL_OUTOF10: 3
PAINLEVEL_OUTOF10: 6

## 2023-11-23 NOTE — PROGRESS NOTES
Hospitalist Progress Note   Admit Date:  2023  3:48 PM   Name:  Rao Carrasco   Age:  78 y.o. Sex:  male  :  1945   MRN:  [id number]   Room:  521/    Presenting/Chief Complaint: Wound Infection     Reason(s) for Admission: Septicemia [facility]) [A41.9]  Diabetic foot infection ([address] [facility]) [E11.628, L08.9]  Diabetic ulcer of left foot due to type 2 diabetes mellitus ([address] [facility]) [V63.536, [address] Course: Rao Carrasco is a 78 y.o. male with medical history of DM2, CKD3, chronic anemia who presented with  open left great toe lesion with necrosis. Was seen by  PCP office  for surgical clearance for low back surgery and that was when they discovered he had dry necrosis to his left great toe. They advised hospital admission for vascular evaluation with possible intervention. Patient admits to odor to left foot along with discharge. He denies taking any antibiotics. In ED, , lactic acid 6.3. Left foot x-ray shows no acute bony abnormalities; small linear foreign body in plantar soft tissues of midfoot     Patient was admitted with diabetic foot ulcers with SULTANA on CKD stage III. He was started on IV fluid and empirically started on Vanco/Zosyn. Renal function returned to baseline. Blood cultures remain NGTD. Wound cultures grew GPC. FREDDIE was obtained and shows right popliteal artery occlusion with evidence of small vessel occlusive disease bilaterally. Vascular surgery was consulted. Subjective & 24hr Events:   Patient was seen and examined at bedside this morning. He reports minimal pain discomfort around his toes. Denies any fevers, chills, shortness of breath, chest pain, nausea, vomiting. Assessment & Plan:     Diabetic foot ulcer/infection   Vascular surgery is following. Recommending no further intervention at this time. Patient currently has been on Vanco and Zosyn since his admission. Cultures currently growing gram-negative rods.   - Follow-up Labs:  Recent Results (from the past 48 hour(s))   POCT Glucose    Collection Time: 11/21/23 10:17 AM   Result Value Ref Range    POC Glucose 97 65 - 100 mg/dL    Performed by: Lemuel Fisher    POCT Glucose    Collection Time: 11/21/23 12:39 PM   Result Value Ref Range    POC Glucose 105 (H) 65 - 100 mg/dL    Performed by: Agnieszka Haynes.     POCT Glucose    Collection Time: 11/21/23  5:01 PM   Result Value Ref Range    POC Glucose 208 (H) 65 - 100 mg/dL    Performed by: Lemuel Fisher    POCT Glucose    Collection Time: 11/21/23  9:26 PM   Result Value Ref Range    POC Glucose 168 (H) 65 - 100 mg/dL    Performed by: Sebastien    Basic Metabolic Panel w/ Reflex to MG    Collection Time: 11/22/23  4:05 AM   Result Value Ref Range    Sodium 142 133 - 143 mmol/L    Potassium 4.0 3.5 - 5.1 mmol/L    Chloride 106 101 - 110 mmol/L    CO2 30 21 - 32 mmol/L    Anion Gap 6 2 - 11 mmol/L    Glucose 90 65 - 100 mg/dL    BUN 18 8 - 23 MG/DL    Creatinine 1.30 0.8 - 1.5 MG/DL    Est, Glom Filt Rate 57 (L) >60 ml/min/1.73m2    Calcium 8.9 8.3 - 10.4 MG/DL   CBC with Auto Differential    Collection Time: 11/22/23  4:05 AM   Result Value Ref Range    WBC 9.4 4.3 - 11.1 K/uL    RBC 3.22 (L) 4.23 - 5.6 M/uL    Hemoglobin 9.6 (L) 13.6 - 17.2 g/dL    Hematocrit 31.0 (L) 41.1 - 50.3 %    MCV 96.3 82 - 102 FL    MCH 29.8 26.1 - 32.9 PG    MCHC 31.0 (L) 31.4 - 35.0 g/dL    RDW 14.8 (H) 11.9 - 14.6 %    Platelets 834 656 - 895 K/uL    MPV 9.9 9.4 - 12.3 FL    nRBC 0.00 0.0 - 0.2 K/uL    Differential Type AUTOMATED      Neutrophils % 72 43 - 78 %    Lymphocytes % 18 13 - 44 %    Monocytes % 8 4.0 - 12.0 %    Eosinophils % 1 0.5 - 7.8 %    Basophils % 1 0.0 - 2.0 %    Immature Granulocytes 0 0.0 - 5.0 %    Neutrophils Absolute 6.7 1.7 - 8.2 K/UL    Lymphocytes Absolute 1.7 0.5 - 4.6 K/UL    Monocytes Absolute 0.8 0.1 - 1.3 K/UL    Eosinophils Absolute 0.1 0.0 - 0.8 K/UL    Basophils Absolute 0.1 0.0 - 0.2 K/UL    Absolute Immature

## 2023-11-24 VITALS
BODY MASS INDEX: 28.99 KG/M2 | DIASTOLIC BLOOD PRESSURE: 65 MMHG | RESPIRATION RATE: 18 BRPM | TEMPERATURE: 99 F | HEIGHT: 70 IN | OXYGEN SATURATION: 98 % | HEART RATE: 82 BPM | SYSTOLIC BLOOD PRESSURE: 121 MMHG | WEIGHT: 202.5 LBS

## 2023-11-24 LAB
ANION GAP SERPL CALC-SCNC: 7 MMOL/L (ref 2–11)
BACTERIA SPEC CULT: ABNORMAL
BACTERIA SPEC CULT: ABNORMAL
BASOPHILS # BLD: 0.1 K/UL (ref 0–0.2)
BASOPHILS NFR BLD: 1 % (ref 0–2)
BUN SERPL-MCNC: 14 MG/DL (ref 8–23)
CALCIUM SERPL-MCNC: 9 MG/DL (ref 8.3–10.4)
CHLORIDE SERPL-SCNC: 108 MMOL/L (ref 101–110)
CO2 SERPL-SCNC: 28 MMOL/L (ref 21–32)
CREAT SERPL-MCNC: 1.4 MG/DL (ref 0.8–1.5)
DIFFERENTIAL METHOD BLD: ABNORMAL
EOSINOPHIL # BLD: 0.1 K/UL (ref 0–0.8)
EOSINOPHIL NFR BLD: 2 % (ref 0.5–7.8)
ERYTHROCYTE [DISTWIDTH] IN BLOOD BY AUTOMATED COUNT: 14.6 % (ref 11.9–14.6)
GLUCOSE BLD STRIP.AUTO-MCNC: 112 MG/DL (ref 65–100)
GLUCOSE BLD STRIP.AUTO-MCNC: 145 MG/DL (ref 65–100)
GLUCOSE BLD STRIP.AUTO-MCNC: 96 MG/DL (ref 65–100)
GLUCOSE SERPL-MCNC: 49 MG/DL (ref 65–100)
GRAM STN SPEC: ABNORMAL
GRAM STN SPEC: ABNORMAL
HCT VFR BLD AUTO: 31.3 % (ref 41.1–50.3)
HGB BLD-MCNC: 9.9 G/DL (ref 13.6–17.2)
IMM GRANULOCYTES # BLD AUTO: 0 K/UL (ref 0–0.5)
IMM GRANULOCYTES NFR BLD AUTO: 0 % (ref 0–5)
LYMPHOCYTES # BLD: 2.1 K/UL (ref 0.5–4.6)
LYMPHOCYTES NFR BLD: 24 % (ref 13–44)
MAGNESIUM SERPL-MCNC: 2.1 MG/DL (ref 1.8–2.4)
MCH RBC QN AUTO: 30.5 PG (ref 26.1–32.9)
MCHC RBC AUTO-ENTMCNC: 31.6 G/DL (ref 31.4–35)
MCV RBC AUTO: 96.3 FL (ref 82–102)
MONOCYTES # BLD: 0.8 K/UL (ref 0.1–1.3)
MONOCYTES NFR BLD: 9 % (ref 4–12)
MRSA DNA SPEC QL NAA+PROBE: NOT DETECTED
NEUTS SEG # BLD: 5.6 K/UL (ref 1.7–8.2)
NEUTS SEG NFR BLD: 64 % (ref 43–78)
NRBC # BLD: 0 K/UL (ref 0–0.2)
PLATELET # BLD AUTO: 324 K/UL (ref 150–450)
PMV BLD AUTO: 9.8 FL (ref 9.4–12.3)
POTASSIUM SERPL-SCNC: 3.3 MMOL/L (ref 3.5–5.1)
RBC # BLD AUTO: 3.25 M/UL (ref 4.23–5.6)
S AUREUS CPE NOSE QL NAA+PROBE: NOT DETECTED
SERVICE CMNT-IMP: ABNORMAL
SERVICE CMNT-IMP: NORMAL
SODIUM SERPL-SCNC: 143 MMOL/L (ref 133–143)
WBC # BLD AUTO: 8.7 K/UL (ref 4.3–11.1)

## 2023-11-24 PROCEDURE — 82962 GLUCOSE BLOOD TEST: CPT

## 2023-11-24 PROCEDURE — 2580000003 HC RX 258: Performed by: FAMILY MEDICINE

## 2023-11-24 PROCEDURE — 97530 THERAPEUTIC ACTIVITIES: CPT

## 2023-11-24 PROCEDURE — 6360000002 HC RX W HCPCS: Performed by: FAMILY MEDICINE

## 2023-11-24 PROCEDURE — 80048 BASIC METABOLIC PNL TOTAL CA: CPT

## 2023-11-24 PROCEDURE — 6370000000 HC RX 637 (ALT 250 FOR IP): Performed by: FAMILY MEDICINE

## 2023-11-24 PROCEDURE — 85025 COMPLETE CBC W/AUTO DIFF WBC: CPT

## 2023-11-24 PROCEDURE — 6370000000 HC RX 637 (ALT 250 FOR IP): Performed by: INTERNAL MEDICINE

## 2023-11-24 PROCEDURE — 36415 COLL VENOUS BLD VENIPUNCTURE: CPT

## 2023-11-24 PROCEDURE — 83735 ASSAY OF MAGNESIUM: CPT

## 2023-11-24 RX ORDER — LOSARTAN POTASSIUM 100 MG/1
100 TABLET ORAL DAILY
Qty: 30 TABLET | Refills: 3 | Status: SHIPPED | OUTPATIENT
Start: 2023-11-25

## 2023-11-24 RX ORDER — LEVOFLOXACIN 500 MG/1
750 TABLET, FILM COATED ORAL DAILY
Status: DISCONTINUED | OUTPATIENT
Start: 2023-11-24 | End: 2023-11-24 | Stop reason: HOSPADM

## 2023-11-24 RX ORDER — LEVOFLOXACIN 750 MG/1
750 TABLET, FILM COATED ORAL DAILY
Qty: 6 TABLET | Refills: 0 | Status: SHIPPED | OUTPATIENT
Start: 2023-11-25 | End: 2023-12-01

## 2023-11-24 RX ADMIN — POTASSIUM CHLORIDE 40 MEQ: 1500 TABLET, EXTENDED RELEASE ORAL at 08:25

## 2023-11-24 RX ADMIN — LOSARTAN POTASSIUM 100 MG: 50 TABLET, FILM COATED ORAL at 08:25

## 2023-11-24 RX ADMIN — LEVOFLOXACIN 750 MG: 500 TABLET, FILM COATED ORAL at 10:35

## 2023-11-24 RX ADMIN — Medication 1 CAPSULE: at 08:26

## 2023-11-24 RX ADMIN — TAMSULOSIN HYDROCHLORIDE 0.4 MG: 0.4 CAPSULE ORAL at 08:25

## 2023-11-24 RX ADMIN — TRAMADOL HYDROCHLORIDE 50 MG: 50 TABLET ORAL at 06:28

## 2023-11-24 RX ADMIN — SODIUM CHLORIDE, PRESERVATIVE FREE 10 ML: 5 INJECTION INTRAVENOUS at 08:27

## 2023-11-24 RX ADMIN — VERAPAMIL HYDROCHLORIDE 180 MG: 180 TABLET, FILM COATED, EXTENDED RELEASE ORAL at 08:26

## 2023-11-24 RX ADMIN — PREDNISONE 5 MG: 10 TABLET ORAL at 08:26

## 2023-11-24 RX ADMIN — PIPERACILLIN AND TAZOBACTAM 3375 MG: 3; .375 INJECTION, POWDER, LYOPHILIZED, FOR SOLUTION INTRAVENOUS at 06:27

## 2023-11-24 ASSESSMENT — PAIN DESCRIPTION - ONSET: ONSET: AWAKENED FROM SLEEP

## 2023-11-24 ASSESSMENT — PAIN DESCRIPTION - PAIN TYPE: TYPE: CHRONIC PAIN

## 2023-11-24 ASSESSMENT — PAIN - FUNCTIONAL ASSESSMENT: PAIN_FUNCTIONAL_ASSESSMENT: PREVENTS OR INTERFERES SOME ACTIVE ACTIVITIES AND ADLS

## 2023-11-24 ASSESSMENT — PAIN DESCRIPTION - FREQUENCY: FREQUENCY: CONTINUOUS

## 2023-11-24 ASSESSMENT — PAIN DESCRIPTION - ORIENTATION: ORIENTATION: MID

## 2023-11-24 ASSESSMENT — PAIN DESCRIPTION - LOCATION: LOCATION: GENERALIZED

## 2023-11-24 ASSESSMENT — PAIN SCALES - GENERAL: PAINLEVEL_OUTOF10: 7

## 2023-11-24 ASSESSMENT — PAIN DESCRIPTION - DESCRIPTORS: DESCRIPTORS: ACHING

## 2023-11-24 NOTE — PROGRESS NOTES
Patient discharge instructions completed. IV's were removed and prescriptions sent to patients pharmacy. Follow-up appointments were discussed. Patient returning home with family and rolled out via wheel chair by staff.

## 2023-11-24 NOTE — DISCHARGE SUMMARY
2nd toe with smaller ulcer about 0.5cm in diameter. Skin:                No rashes and normal coloration. Warm and dry. Sacral ulcer to granulation tissue noted in R buttock. Neuro:             CN II-XII grossly intact. Psych:             Normal mood and affect. Signed:  Nazanin Meng MD    Part of this note may have been written by using a voice dictation software. The note has been proof read but may still contain some grammatical/other typographical errors.

## 2023-11-24 NOTE — CARE COORDINATION
ASSESSMENT NOTE    Attending Physician: No att. providers found  Admit Problem: Septicemia ([address]) [A41.9]  Diabetic foot infection ([address]) [Z23.140, L08.9]  Diabetic ulcer of left foot due to type 2 diabetes mellitus ([address]) [V21.837, L97.529]  Date/Time of Admission: 11/20/2023  3:48 PM  Problem List:  Patient Active Problem List   Diagnosis    Encounter for therapeutic drug level monitoring    Fatigue    Uncontrolled type 2 diabetes mellitus with hyperglycemia, with long-term current use of insulin (HCC)    Decreased dexterity    Chronically elevated hemidiaphragm    Statin intolerance    HNP (herniated nucleus pulposus) with myelopathy, cervical    Vitamin D deficiency    Iron deficiency anemia secondary to inadequate dietary iron intake    Type 2 diabetes mellitus with diabetic neuropathy (HCC)    Opioid use, unspecified with unspecified opioid-induced disorder ([address])    Hyperuricemia    Other long term (current) drug therapy    Long term current use of systemic steroids    Mixed hyperlipidemia    Other specified disorders of bone density and structure, other site    Controlled diabetes mellitus type II without complication (HCC)    Rheumatoid arthritis ([address])    Renal mass, left    Essential hypertension    Type II diabetes mellitus, uncontrolled    Polyarthralgia    Spinal stenosis of lumbar region with neurogenic claudication    Spinal stenosis, lumbar region, with neurogenic claudication    Chronic renal disease, stage III ([address]) [240063]    Diabetic ulcer of left foot due to type 2 diabetes mellitus ([address])    Acute renal failure superimposed on stage 3 chronic kidney disease [facility])       Service Assessment  Patient Orientation Alert and Oriented   Cognition Alert   History Provided By Medical Record, Patient   Primary Caregiver Self   Accompanied By/Relationship n/a   Support Systems Spouse/Significant Other, Family Members   Patient's Healthcare Decision Maker is: Patient Declined (Legal Next of Kin Remains as

## 2023-11-24 NOTE — PROGRESS NOTES
Min Mod Max Total  NT x2 Comments:   Bed Mobility    Rolling [] [] [] [] [] [] [] [] [] [] []    Supine to Sit [] [] [] [] [] [] [] [] [] [] []    Scooting [] [] [] [] [] [] [] [] [] [] []    Sit to Supine [] [] [] [] [] [] [] [] [] [] []    Transfers    Sit to Stand [] [] [] [x] [] [] [] [] [] [] []    Bed to Chair [] [] [] [x] [] [] [] [] [] [] []    Stand to Sit [] [] [] [x] [] [] [] [] [] [] []     [] [] [] [] [] [] [] [] [] [] []    I=Independent, Mod I=Modified Independent, S=Supervision, SBA=Standby Assistance, CGA=Contact Guard Assistance,   Min=Minimal Assistance, Mod=Moderate Assistance, Max=Maximal Assistance, Total=Total Assistance, NT=Not Tested    BALANCE: Good Fair+ Fair Fair- Poor NT Comments   Sitting Static [x] [] [] [] [] []    Sitting Dynamic [x] [] [] [] [] []              Standing Static [] [x] [] [] [] []    Standing Dynamic [] [] [x] [] [] []      GAIT: I Mod I S SBA CGA Min Mod Max Total  NT x2 Comments:   Level of Assistance [] [] [] [x] [x] [] [] [] [] [] []    Distance 100 feet    DME Rolling Walker    Gait Quality Decreased sajan , Decreased step clearance, and Decreased step length    Weightbearing Status      Stairs      I=Independent, Mod I=Modified Independent, S=Supervision, SBA=Standby Assistance, CGA=Contact Guard Assistance,   Min=Minimal Assistance, Mod=Moderate Assistance, Max=Maximal Assistance, Total=Total Assistance, NT=Not Tested    PLAN:   FREQUENCY AND DURATION: 3 times/week for duration of hospital stay or until stated goals are met, whichever comes first.    TREATMENT:   TREATMENT:   Therapeutic Activity (13 Minutes): Therapeutic activity included Scooting, Ambulation on level ground, Sitting balance , and Standing balance to improve functional Activity tolerance, Balance, and Mobility.     TREATMENT GRID:  N/A    AFTER TREATMENT PRECAUTIONS: Bed/Chair Locked, Call light within reach, Chair, and Needs within reach    INTERDISCIPLINARY COLLABORATION:  RN/ PCT    EDUCATION:      TIME IN/OUT:  Time In: 1139  Time Out: 1152  Minutes: [address] [place], RAÚL

## 2023-11-27 ENCOUNTER — CARE COORDINATION (OUTPATIENT)
Dept: CARE COORDINATION | Facility: CLINIC | Age: 78
End: 2023-11-27

## 2023-11-27 ENCOUNTER — TELEPHONE (OUTPATIENT)
Dept: INTERNAL MEDICINE CLINIC | Facility: CLINIC | Age: 78
End: 2023-11-27

## 2023-11-27 NOTE — CARE COORDINATION
Care Transitions Initial Follow Up Call    Call within 2 business days of discharge: Yes    Patient Current Location:  Home: [address] [place] [id number]    Care Transition Nurse contacted the patient and spouse/partner by telephone to perform post hospital discharge assessment. Verified name and  with patient and spouse/partner as identifiers. Provided introduction to self, and explanation of the Care Transition Nurse role. Patient: Mirtha Ko Patient : 1945   MRN: [id number]  Reason for Admission: Diabetic foot infection. Discharge Date: 23 RARS: Readmission Risk Score: 14.7      Last Discharge Facility       Date Complaint Diagnosis Description Type Department Provider    23 Wound Infection Diabetic foot infection ([address] [facility]) . .. ED to Hosp-Admission (Discharged) (ADMITTED) [id number] Eligio Haro MD; Ayaan Chan,... Was this an external facility discharge? No Discharge Facility:     Challenges to be reviewed by the provider   Additional needs identified to be addressed with provider: Yes  Follow up appointment, pending surgical procedure               Method of communication with provider: chart routing. History of: DM2, CKD3, chronic anemia , chronic wound infection/open left great toe lesion with necrosis. Sent to ED by PCP on 2023 for vascular evaluation. Patient/spouse reports that they are doing ok. Medications have been filled and patient is taking per instructions. Patient is scheduled to have L3-5 Laminectomy for spinal stenosis on 2023. Care Transition Nurse reviewed discharge instructions, medical action plan, and red flags with patient who verbalized understanding. The patient was given an opportunity to ask questions and does not have any further questions or concerns at this time. Were discharge instructions available to patient? Yes.  Reviewed appropriate site of care based on symptoms and resources available to

## 2023-11-27 NOTE — TELEPHONE ENCOUNTER
Called and notified patient that he would need to be seen by Ines Castellanos NP this week for a hospital follow-up.  Patient verbalized understanding and is scheduled for tomorrow (11/28/23) at 2:30 PM.

## 2023-11-28 ENCOUNTER — HOSPITAL ENCOUNTER (OUTPATIENT)
Dept: SURGERY | Age: 78
Discharge: HOME | End: 2023-12-01
Payer: MEDICARE

## 2023-11-28 ENCOUNTER — OFFICE VISIT (OUTPATIENT)
Dept: INTERNAL MEDICINE CLINIC | Facility: CLINIC | Age: 78
End: 2023-11-28

## 2023-11-28 VITALS
TEMPERATURE: 97.7 F | OXYGEN SATURATION: 100 % | HEIGHT: 69 IN | RESPIRATION RATE: 18 BRPM | WEIGHT: 207.3 LBS | HEART RATE: 96 BPM | BODY MASS INDEX: 30.7 KG/M2 | SYSTOLIC BLOOD PRESSURE: 173 MMHG | DIASTOLIC BLOOD PRESSURE: 86 MMHG

## 2023-11-28 VITALS
HEART RATE: 102 BPM | WEIGHT: 207 LBS | OXYGEN SATURATION: 100 % | DIASTOLIC BLOOD PRESSURE: 78 MMHG | HEIGHT: 69 IN | SYSTOLIC BLOOD PRESSURE: 180 MMHG | BODY MASS INDEX: 30.66 KG/M2

## 2023-11-28 DIAGNOSIS — L97.529 DIABETIC ULCER OF LEFT FOOT DUE TO TYPE 2 DIABETES MELLITUS (HCC): ICD-10-CM

## 2023-11-28 DIAGNOSIS — E87.6 HYPOKALEMIA: ICD-10-CM

## 2023-11-28 DIAGNOSIS — E11.621 DIABETIC ULCER OF LEFT FOOT DUE TO TYPE 2 DIABETES MELLITUS (HCC): ICD-10-CM

## 2023-11-28 DIAGNOSIS — Z09 HOSPITAL DISCHARGE FOLLOW-UP: Primary | ICD-10-CM

## 2023-11-28 DIAGNOSIS — L97.519 DIABETIC ULCER OF TOE OF RIGHT FOOT ASSOCIATED WITH TYPE 2 DIABETES MELLITUS, UNSPECIFIED ULCER STAGE (HCC): ICD-10-CM

## 2023-11-28 DIAGNOSIS — E11.621 DIABETIC ULCER OF TOE OF RIGHT FOOT ASSOCIATED WITH TYPE 2 DIABETES MELLITUS, UNSPECIFIED ULCER STAGE (HCC): ICD-10-CM

## 2023-11-28 DIAGNOSIS — E11.40 TYPE 2 DIABETES MELLITUS WITH DIABETIC NEUROPATHY, WITH LONG-TERM CURRENT USE OF INSULIN (HCC): ICD-10-CM

## 2023-11-28 DIAGNOSIS — I10 ESSENTIAL HYPERTENSION: ICD-10-CM

## 2023-11-28 DIAGNOSIS — Z79.4 TYPE 2 DIABETES MELLITUS WITH DIABETIC NEUROPATHY, WITH LONG-TERM CURRENT USE OF INSULIN (HCC): ICD-10-CM

## 2023-11-28 LAB
EST. AVERAGE GLUCOSE BLD GHB EST-MCNC: 131 MG/DL
GLUCOSE BLD STRIP.AUTO-MCNC: 74 MG/DL (ref 65–100)
HBA1C MFR BLD: 6.2 % (ref 4.8–5.6)
SERVICE CMNT-IMP: NORMAL

## 2023-11-28 PROCEDURE — 82962 GLUCOSE BLOOD TEST: CPT

## 2023-11-28 PROCEDURE — 87641 MR-STAPH DNA AMP PROBE: CPT

## 2023-11-28 PROCEDURE — 83036 HEMOGLOBIN GLYCOSYLATED A1C: CPT

## 2023-11-28 ASSESSMENT — PAIN DESCRIPTION - PAIN TYPE: TYPE: CHRONIC PAIN

## 2023-11-28 ASSESSMENT — PAIN DESCRIPTION - LOCATION: LOCATION: BACK

## 2023-11-28 ASSESSMENT — PAIN DESCRIPTION - FREQUENCY: FREQUENCY: CONTINUOUS

## 2023-11-28 ASSESSMENT — PAIN DESCRIPTION - ORIENTATION: ORIENTATION: LOWER

## 2023-11-28 ASSESSMENT — PAIN DESCRIPTION - DESCRIPTORS: DESCRIPTORS: ACHING

## 2023-11-28 ASSESSMENT — PAIN - FUNCTIONAL ASSESSMENT: PAIN_FUNCTIONAL_ASSESSMENT: INTOLERABLE, UNABLE TO DO ANY ACTIVE OR PASSIVE ACTIVITIES

## 2023-11-28 ASSESSMENT — PAIN SCALES - GENERAL: PAINLEVEL_OUTOF10: 6

## 2023-11-28 NOTE — PRE-PROCEDURE INSTRUCTIONS
Patient verified name, , and surgery as listed in [facility]. Patient provided medical/health information and PTA medications to the best of their ability. TYPE  CASE: 2  Orders per surgeon: not received  Labs per surgeon: mrsa/ hgba1c collected and results in Epic. Cbc amp in Epic from 2023. Labs per anesthesia protocol: POC glucose 74 . Instructed  Patient that if blood sugar 300 or > , surgery may be cancelled. EKG:  in Epic from 2023 and results wdl of anesthesia protocol    Patient states has follow up with PCP this pm. Instructed to call and notify nurse any change in medications or prescriptions. Patient verbalzied understanding     MRSA/MSSA swab collected; pharmacy to review and dose antibiotic as appropriate. Patient provided with and instructed on education handouts including Guide to Surgery, blood transfusions, pain management, and hand hygiene for the family and community, and [facility] brochure. Road to Recovery Spine surgery patient guide given. Vidya-hex and instructions given per hospital policy. Original medication prescription bottles were visualized during patient appointment. Patient teach back successful and patient demonstrates knowledge of instruction.

## 2023-11-28 NOTE — PROGRESS NOTES
tablet  Commonly known as: FOSAMAX     camphor-menthol-methyl salicylate 4-49-02 % Crea cream  Commonly known as: BENGAY ULTRA STRENGTH     ibuprofen 200 MG tablet  Commonly known as: ADVIL;MOTRIN     leflunomide 20 MG tablet  Commonly known as: ARAVA     losartan 100 MG tablet  Commonly known as: COZAAR  Take 1 tablet by mouth daily     metFORMIN 1000 MG tablet  Commonly known as: GLUCOPHAGE  Take 1 tablet by mouth 2 times daily (with meals) TAKE ONE TABLET BY MOUTH TWICE A DAY WITH MEALS     MULTIVITAMIN ADULT PO     mupirocin 2 % ointment  Commonly known as: BACTROBAN  Apply to wound bed and cover with non-adhesive dressing twice a day. predniSONE 5 MG tablet  Commonly known as: DELTASONE     tamsulosin 0.4 MG capsule  Commonly known as: FLOMAX  Take 1 capsule by mouth daily     traMADol 50 MG tablet  Commonly known as: [place] FlexTouch 200 UNIT/ML Sopn  Generic drug: Insulin Degludec  Inject 40 units into skin every night. verapamil 180 MG extended release capsule  Commonly known as: VERELAN  Take 1 capsule by mouth daily     vitamin D 50 MCG (2000 UT) Caps capsule  Commonly known as: CHOLECALCIFEROL                Medications marked \"taking\" at this time  Outpatient Medications Marked as Taking for the 11/28/23 encounter (Office Visit) with EMMA Samuel NP   Medication Sig Dispense Refill    levoFLOXacin (LEVAQUIN) 750 MG tablet Take 1 tablet by mouth daily for 6 doses (Patient taking differently: Take 1 tablet by mouth daily Last dose 11/30/2023) 6 tablet 0    losartan (COZAAR) 100 MG tablet Take 1 tablet by mouth daily 30 tablet 3    traMADol (ULTRAM) 50 MG tablet Take 1 tablet by mouth every 6 hours as needed.       verapamil (VERELAN) 180 MG extended release capsule Take 1 capsule by mouth daily 90 capsule 3    tamsulosin (FLOMAX) 0.4 MG capsule Take 1 capsule by mouth daily 90 capsule 3    mupirocin (BACTROBAN) 2 % ointment Apply to wound bed and cover with non-adhesive

## 2023-11-28 NOTE — PRE-PROCEDURE INSTRUCTIONS
PLEASE CONTINUE TAKING ALL PRESCRIPTION MEDICATIONS UP TO THE DAY OF SURGERY UNLESS OTHERWISE DIRECTED BELOW. You may take Tylenol, allergy,  and/or indigestion medications. TAKE ONLY THESE MEDICATIONS ON THE DAY OF SURGERY       PREDNISONE  TAMSULOSIN  TRAMADOL IF NEEDED  VERAPAMIL       TAKE 32 UNITS OF TRESIBA THE NIGHT PRIOR TO SURGERY     DISCONTINUE all vitamins and supplements VITAMIN D, MULTIVITAMIN days prior to surgery. DISCONTINUE Non-Steroidal Anti-Inflammatory (NSAIDS) such as Advil and Aleve, IBUPROFEN 5 days prior to surgery. Home Medications to Hold- please continue all other medications except these. DO NOT TAKE LOSARTAN MORNING OF SURGERY   DO NOT METFORMIN MORNING OF SURGERY     Comments      On the day before surgery please take 2 Tylenol in the morning and then again before bed. You may use either regular or extra strength. Please do not bring home medications with you on the day of surgery unless otherwise directed by your nurse. If you are instructed to bring home medications, please give them to your nurse as they will be administered by the nursing staff. If you have any questions, please call [address] [place] [address] [phone]. A copy of this note was provided to the patient for reference.

## 2023-11-29 LAB
ANION GAP SERPL CALC-SCNC: 5 MMOL/L (ref 2–11)
BUN SERPL-MCNC: 20 MG/DL (ref 8–23)
CALCIUM SERPL-MCNC: 9.4 MG/DL (ref 8.3–10.4)
CHLORIDE SERPL-SCNC: 111 MMOL/L (ref 101–110)
CO2 SERPL-SCNC: 25 MMOL/L (ref 21–32)
CREAT SERPL-MCNC: 1.6 MG/DL (ref 0.8–1.5)
EST. AVERAGE GLUCOSE BLD GHB EST-MCNC: 140 MG/DL
GLUCOSE SERPL-MCNC: 123 MG/DL (ref 65–100)
HBA1C MFR BLD: 6.5 % (ref 4.8–5.6)
MRSA DNA SPEC QL NAA+PROBE: NOT DETECTED
POTASSIUM SERPL-SCNC: 4.1 MMOL/L (ref 3.5–5.1)
S AUREUS CPE NOSE QL NAA+PROBE: NOT DETECTED
SODIUM SERPL-SCNC: 141 MMOL/L (ref 133–143)
URATE SERPL-MCNC: 5.6 MG/DL (ref 2.6–6)

## 2023-12-06 ENCOUNTER — CARE COORDINATION (OUTPATIENT)
Dept: CARE COORDINATION | Facility: CLINIC | Age: 78
End: 2023-12-06

## 2023-12-06 ASSESSMENT — ENCOUNTER SYMPTOMS
BACK PAIN: 1
WHEEZING: 0
SHORTNESS OF BREATH: 0
CHEST TIGHTNESS: 0
ABDOMINAL PAIN: 0

## 2023-12-06 NOTE — CARE COORDINATION
support self-management-voices understanding, and Assessment and support for treatment adherence and medication management-voices understanding    Offered patient enrollment in the Remote Patient Monitoring (RPM) program for in-home monitoring: Yes, but did not enroll at this time. Care Transitions Subsequent and Final Call    Subsequent and Final Calls  Care Transitions Interventions                          Other Interventions:             Care Transition Nurse provided contact information for future needs. Plan for follow-up call in 10-14 days based on severity of symptoms and risk factors. Plan for next call:  concerns/questions.     David Jones RN

## 2023-12-20 ENCOUNTER — TELEPHONE (OUTPATIENT)
Dept: INTERNAL MEDICINE CLINIC | Facility: CLINIC | Age: 78
End: 2023-12-20

## 2023-12-20 DIAGNOSIS — M06.09 RHEUMATOID ARTHRITIS OF MULTIPLE SITES WITH NEGATIVE RHEUMATOID FACTOR (HCC): Primary | ICD-10-CM

## 2023-12-20 NOTE — TELEPHONE ENCOUNTER
Patient's wife called in stating that she needed to speak to Agnieszka directly, I advised her that she is out the office till the 27th. Tried to get the reasoning for the call but pt's wife refused to give me an answer. I advised her that she probably won't get a call back till after the 27th.

## 2023-12-29 ENCOUNTER — CARE COORDINATION (OUTPATIENT)
Dept: CARE COORDINATION | Facility: CLINIC | Age: 78
End: 2023-12-29

## 2023-12-29 RX ORDER — PREDNISONE 5 MG/1
10 TABLET ORAL DAILY
Qty: 180 TABLET | Refills: 0 | Status: SHIPPED | OUTPATIENT
Start: 2023-12-29

## 2023-12-29 NOTE — TELEPHONE ENCOUNTER
Wife calling requesting refill on prednisone.  She was advised that he will need to get that from his rheumatologist.  She states she called the rheumatology office to cancel his appointment due to his pending surgery.  However, he had missed an appointment and due to late cancellation she was advised he would be discharged from the practice due to being a no-show for 3 appointments.  Ms. Allen states Mr. Allen is okay with going back to [facility] to see the nurse practitioner Ms. Godinez if they will allow him to return.  Will send communication to Ms. Godinez.  His prednisone prescription is refilled for a 90-day supply as courtesy to allow time for obtaining rheumatology appointment.

## 2023-12-29 NOTE — CARE COORDINATION
Patient has graduated from the Care Transitions program on 12/29/2023. Patient/family has the ability to self-manage at this time. Patient has no further care management needs, no referral to the [facility] team for further management. Patient has Care Transition Nurse's contact information for any further questions, concerns, or needs. Patients upcoming visits:    Future Appointments   Date Time Provider [address]   3/1/2024  2:00 PM EMMA Hung [facility] AMB           Message sent to patient's PCP per request.    Patient has been unable to schedule surgery for L3-5 Laminectomy for spinal stenosis. Per family, insurance denied surgical procedure. Continues to wait for insurance and practice to follow up.

## 2024-01-09 ENCOUNTER — OFFICE VISIT (OUTPATIENT)
Dept: NEUROSURGERY | Age: 79
End: 2024-01-09
Payer: MEDICARE

## 2024-01-09 VITALS
DIASTOLIC BLOOD PRESSURE: 100 MMHG | WEIGHT: 204 LBS | OXYGEN SATURATION: 95 % | TEMPERATURE: 98.1 F | BODY MASS INDEX: 30.21 KG/M2 | HEIGHT: 69 IN | SYSTOLIC BLOOD PRESSURE: 181 MMHG | HEART RATE: 99 BPM

## 2024-01-09 DIAGNOSIS — E11.621 DIABETIC ULCER OF LEFT FOOT DUE TO TYPE 2 DIABETES MELLITUS (HCC): Primary | ICD-10-CM

## 2024-01-09 DIAGNOSIS — L97.529 DIABETIC ULCER OF LEFT FOOT DUE TO TYPE 2 DIABETES MELLITUS (HCC): Primary | ICD-10-CM

## 2024-01-09 DIAGNOSIS — M48.062 SPINAL STENOSIS, LUMBAR REGION, WITH NEUROGENIC CLAUDICATION: ICD-10-CM

## 2024-01-09 PROCEDURE — 99214 OFFICE O/P EST MOD 30 MIN: CPT | Performed by: NEUROLOGICAL SURGERY

## 2024-01-09 PROCEDURE — 3077F SYST BP >= 140 MM HG: CPT | Performed by: NEUROLOGICAL SURGERY

## 2024-01-09 PROCEDURE — 1124F ACP DISCUSS-NO DSCNMKR DOCD: CPT | Performed by: NEUROLOGICAL SURGERY

## 2024-01-09 PROCEDURE — 3080F DIAST BP >= 90 MM HG: CPT | Performed by: NEUROLOGICAL SURGERY

## 2024-01-09 ASSESSMENT — ENCOUNTER SYMPTOMS: BACK PAIN: 1

## 2024-01-09 NOTE — ASSESSMENT & PLAN NOTE
Although he states that he looks better I am concerned about the integrity of his left great toe and forefoot.  Until this is adequately taking care of I think the infection risk posed with a large lumbar surgery is great enough that we should defer surgery until that has been completely addressed.

## 2024-01-09 NOTE — PROGRESS NOTES
Attention and Perception: Attention and perception normal.         Mood and Affect: Mood normal.         Speech: Speech normal.         Behavior: Behavior normal.         Thought Content: Thought content normal.         Cognition and Memory: Cognition normal.         Judgment: Judgment normal.          Imaging: I personally reviewed: No new imaging studies were reviewed at today's visit      Problem List Items Addressed This Visit          Endocrine    Diabetic ulcer of left foot due to type 2 diabetes mellitus (HCC) - Primary     Although he states that he looks better I am concerned about the integrity of his left great toe and forefoot.  Until this is adequately taking care of I think the infection risk posed with a large lumbar surgery is great enough that we should defer surgery until that has been completely addressed.              Other    Spinal stenosis, lumbar region, with neurogenic claudication     Once his sugars are under better control and his foot wound has completely resolved we will hold off on his back surgery.  He already says he sees pain management and it might be reasonable to temporize him with epidural steroid injections.  I will let them be the  of with this pathology if it is wise to try to temporize him in that fashion.              1. Diabetic ulcer of left foot due to type 2 diabetes mellitus (HCC)  Assessment & Plan:  Although he states that he looks better I am concerned about the integrity of his left great toe and forefoot.  Until this is adequately taking care of I think the infection risk posed with a large lumbar surgery is great enough that we should defer surgery until that has been completely addressed.    2. Spinal stenosis, lumbar region, with neurogenic claudication  Assessment & Plan:  Once his sugars are under better control and his foot wound has completely resolved we will hold off on his back surgery.  He already says he sees pain management and it might be

## 2024-01-09 NOTE — ASSESSMENT & PLAN NOTE
Once his sugars are under better control and his foot wound has completely resolved we will hold off on his back surgery.  He already says he sees pain management and it might be reasonable to temporize him with epidural steroid injections.  I will let them be the  of with this pathology if it is wise to try to temporize him in that fashion.

## 2024-01-23 ENCOUNTER — HOSPITAL ENCOUNTER (OUTPATIENT)
Dept: WOUND CARE | Age: 79
Discharge: HOME | End: 2024-01-23
Payer: MEDICARE

## 2024-01-23 VITALS
BODY MASS INDEX: 31.1 KG/M2 | DIASTOLIC BLOOD PRESSURE: 87 MMHG | TEMPERATURE: 99 F | HEIGHT: 69 IN | RESPIRATION RATE: 18 BRPM | HEART RATE: 87 BPM | SYSTOLIC BLOOD PRESSURE: 175 MMHG | WEIGHT: 210 LBS

## 2024-01-23 DIAGNOSIS — L97.524 DIABETIC ULCER OF TOE OF LEFT FOOT ASSOCIATED WITH TYPE 2 DIABETES MELLITUS, WITH NECROSIS OF BONE (HCC): ICD-10-CM

## 2024-01-23 DIAGNOSIS — I70.245 ATHEROSCLEROSIS OF NATIVE ARTERY OF BOTH LOWER EXTREMITIES WITH BILATERAL ULCERATION OF OTHER PART OF FEET (HCC): ICD-10-CM

## 2024-01-23 DIAGNOSIS — I70.235 ATHEROSCLEROSIS OF NATIVE ARTERY OF BOTH LOWER EXTREMITIES WITH BILATERAL ULCERATION OF OTHER PART OF FEET (HCC): ICD-10-CM

## 2024-01-23 DIAGNOSIS — E11.621 DIABETIC ULCER OF TOE OF LEFT FOOT ASSOCIATED WITH TYPE 2 DIABETES MELLITUS, WITH NECROSIS OF BONE (HCC): ICD-10-CM

## 2024-01-23 DIAGNOSIS — L03.032 CELLULITIS OF GREAT TOE, LEFT: Primary | ICD-10-CM

## 2024-01-23 PROBLEM — I70.25 ATHEROSCLEROSIS OF NATIVE ARTERY OF EXTREMITY WITH ULCERATION (HCC): Status: ACTIVE | Noted: 2024-01-23

## 2024-01-23 PROBLEM — E11.40 TYPE 2 DIABETES MELLITUS WITH DIABETIC NEUROPATHY (HCC): Chronic | Status: ACTIVE | Noted: 2018-03-14

## 2024-01-23 PROCEDURE — 99203 OFFICE O/P NEW LOW 30 MIN: CPT | Performed by: FAMILY MEDICINE

## 2024-01-23 PROCEDURE — 11044 DBRDMT BONE 1ST 20 SQ CM/<: CPT

## 2024-01-23 PROCEDURE — 87205 SMEAR GRAM STAIN: CPT

## 2024-01-23 PROCEDURE — 87186 SC STD MICRODIL/AGAR DIL: CPT

## 2024-01-23 PROCEDURE — 87176 TISSUE HOMOGENIZATION CULTR: CPT

## 2024-01-23 PROCEDURE — 11044 DBRDMT BONE 1ST 20 SQ CM/<: CPT | Performed by: FAMILY MEDICINE

## 2024-01-23 PROCEDURE — 87070 CULTURE OTHR SPECIMN AEROBIC: CPT

## 2024-01-23 PROCEDURE — 87075 CULTR BACTERIA EXCEPT BLOOD: CPT

## 2024-01-23 PROCEDURE — 87077 CULTURE AEROBIC IDENTIFY: CPT

## 2024-01-23 RX ORDER — SODIUM CHLOR/HYPOCHLOROUS ACID 0.033 %
SOLUTION, IRRIGATION IRRIGATION ONCE
OUTPATIENT
Start: 2024-01-23 | End: 2024-01-23

## 2024-01-23 RX ORDER — GINSENG 100 MG
CAPSULE ORAL ONCE
OUTPATIENT
Start: 2024-01-23 | End: 2024-01-23

## 2024-01-23 RX ORDER — LIDOCAINE HYDROCHLORIDE 40 MG/ML
SOLUTION TOPICAL ONCE
OUTPATIENT
Start: 2024-01-23 | End: 2024-01-23

## 2024-01-23 RX ORDER — GENTAMICIN SULFATE 1 MG/G
OINTMENT TOPICAL ONCE
OUTPATIENT
Start: 2024-01-23 | End: 2024-01-23

## 2024-01-23 RX ORDER — TRIAMCINOLONE ACETONIDE 1 MG/G
OINTMENT TOPICAL ONCE
OUTPATIENT
Start: 2024-01-23 | End: 2024-01-23

## 2024-01-23 RX ORDER — IBUPROFEN 200 MG
TABLET ORAL ONCE
OUTPATIENT
Start: 2024-01-23 | End: 2024-01-23

## 2024-01-23 RX ORDER — LIDOCAINE HYDROCHLORIDE 20 MG/ML
JELLY TOPICAL ONCE
OUTPATIENT
Start: 2024-01-23 | End: 2024-01-23

## 2024-01-23 RX ORDER — BACITRACIN ZINC AND POLYMYXIN B SULFATE 500; 1000 [USP'U]/G; [USP'U]/G
OINTMENT TOPICAL ONCE
OUTPATIENT
Start: 2024-01-23 | End: 2024-01-23

## 2024-01-23 RX ORDER — LEVOFLOXACIN 500 MG/1
500 TABLET, FILM COATED ORAL DAILY
Qty: 10 TABLET | Refills: 0 | Status: SHIPPED | OUTPATIENT
Start: 2024-01-23 | End: 2024-02-02

## 2024-01-23 RX ORDER — BETAMETHASONE DIPROPIONATE 0.5 MG/G
CREAM TOPICAL ONCE
OUTPATIENT
Start: 2024-01-23 | End: 2024-01-23

## 2024-01-23 RX ORDER — CLOBETASOL PROPIONATE 0.5 MG/G
OINTMENT TOPICAL ONCE
OUTPATIENT
Start: 2024-01-23 | End: 2024-01-23

## 2024-01-23 RX ORDER — LIDOCAINE 50 MG/G
OINTMENT TOPICAL ONCE
OUTPATIENT
Start: 2024-01-23 | End: 2024-01-23

## 2024-01-23 RX ORDER — LIDOCAINE 40 MG/G
CREAM TOPICAL ONCE
OUTPATIENT
Start: 2024-01-23 | End: 2024-01-23

## 2024-01-23 NOTE — PROGRESS NOTES
[facility]   History and Physical Note   Referring Provider:    Agnieszka Manzano, EMMA Thomas NP     Reason for Referral: Chronic left great toe ulcer    Ney Allen  MEDICAL RECORD NUMBER:  [id number]  AGE: 78 y.o.   GENDER: male  : 1945  EPISODE DATE:  2024    Chief complaint and reason for visit:     Chief Complaint   Patient presents with    Wound Check     Left great toe diabetic wound.         HISTORY of PRESENT ILLNESS HPI     Ney Allen is a 78 y.o. male who presents today for an initial evaluation of a wound/ulcer. Patient is new to the wound center. Wound duration:  6 month(s).    History of Wound Context: Patient comes in today with a chronic ulcer on his left great toe.  He was seen in the hospital a month or so ago where he was placed on antibiotics based on culture data.  Proteus grew out.  He is finished that course.  X-ray was negative.  Wound is still persistent.  Patient does admit this wound being there for over 6 months.  Initially was a shoe that rubbed this area.  Patient is also needing back surgery but this cannot be done until this wound is healed up.  Also of note when he was hospitalized arterial studies were evaluated and he did have poor blood flow noted to the leg.  This has not been treated at this time.  Patient comes in today with family.  Blood sugars have recently been under excellent control with most recent A1c around 6.5.  Pertinent associated symptoms: drainage , redness, swelling, and skin discoloration    PAST MEDICAL HISTORY        Diagnosis Date    Anemia, unspecified     Arthritis     RA; spine/ general joints    Diabetes (HCC) dx     Type 2 po and insulin hgba1c 6.2 on 2023 No s/sx of hypo states av fbs 100-130    Hypertension     well controlled with meds    Iron deficiency anemia secondary to inadequate dietary iron intake 2015    Open wound of lower extremity 2020    Rash and other

## 2024-01-23 NOTE — WOUND CARE
Discharge Instructions for  [facility]  [address]  [address]  [place] [address]  Phone [phone]   Fax [phone]      NAME:  Ney Allen  YOB: 1945  MEDICAL RECORD NUMBER:  [id number]  DATE:  1/23/2024    Return Appointment:   1 week with Jairo Aguilar DO      Instructions: Left great toe:  Clean with normal saline or wound cleanser  Hydrofera Ready: Cut to wound size, place in wound bed, shiny side out.    Cover with ABD/or gauze, secure with roll gauze  Change 3 times a week    Patient to offload wound with  offloading sandal  while standing or weight bearing.    Do not get wound wet. May purchase cast cover at local pharmacy to keep dry in shower.  Wound healing is greatly slowed when blood glucose levels are greater than 200. Monitor glucose levels daily to ensure tight glucose control.  Follow up with PCP if your glucose levels are frequently greater than 200.  Increase protein intake to promote wound healing. Protein supplements such as Trey and Ensure are great options.  Would culture obtained today. Results can take up to 5 days.  MRI ordered today. Expect a call to schedule test. If you do not receive a call in 2 business days, please call [phone] to schedule.  ABIs (Arterial Brachial Index study) ordered per MD through [facility]. Expect a call to schedule test or call [phone] to initiate scheduling.       Please increase dietary protein to at least 100 grams per day to support cell rejuvenation.   May use supplements such as Ensure Max, Trey, & Glucerna (samples & coupons provided at first visit).   Be sure to spread intake throughout the day for improved absorption.      Antibiotics ordered (Levaquin) today.   at Pharmacy and start taking.    Should you experience increased redness, swelling, pain, foul odor, size of wound(s), or have a temperature over 101 degrees please contact the Wound Healing

## 2024-01-23 NOTE — FLOWSHEET NOTE
01/23/24 0819   Left Leg Edema Point of Measurement   Leg circumference 45.5 cm   Ankle circumference 28.5 cm   Foot circumference 27.5 cm   Peripheral Vascular   Peripheral Vascular (WDL) X   Edema Left lower extremity   LLE Neurovascular Assessment   Capillary Refill Greater than 3 seconds   Color Appropriate for Ethnicity   Temperature Warm   LLE Sensation  Decreased   L Pedal Pulse Doppler   Wound 01/23/24 Toe (Comment  which one) Left #1  Great toe   Date First Assessed/Time First Assessed: 01/23/24 0825   Wound Approximate Age at First Assessment (Weeks): 52 weeks  Primary Wound Type: Diabetic Ulcer  Location: Toe (Comment  which one)  Wound Location Orientation: Left  Wound Description (Comments...   Wound Image     Wound Etiology Diabetic Zhao 2   Dressing Status Other (Comment)  (open to air)   Wound Cleansed Cleansed with saline   Dressing/Treatment Betadine swabs/povidone iodine   Wound Length (cm) 0.9 cm   Wound Width (cm) 0.5 cm   Wound Depth (cm) 0.1 cm   Wound Surface Area (cm^2) 0.45 cm^2   Wound Volume (cm^3) 0.045 cm^3   Post-Procedure Length (cm) 0.9 cm   Post-Procedure Width (cm) 0.5 cm   Post-Procedure Depth (cm) 0.1 cm   Post-Procedure Surface Area (cm^2) 0.45 cm^2   Post-Procedure Volume (cm^3) 0.045 cm^3   Wound Assessment Pink/red;Slough;Exposed structure bone   Drainage Amount Small (< 25%)   Drainage Description Serous   Odor None   Rebecca-wound Assessment Hyperkeratosis (callous)   Wound Thickness Description not for Pressure Injury Full thickness   Pain Assessment   Pain Assessment None - Denies Pain

## 2024-01-26 LAB
BACTERIA SPEC CULT: ABNORMAL
BACTERIA SPEC CULT: NORMAL
GRAM STN SPEC: ABNORMAL
GRAM STN SPEC: ABNORMAL
SERVICE CMNT-IMP: ABNORMAL
SERVICE CMNT-IMP: NORMAL

## 2024-01-29 ENCOUNTER — OFFICE VISIT (OUTPATIENT)
Dept: VASCULAR SURGERY | Age: 79
End: 2024-01-29
Payer: MEDICARE

## 2024-01-29 ENCOUNTER — PREP FOR PROCEDURE (OUTPATIENT)
Dept: VASCULAR SURGERY | Age: 79
End: 2024-01-29

## 2024-01-29 VITALS
WEIGHT: 208 LBS | HEART RATE: 103 BPM | DIASTOLIC BLOOD PRESSURE: 86 MMHG | OXYGEN SATURATION: 94 % | HEIGHT: 69 IN | BODY MASS INDEX: 30.81 KG/M2 | SYSTOLIC BLOOD PRESSURE: 174 MMHG

## 2024-01-29 DIAGNOSIS — E11.621 DIABETIC ULCER OF TOE OF LEFT FOOT ASSOCIATED WITH TYPE 2 DIABETES MELLITUS, WITH NECROSIS OF BONE (HCC): Primary | ICD-10-CM

## 2024-01-29 DIAGNOSIS — E11.65 UNCONTROLLED TYPE 2 DIABETES MELLITUS WITH HYPERGLYCEMIA, WITH LONG-TERM CURRENT USE OF INSULIN (HCC): ICD-10-CM

## 2024-01-29 DIAGNOSIS — L97.524 DIABETIC ULCER OF TOE OF LEFT FOOT ASSOCIATED WITH TYPE 2 DIABETES MELLITUS, WITH NECROSIS OF BONE (HCC): Primary | ICD-10-CM

## 2024-01-29 DIAGNOSIS — Z79.4 UNCONTROLLED TYPE 2 DIABETES MELLITUS WITH HYPERGLYCEMIA, WITH LONG-TERM CURRENT USE OF INSULIN (HCC): ICD-10-CM

## 2024-01-29 DIAGNOSIS — I73.9 PAD (PERIPHERAL ARTERY DISEASE) (HCC): ICD-10-CM

## 2024-01-29 DIAGNOSIS — I73.9 PVD (PERIPHERAL VASCULAR DISEASE) (HCC): ICD-10-CM

## 2024-01-29 LAB
BACTERIA SPEC CULT: NORMAL
SERVICE CMNT-IMP: NORMAL

## 2024-01-29 PROCEDURE — 3079F DIAST BP 80-89 MM HG: CPT | Performed by: STUDENT IN AN ORGANIZED HEALTH CARE EDUCATION/TRAINING PROGRAM

## 2024-01-29 PROCEDURE — 3077F SYST BP >= 140 MM HG: CPT | Performed by: STUDENT IN AN ORGANIZED HEALTH CARE EDUCATION/TRAINING PROGRAM

## 2024-01-29 PROCEDURE — 99214 OFFICE O/P EST MOD 30 MIN: CPT | Performed by: STUDENT IN AN ORGANIZED HEALTH CARE EDUCATION/TRAINING PROGRAM

## 2024-01-29 NOTE — PROGRESS NOTES
VASCULAR SURGERY   [address] [address]  [phone] FAX: [phone]        Ney Allen  : 1945    Reason for visit: Let 1st toe ulcer    Chief Complaint: 78 y.o. male presents with PAD and left toe wound. Wound is probe to bone and clinically indicates osteomyelitis of the left 1st toe. DUS with right popliteal occlusion and tibial disease. Toe pressure >40 with adequate wound healing potential.     Plan:   Left 1st toe ulcer to bone: Recommend left 1st toe amputation. Discussed with patient adequate wound healing potential without vascular intervention. Should wound not heal would then proceed with angiogram with possible intervention.     Level 4 and Progression of chronic illness    Imaging interrupted:   BLE Art DUS  Occluded right popliteal artery.     Evidence of small vessel occlusive disease bilaterally.    Constitutional:   Negative for fevers and unexplained weight loss.  Eyes:   Negative for visual disturbance.  ENT:   Negative for significant hearing loss and tinnitus.  Respiratory:   Negative for hemoptysis.  Cardiovascular:   Negative except as noted in HPI.  Gastrointestinal:   Negative for melena and abdominal pain.  Genitourinary:   Negative for hematuria, renal stones.  Integumentary:   Negative for rash or non-healing wounds  Hematologic/Lymphatic:   Negative for excessive bleeding hx or clotting disorder.  Musculoskeletal:  Negative for active, unexplained/severe joint pain.  Neurological:   Negative for stroke.    Behavioral/Psych:   Negative for suicidal ideations.    Endocrine:   Negative for uncontrolled diabetic symptoms including polyuria, polydipsia and POSITIVE poor wound healing.     Physical Examination:   Height: 1.74 m (5' 8.5\"), Weight - Scale: 94.3 kg (208 lb), BP: (!) 174/86    General: No acute distress  HENT: AT  CV: Regular rhythm.    LUNG: No respiratory distress on RA  Abdominal: No distension.  Extremities: [id number]

## 2024-01-30 ENCOUNTER — ANESTHESIA EVENT (OUTPATIENT)
Dept: SURGERY | Age: 79
End: 2024-01-30
Payer: MEDICARE

## 2024-01-30 RX ORDER — OLMESARTAN MEDOXOMIL AND HYDROCHLOROTHIAZIDE 40/25 40; 25 MG/1; MG/1
1 TABLET ORAL DAILY
COMMUNITY

## 2024-01-30 NOTE — PERIOP NOTE
Patient verified name and .  Order for consent NOT found in EHR and matches case posting; patient verifies procedure.   Type 1B surgery, PHONE assessment complete.  Orders NOT received.  Labs per surgeon: NONE  Labs per anesthesia protocol: SQBS (DOS)    Patient answered medical/surgical history questions at their best of ability. All prior to admission medications documented in EPIC.    Patient instructed to take the following medications the day of surgery according to anesthesia guidelines with a small sip of water:     Tramadol- You may take as prescribed if needed on the day of surgery.  Verapamil (Verelan)  Prednisone  Levofloxacin  Tamsulosin (Flomax)            On the day before surgery please take 2 Tylenol in the morning and then again before bed. You may use either regular or extra strength.   Hold all vitamins 7 days prior to surgery and NSAIDS 5 days prior to surgery.       Prescription meds to hold:  Leflunomide (Arava)- Please do not take day of surgery.   Metformin - Please do not take day of surgery  3. Losartan (Cozaar) - Please do not take day of surgery  4. Olmesartan-HCTZ- Please do not take day of surgery  5. Multivitamin/Vitamin D- Please start to hold as of now.   6. Fosamax- Please do not take morning of surgery.      Patient instructed on the following:    > Arrive at MAIN Entrance, time of arrival to be called the day before by 1700  > NPO after midnight, unless otherwise indicated, including gum, mints, and ice chips  > Responsible adult must drive patient to the hospital, stay during surgery, and patient will need supervision 24 hours after anesthesia  > Use non moisturizing soap in shower the night before surgery and on the morning of surgery  > All piercings must be removed prior to arrival.    > Leave all valuables (money and jewelry) at home but bring insurance card and ID on DOS.   > You may be required to pay a deductible or co-pay on the day of your procedure. You can pre-pay

## 2024-01-31 ENCOUNTER — HOSPITAL ENCOUNTER (OUTPATIENT)
Age: 79
Setting detail: OUTPATIENT SURGERY
Discharge: HOME | End: 2024-01-31
Attending: STUDENT IN AN ORGANIZED HEALTH CARE EDUCATION/TRAINING PROGRAM | Admitting: STUDENT IN AN ORGANIZED HEALTH CARE EDUCATION/TRAINING PROGRAM
Payer: MEDICARE

## 2024-01-31 ENCOUNTER — ANESTHESIA (OUTPATIENT)
Dept: SURGERY | Age: 79
End: 2024-01-31
Payer: MEDICARE

## 2024-01-31 VITALS
RESPIRATION RATE: 15 BRPM | DIASTOLIC BLOOD PRESSURE: 88 MMHG | BODY MASS INDEX: 31.22 KG/M2 | WEIGHT: 206 LBS | HEART RATE: 82 BPM | TEMPERATURE: 98 F | SYSTOLIC BLOOD PRESSURE: 173 MMHG | OXYGEN SATURATION: 96 % | HEIGHT: 68 IN

## 2024-01-31 DIAGNOSIS — I73.9 PVD (PERIPHERAL VASCULAR DISEASE) (HCC): ICD-10-CM

## 2024-01-31 DIAGNOSIS — E11.621 DIABETIC ULCER OF TOE OF LEFT FOOT ASSOCIATED WITH TYPE 2 DIABETES MELLITUS, WITH NECROSIS OF BONE (HCC): Primary | ICD-10-CM

## 2024-01-31 DIAGNOSIS — L97.524 DIABETIC ULCER OF TOE OF LEFT FOOT ASSOCIATED WITH TYPE 2 DIABETES MELLITUS, WITH NECROSIS OF BONE (HCC): Primary | ICD-10-CM

## 2024-01-31 LAB
GLUCOSE BLD STRIP.AUTO-MCNC: 100 MG/DL (ref 65–100)
GLUCOSE BLD STRIP.AUTO-MCNC: 95 MG/DL (ref 65–100)
POTASSIUM BLD-SCNC: 3.7 MMOL/L (ref 3.5–5.1)
SERVICE CMNT-IMP: NORMAL
SERVICE CMNT-IMP: NORMAL

## 2024-01-31 PROCEDURE — 6360000002 HC RX W HCPCS: Performed by: STUDENT IN AN ORGANIZED HEALTH CARE EDUCATION/TRAINING PROGRAM

## 2024-01-31 PROCEDURE — 76942 ECHO GUIDE FOR BIOPSY: CPT | Performed by: STUDENT IN AN ORGANIZED HEALTH CARE EDUCATION/TRAINING PROGRAM

## 2024-01-31 PROCEDURE — 7100000000 HC PACU RECOVERY - FIRST 15 MIN: Performed by: STUDENT IN AN ORGANIZED HEALTH CARE EDUCATION/TRAINING PROGRAM

## 2024-01-31 PROCEDURE — 87075 CULTR BACTERIA EXCEPT BLOOD: CPT

## 2024-01-31 PROCEDURE — 2580000003 HC RX 258: Performed by: STUDENT IN AN ORGANIZED HEALTH CARE EDUCATION/TRAINING PROGRAM

## 2024-01-31 PROCEDURE — 2709999900 HC NON-CHARGEABLE SUPPLY: Performed by: STUDENT IN AN ORGANIZED HEALTH CARE EDUCATION/TRAINING PROGRAM

## 2024-01-31 PROCEDURE — 7100000001 HC PACU RECOVERY - ADDTL 15 MIN: Performed by: STUDENT IN AN ORGANIZED HEALTH CARE EDUCATION/TRAINING PROGRAM

## 2024-01-31 PROCEDURE — 7100000011 HC PHASE II RECOVERY - ADDTL 15 MIN: Performed by: STUDENT IN AN ORGANIZED HEALTH CARE EDUCATION/TRAINING PROGRAM

## 2024-01-31 PROCEDURE — 88305 TISSUE EXAM BY PATHOLOGIST: CPT

## 2024-01-31 PROCEDURE — 3600000012 HC SURGERY LEVEL 2 ADDTL 15MIN: Performed by: STUDENT IN AN ORGANIZED HEALTH CARE EDUCATION/TRAINING PROGRAM

## 2024-01-31 PROCEDURE — 3700000000 HC ANESTHESIA ATTENDED CARE: Performed by: STUDENT IN AN ORGANIZED HEALTH CARE EDUCATION/TRAINING PROGRAM

## 2024-01-31 PROCEDURE — 82962 GLUCOSE BLOOD TEST: CPT

## 2024-01-31 PROCEDURE — 87205 SMEAR GRAM STAIN: CPT

## 2024-01-31 PROCEDURE — 87070 CULTURE OTHR SPECIMN AEROBIC: CPT

## 2024-01-31 PROCEDURE — 87176 TISSUE HOMOGENIZATION CULTR: CPT

## 2024-01-31 PROCEDURE — 3700000001 HC ADD 15 MINUTES (ANESTHESIA): Performed by: STUDENT IN AN ORGANIZED HEALTH CARE EDUCATION/TRAINING PROGRAM

## 2024-01-31 PROCEDURE — 3600000002 HC SURGERY LEVEL 2 BASE: Performed by: STUDENT IN AN ORGANIZED HEALTH CARE EDUCATION/TRAINING PROGRAM

## 2024-01-31 PROCEDURE — 84132 ASSAY OF SERUM POTASSIUM: CPT

## 2024-01-31 PROCEDURE — 6370000000 HC RX 637 (ALT 250 FOR IP): Performed by: STUDENT IN AN ORGANIZED HEALTH CARE EDUCATION/TRAINING PROGRAM

## 2024-01-31 PROCEDURE — 2500000003 HC RX 250 WO HCPCS: Performed by: STUDENT IN AN ORGANIZED HEALTH CARE EDUCATION/TRAINING PROGRAM

## 2024-01-31 PROCEDURE — 2580000003 HC RX 258

## 2024-01-31 PROCEDURE — 6360000002 HC RX W HCPCS

## 2024-01-31 PROCEDURE — 7100000010 HC PHASE II RECOVERY - FIRST 15 MIN: Performed by: STUDENT IN AN ORGANIZED HEALTH CARE EDUCATION/TRAINING PROGRAM

## 2024-01-31 PROCEDURE — 2500000003 HC RX 250 WO HCPCS

## 2024-01-31 RX ORDER — OXYCODONE HYDROCHLORIDE 5 MG/1
5 TABLET ORAL
Status: COMPLETED | OUTPATIENT
Start: 2024-01-31 | End: 2024-01-31

## 2024-01-31 RX ORDER — ONDANSETRON 2 MG/ML
4 INJECTION INTRAMUSCULAR; INTRAVENOUS
Status: DISCONTINUED | OUTPATIENT
Start: 2024-01-31 | End: 2024-01-31 | Stop reason: HOSPADM

## 2024-01-31 RX ORDER — PROCHLORPERAZINE EDISYLATE 5 MG/ML
5 INJECTION INTRAMUSCULAR; INTRAVENOUS
Status: DISCONTINUED | OUTPATIENT
Start: 2024-01-31 | End: 2024-01-31 | Stop reason: HOSPADM

## 2024-01-31 RX ORDER — SODIUM CHLORIDE, SODIUM LACTATE, POTASSIUM CHLORIDE, CALCIUM CHLORIDE 600; 310; 30; 20 MG/100ML; MG/100ML; MG/100ML; MG/100ML
INJECTION, SOLUTION INTRAVENOUS CONTINUOUS PRN
Status: DISCONTINUED | OUTPATIENT
Start: 2024-01-31 | End: 2024-01-31 | Stop reason: SDUPTHER

## 2024-01-31 RX ORDER — LIDOCAINE HYDROCHLORIDE 20 MG/ML
INJECTION, SOLUTION EPIDURAL; INFILTRATION; INTRACAUDAL; PERINEURAL PRN
Status: DISCONTINUED | OUTPATIENT
Start: 2024-01-31 | End: 2024-01-31 | Stop reason: SDUPTHER

## 2024-01-31 RX ORDER — HYDROMORPHONE HYDROCHLORIDE 2 MG/ML
0.5 INJECTION, SOLUTION INTRAMUSCULAR; INTRAVENOUS; SUBCUTANEOUS EVERY 5 MIN PRN
Status: DISCONTINUED | OUTPATIENT
Start: 2024-01-31 | End: 2024-01-31 | Stop reason: HOSPADM

## 2024-01-31 RX ORDER — BUPIVACAINE HYDROCHLORIDE 5 MG/ML
INJECTION, SOLUTION EPIDURAL; INTRACAUDAL
Status: COMPLETED | OUTPATIENT
Start: 2024-01-31 | End: 2024-01-31

## 2024-01-31 RX ORDER — SODIUM CHLORIDE 0.9 % (FLUSH) 0.9 %
5-40 SYRINGE (ML) INJECTION EVERY 12 HOURS SCHEDULED
Status: DISCONTINUED | OUTPATIENT
Start: 2024-01-31 | End: 2024-01-31 | Stop reason: HOSPADM

## 2024-01-31 RX ORDER — LIDOCAINE HYDROCHLORIDE 10 MG/ML
1 INJECTION, SOLUTION INFILTRATION; PERINEURAL
Status: DISCONTINUED | OUTPATIENT
Start: 2024-01-31 | End: 2024-01-31 | Stop reason: HOSPADM

## 2024-01-31 RX ORDER — PROPOFOL 10 MG/ML
INJECTION, EMULSION INTRAVENOUS PRN
Status: DISCONTINUED | OUTPATIENT
Start: 2024-01-31 | End: 2024-01-31 | Stop reason: SDUPTHER

## 2024-01-31 RX ORDER — FENTANYL CITRATE 50 UG/ML
100 INJECTION, SOLUTION INTRAMUSCULAR; INTRAVENOUS
Status: DISCONTINUED | OUTPATIENT
Start: 2024-01-31 | End: 2024-01-31 | Stop reason: HOSPADM

## 2024-01-31 RX ORDER — LIDOCAINE HYDROCHLORIDE 10 MG/ML
INJECTION, SOLUTION INFILTRATION; PERINEURAL PRN
Status: DISCONTINUED | OUTPATIENT
Start: 2024-01-31 | End: 2024-01-31 | Stop reason: ALTCHOICE

## 2024-01-31 RX ORDER — MIDAZOLAM HYDROCHLORIDE 2 MG/2ML
2 INJECTION, SOLUTION INTRAMUSCULAR; INTRAVENOUS
Status: DISCONTINUED | OUTPATIENT
Start: 2024-01-31 | End: 2024-01-31 | Stop reason: HOSPADM

## 2024-01-31 RX ORDER — SODIUM CHLORIDE 0.9 % (FLUSH) 0.9 %
5-40 SYRINGE (ML) INJECTION PRN
Status: DISCONTINUED | OUTPATIENT
Start: 2024-01-31 | End: 2024-01-31 | Stop reason: HOSPADM

## 2024-01-31 RX ORDER — SODIUM CHLORIDE 9 MG/ML
INJECTION, SOLUTION INTRAVENOUS PRN
Status: DISCONTINUED | OUTPATIENT
Start: 2024-01-31 | End: 2024-01-31 | Stop reason: HOSPADM

## 2024-01-31 RX ORDER — SODIUM CHLORIDE, SODIUM LACTATE, POTASSIUM CHLORIDE, CALCIUM CHLORIDE 600; 310; 30; 20 MG/100ML; MG/100ML; MG/100ML; MG/100ML
INJECTION, SOLUTION INTRAVENOUS CONTINUOUS
Status: DISCONTINUED | OUTPATIENT
Start: 2024-01-31 | End: 2024-01-31 | Stop reason: HOSPADM

## 2024-01-31 RX ORDER — OXYCODONE HYDROCHLORIDE AND ACETAMINOPHEN 5; 325 MG/1; MG/1
1 TABLET ORAL EVERY 6 HOURS PRN
Qty: 12 TABLET | Refills: 0 | Status: SHIPPED | OUTPATIENT
Start: 2024-01-31 | End: 2024-02-03

## 2024-01-31 RX ADMIN — PROPOFOL 100 MCG/KG/MIN: 10 INJECTION, EMULSION INTRAVENOUS at 09:13

## 2024-01-31 RX ADMIN — LIDOCAINE HYDROCHLORIDE 40 MG: 20 INJECTION, SOLUTION EPIDURAL; INFILTRATION; INTRACAUDAL; PERINEURAL at 09:09

## 2024-01-31 RX ADMIN — PROPOFOL 120 MG: 10 INJECTION, EMULSION INTRAVENOUS at 09:09

## 2024-01-31 RX ADMIN — SODIUM CHLORIDE, POTASSIUM CHLORIDE, SODIUM LACTATE AND CALCIUM CHLORIDE: 600; 310; 30; 20 INJECTION, SOLUTION INTRAVENOUS at 08:10

## 2024-01-31 RX ADMIN — BUPIVACAINE HYDROCHLORIDE 30 ML: 5 INJECTION, SOLUTION EPIDURAL; INTRACAUDAL; PERINEURAL at 08:25

## 2024-01-31 RX ADMIN — SODIUM CHLORIDE, SODIUM LACTATE, POTASSIUM CHLORIDE, AND CALCIUM CHLORIDE: 600; 310; 30; 20 INJECTION, SOLUTION INTRAVENOUS at 09:03

## 2024-01-31 RX ADMIN — OXYCODONE HYDROCHLORIDE 5 MG: 5 TABLET ORAL at 10:26

## 2024-01-31 ASSESSMENT — PAIN DESCRIPTION - DESCRIPTORS: DESCRIPTORS: ACHING

## 2024-01-31 ASSESSMENT — PAIN - FUNCTIONAL ASSESSMENT
PAIN_FUNCTIONAL_ASSESSMENT: 0-10
PAIN_FUNCTIONAL_ASSESSMENT: NONE - DENIES PAIN
PAIN_FUNCTIONAL_ASSESSMENT: 0-10

## 2024-01-31 ASSESSMENT — PAIN SCALES - GENERAL: PAINLEVEL_OUTOF10: 2

## 2024-01-31 NOTE — ANESTHESIA PROCEDURE NOTES
Peripheral Block    Patient location during procedure: pre-op  Reason for block: post-op pain management and at surgeon's request  Start time: 1/31/2024 8:18 AM  End time: 1/31/2024 8:25 AM  Staffing  Performed: anesthesiologist   Anesthesiologist: Garth Mann MD  Performed by: Garth Mann MD  Authorized by: Garth Mann MD    Preanesthetic Checklist  Completed: patient identified, site marked, risks and benefits discussed, equipment checked, pre-op evaluation, timeout performed, anesthesia consent given, oxygen available and monitors applied/VS acknowledged  Peripheral Block   Patient position: supine  Prep: ChloraPrep  Provider prep: mask and sterile gloves  Patient monitoring: cardiac monitor, continuous pulse ox, oxygen, IV access, frequent blood pressure checks and responsive to questions  Block type: Ankle  Laterality: left  Injection technique: single-shot  Guidance: ultrasound guided    Needle   Needle type: insulated echogenic nerve stimulator needle   Needle gauge: 20 G  Needle localization: ultrasound guidance (minimal motor response at >0.4 mA)  Needle length: 10 cm  Assessment   Injection assessment: negative aspiration for heme, no paresthesia on injection, local visualized surrounding nerve on ultrasound and no intravascular symptoms  Paresthesia pain: none  Slow fractionated injection: yes  Hemodynamics: stable  Real-time US image taken/store: yes  Outcomes: patient tolerated procedure well and uncomplicated    Additional Notes  Posterior tibial nerve visualized on ultrasound.  Local anesthetic visualized infiltrating space around the nerve, image saved.  The remainder of the ankle block was performed using anatomical landmarks.  Medications Administered  BUPivacaine (MARCAINE) PF injection 0.5% - Perineural   30 mL - 1/31/2024 8:25:00 AM

## 2024-01-31 NOTE — PERIOP NOTE
Discharge instructions reviewed with pt and family member who verbalize understanding of follow up care.

## 2024-01-31 NOTE — DISCHARGE INSTRUCTIONS
ACTIVITY  As tolerated and as directed by your doctor.   Bathe or shower as directed by your doctor.     DIET  Clear liquids until no nausea or vomiting; then light diet for the first day.  Advance to regular diet on second day, unless your doctor orders otherwise.   If nausea and vomiting continues, call your doctor.     PAIN  Take pain medication as directed by your doctor.   Call your doctor if pain is NOT relieved by medication.   DO NOT take aspirin of blood thinners unless directed by your doctor.         CALL YOUR DOCTOR IF   Excessive bleeding that does not stop after holding pressure over the area  Temperature of 101 degrees F or above  Excessive redness, swelling or bruising, and/ or green or yellow, smelly discharge from incision    After general anesthesia or intravenous sedation, for 24 hours or while taking prescription Narcotics:  Limit your activities  A responsible adult needs to be with you for the next 24 hours  Do not drive and operate hazardous machinery  Do not make important personal or business decisions  Do not drink alcoholic beverages  If you have not urinated within 8 hours after discharge, and you are experiencing discomfort from urinary retention, please go to the nearest ED.  If you have sleep apnea and have a CPAP machine, please use it for all naps and sleeping.  Please use caution when taking narcotics and any of your home medications that may cause drowsiness.  *  Please give a list of your current medications to your Primary Care Provider.  *  Please update this list whenever your medications are discontinued, doses are      changed, or new medications (including over-the-counter products) are added.  *  Please carry medication information at all times in case of emergency situations.    These are general instructions for a healthy lifestyle:  No smoking/ No tobacco products/ Avoid exposure to second hand smoke  Surgeon General's Warning:  Quitting smoking now greatly reduces

## 2024-01-31 NOTE — PERIOP NOTE
Called Dr. Villalta re: pt's blood pressures, he said pt was okay to go home and take his BP meds.

## 2024-01-31 NOTE — OP NOTE
VASCULAR SURGERY   [address] [place] [id number]  [phone] FAX: [phone]        Pre-Op diagnosis:    Left 1st toe osteomyelitis     Post-Op diagnosis:    Left 1st toe osteomyelitis      Surgeon: Jonatan Pineda MD     Procedure:   Left 1st toe amputation     Complications: None     EBL: 5cc     Anesthesia: Regional block TIVA     Description of procedure: After informed consent was obtained the patient was brought to the operating room and placed in supine position.  Preoperative antibiotics were given.  Appropriate anesthesia was administered.  Timeout was performed confirming patient identity and procedure.  Patient was then prepped and draped in sterile fashion. Circumferential incision was made at the base of the left 1st toe. Sharp dissection to the phalanx. Bone cutter was used to excise the toe and sent to pathology. Rongeur was then used to resect to the proximal aspect of the phalanx. Proximal bone culture was sent. No purulence was noted. Resection was done until good bone character was noted. Wound wound copiously irrigated. Hemostasis was achieved. Wound was then closed with interrupted 2-0 vicryl and 2-0 Nylon in a vertical mattress fashion. Sterile dressing was applied. Patient was awoken and taken to PACU in stable condition.

## 2024-01-31 NOTE — ANESTHESIA POSTPROCEDURE EVALUATION
Department of Anesthesiology  Postprocedure Note    Patient: Ney Allen  MRN: [id number]  YOB: 1945  Date of evaluation: 1/31/2024    Procedure Summary       Date: 01/31/24 Room / Location: [facility] MAIN OR  / [facility] MAIN OR    Anesthesia Start: 0903 Anesthesia Stop: 0950    Procedure: LEFT 1ST TOE AMPUTATION (Left: First Toe) Diagnosis:       PVD (peripheral vascular disease) (HCC)      (PVD (peripheral vascular disease) (HCC) [I73.9])    Providers: Jonatan Pineda MD Responsible Provider: Garth Mann MD    Anesthesia Type: TIVA ASA Status: 3            Anesthesia Type: No value filed.    Adrian Phase I: Adrian Score: 10    Adrian Phase II: Adrian Score: 10    Anesthesia Post Evaluation    Patient location during evaluation: PACU  Patient participation: complete - patient participated  Level of consciousness: awake  Airway patency: patent  Nausea & Vomiting: no nausea and no vomiting  Cardiovascular status: blood pressure returned to baseline  Respiratory status: acceptable  Hydration status: euvolemic  Pain management: adequate    No notable events documented.

## 2024-01-31 NOTE — ANESTHESIA PRE PROCEDURE
03/06/2015    Statin intolerance 01/06/2017    Type II diabetes mellitus, uncontrolled 08/17/2015    Unspecified essential hypertension 03/09/2015       Past Surgical History:        Procedure Laterality Date    CERVICAL DISCECTOMY  11/30/2012    C3-C4;  Dr. Bernal    COLONOSCOPY      HEENT  age 14    R eye prosthesis       Social History:    Social History     Tobacco Use    Smoking status: Never    Smokeless tobacco: Former     Types: Chew     Quit date: 10/2023   Substance Use Topics    Alcohol use: No     Alcohol/week: 0.0 standard drinks of alcohol                                Counseling given: Not Answered      Vital Signs (Current):   Vitals:    01/31/24 0820 01/31/24 0825 01/31/24 0830 01/31/24 0835   BP: (!) 167/80 (!) 144/75 (!) 163/89 (!) 162/87   Pulse: 90 83 87 88   Resp: 12 14 24 22   Temp:       TempSrc:       SpO2: 100% 100% 100% 100%   Weight:       Height:                                                  BP Readings from Last 3 Encounters:   01/31/24 (!) 162/87   01/29/24 (!) 174/86   01/23/24 (!) 175/87       NPO Status: Time of last liquid consumption: 2359                        Time of last solid consumption: 2359                        Date of last liquid consumption: 01/30/24                        Date of last solid food consumption: 01/30/24    BMI:   Wt Readings from Last 3 Encounters:   01/31/24 93.4 kg (206 lb)   01/29/24 94.3 kg (208 lb)   01/23/24 95.3 kg (210 lb)     Body mass index is 31.32 kg/m².    CBC:   Lab Results   Component Value Date/Time    WBC 8.7 11/24/2023 05:14 AM    RBC 3.25 11/24/2023 05:14 AM    HGB 9.9 11/24/2023 05:14 AM    HCT 31.3 11/24/2023 05:14 AM    MCV 96.3 11/24/2023 05:14 AM    RDW 14.6 11/24/2023 05:14 AM     11/24/2023 05:14 AM       CMP:   Lab Results   Component Value Date/Time     11/28/2023 03:47 PM    K 4.1 11/28/2023 03:47 PM     11/28/2023 03:47 PM    CO2 25 11/28/2023 03:47 PM    BUN 20 11/28/2023 03:47 PM    CREATININE 1.60

## 2024-02-02 LAB
BACTERIA SPEC CULT: NORMAL
BACTERIA SPEC CULT: NORMAL
GRAM STN SPEC: NORMAL
GRAM STN SPEC: NORMAL
SERVICE CMNT-IMP: NORMAL
SERVICE CMNT-IMP: NORMAL

## 2024-02-05 LAB
BACTERIA SPEC CULT: NORMAL
SERVICE CMNT-IMP: NORMAL

## 2024-02-12 ENCOUNTER — OFFICE VISIT (OUTPATIENT)
Dept: VASCULAR SURGERY | Age: 79
End: 2024-02-12

## 2024-02-12 VITALS
WEIGHT: 206 LBS | OXYGEN SATURATION: 91 % | HEIGHT: 68 IN | SYSTOLIC BLOOD PRESSURE: 196 MMHG | HEART RATE: 102 BPM | BODY MASS INDEX: 31.22 KG/M2 | DIASTOLIC BLOOD PRESSURE: 94 MMHG

## 2024-02-12 DIAGNOSIS — L97.524 DIABETIC ULCER OF TOE OF LEFT FOOT ASSOCIATED WITH TYPE 2 DIABETES MELLITUS, WITH NECROSIS OF BONE (HCC): Primary | ICD-10-CM

## 2024-02-12 DIAGNOSIS — S98.112A AMPUTATION OF LEFT GREAT TOE (HCC): ICD-10-CM

## 2024-02-12 DIAGNOSIS — E11.621 DIABETIC ULCER OF TOE OF LEFT FOOT ASSOCIATED WITH TYPE 2 DIABETES MELLITUS, WITH NECROSIS OF BONE (HCC): Primary | ICD-10-CM

## 2024-02-12 PROCEDURE — 99024 POSTOP FOLLOW-UP VISIT: CPT | Performed by: NURSE PRACTITIONER

## 2024-02-12 NOTE — PROGRESS NOTES
DATE OF VISIT: 2/12/2024      [facility]  Ney Allen is a 78 y.o. male who follows-up for his left great toe amputation. He denies any N/V/F/D. He is only cleaning the wound every other day. He does report some drainage.         MEDICAL HISTORY:   Past Medical History:   Diagnosis Date    Anemia, unspecified     Arthritis     RA; spine/ general joints    Diabetes (HCC) dx 1995    Type 2 po and insulin hgba1c 6.2 on 11/28/2023 No s/sx of hypo states av fbs 100-130    Hypertension     well controlled with meds    Iron deficiency anemia secondary to inadequate dietary iron intake 03/06/2015    Open wound of lower extremity 03/11/2020    Rash and other nonspecific skin eruption     Rheumatoid arthritis(714.0) 03/06/2015    Statin intolerance 01/06/2017    Type II diabetes mellitus, uncontrolled 08/17/2015    Unspecified essential hypertension 03/09/2015         SURGICAL HISTORY:   Past Surgical History:   Procedure Laterality Date    CERVICAL DISCECTOMY  11/30/2012    C3-C4;  Dr. Bernal    COLONOSCOPY      HEENT  age 14    R eye prosthesis    TOE AMPUTATION Left 1/31/2024    LEFT 1ST TOE AMPUTATION performed by Jonatan Pineda MD at [facility] MAIN OR       Review of Systems:  Constitutional:   Negative for fevers and unexplained weight loss.  Respiratory:   Negative for hemoptysis.  Cardiovascular:   Negative except as noted in HPI.  Musculoskeletal:  Negative for active, unexplained/severe joint pain.      ALLERGIES:   Allergies   Allergen Reactions    Atorvastatin Other (See Comments)    Fluvastatin Other (See Comments)    Hydroxychloroquine Other (See Comments)       CURRENT MEDICATIONS:  Current Outpatient Medications   Medication Sig Dispense Refill    olmesartan-hydroCHLOROthiazide (BENICAR HCT) 40-25 MG per tablet Take 1 tablet by mouth daily      predniSONE (DELTASONE) 5 MG tablet Take 2 tablets by mouth Daily with food. 180 tablet 0    losartan (COZAAR) 100 MG tablet Take 1 tablet by mouth daily

## 2024-02-22 ENCOUNTER — OFFICE VISIT (OUTPATIENT)
Dept: VASCULAR SURGERY | Age: 79
End: 2024-02-22

## 2024-02-22 VITALS
WEIGHT: 206 LBS | BODY MASS INDEX: 31.22 KG/M2 | HEART RATE: 111 BPM | OXYGEN SATURATION: 93 % | DIASTOLIC BLOOD PRESSURE: 89 MMHG | HEIGHT: 68 IN | SYSTOLIC BLOOD PRESSURE: 176 MMHG

## 2024-02-22 DIAGNOSIS — E11.621 DIABETIC ULCER OF TOE OF LEFT FOOT ASSOCIATED WITH TYPE 2 DIABETES MELLITUS, WITH NECROSIS OF BONE (HCC): Primary | ICD-10-CM

## 2024-02-22 DIAGNOSIS — L97.524 DIABETIC ULCER OF TOE OF LEFT FOOT ASSOCIATED WITH TYPE 2 DIABETES MELLITUS, WITH NECROSIS OF BONE (HCC): Primary | ICD-10-CM

## 2024-02-22 PROCEDURE — 99024 POSTOP FOLLOW-UP VISIT: CPT | Performed by: NURSE PRACTITIONER

## 2024-02-22 NOTE — PROGRESS NOTES
DATE OF VISIT: 2/22/2024      [facility]  Ney Allen is a 78 y.o. male who follows-up for his left great toe amputation. He denies any N/V/F/D. He reports some increasing discoloration to the left 2nd toe.        MEDICAL HISTORY:   Past Medical History:   Diagnosis Date    Anemia, unspecified     Arthritis     RA; spine/ general joints    Diabetes (HCC) dx 1995    Type 2 po and insulin hgba1c 6.2 on 11/28/2023 No s/sx of hypo states av fbs 100-130    Hypertension     well controlled with meds    Iron deficiency anemia secondary to inadequate dietary iron intake 03/06/2015    Open wound of lower extremity 03/11/2020    Rash and other nonspecific skin eruption     Rheumatoid arthritis(714.0) 03/06/2015    Statin intolerance 01/06/2017    Type II diabetes mellitus, uncontrolled 08/17/2015    Unspecified essential hypertension 03/09/2015         SURGICAL HISTORY:   Past Surgical History:   Procedure Laterality Date    CERVICAL DISCECTOMY  11/30/2012    C3-C4;  Dr. Bernal    COLONOSCOPY      HEENT  age 14    R eye prosthesis    TOE AMPUTATION Left 1/31/2024    LEFT 1ST TOE AMPUTATION performed by Jonatan Pineda MD at [facility] MAIN OR       Review of Systems:  Constitutional:   Negative for fevers and unexplained weight loss.  Respiratory:   Negative for hemoptysis.  Cardiovascular:   Negative except as noted in HPI.  Musculoskeletal:  Negative for active, unexplained/severe joint pain.      ALLERGIES:   Allergies   Allergen Reactions    Atorvastatin Other (See Comments)    Fluvastatin Other (See Comments)    Hydroxychloroquine Other (See Comments)       CURRENT MEDICATIONS:  Current Outpatient Medications   Medication Sig Dispense Refill    olmesartan-hydroCHLOROthiazide (BENICAR HCT) 40-25 MG per tablet Take 1 tablet by mouth daily      predniSONE (DELTASONE) 5 MG tablet Take 2 tablets by mouth Daily with food. 180 tablet 0    losartan (COZAAR) 100 MG tablet Take 1 tablet by mouth daily 30 tablet 3

## 2024-03-01 ENCOUNTER — OFFICE VISIT (OUTPATIENT)
Dept: INTERNAL MEDICINE CLINIC | Facility: CLINIC | Age: 79
End: 2024-03-01
Payer: MEDICARE

## 2024-03-01 VITALS
HEART RATE: 99 BPM | OXYGEN SATURATION: 97 % | HEIGHT: 69 IN | RESPIRATION RATE: 18 BRPM | SYSTOLIC BLOOD PRESSURE: 156 MMHG | WEIGHT: 202 LBS | DIASTOLIC BLOOD PRESSURE: 80 MMHG | BODY MASS INDEX: 29.92 KG/M2

## 2024-03-01 DIAGNOSIS — Z79.4 TYPE 2 DIABETES MELLITUS WITH DIABETIC NEUROPATHY, WITH LONG-TERM CURRENT USE OF INSULIN (HCC): Chronic | ICD-10-CM

## 2024-03-01 DIAGNOSIS — I10 ESSENTIAL HYPERTENSION: Primary | Chronic | ICD-10-CM

## 2024-03-01 DIAGNOSIS — D50.8 IRON DEFICIENCY ANEMIA SECONDARY TO INADEQUATE DIETARY IRON INTAKE: ICD-10-CM

## 2024-03-01 DIAGNOSIS — I10 ESSENTIAL HYPERTENSION: Chronic | ICD-10-CM

## 2024-03-01 DIAGNOSIS — E55.9 VITAMIN D DEFICIENCY: ICD-10-CM

## 2024-03-01 DIAGNOSIS — E11.40 TYPE 2 DIABETES MELLITUS WITH DIABETIC NEUROPATHY, WITH LONG-TERM CURRENT USE OF INSULIN (HCC): Chronic | ICD-10-CM

## 2024-03-01 DIAGNOSIS — E79.0 HYPERURICEMIA: ICD-10-CM

## 2024-03-01 DIAGNOSIS — N18.30 STAGE 3 CHRONIC KIDNEY DISEASE, UNSPECIFIED WHETHER STAGE 3A OR 3B CKD (HCC): ICD-10-CM

## 2024-03-01 LAB
25(OH)D3 SERPL-MCNC: 47.3 NG/ML (ref 30–100)
ALBUMIN SERPL-MCNC: 3.8 G/DL (ref 3.2–4.6)
ALBUMIN/GLOB SERPL: 1.2 (ref 0.4–1.6)
ALP SERPL-CCNC: 67 U/L (ref 50–136)
ALT SERPL-CCNC: 17 U/L (ref 12–65)
ANION GAP SERPL CALC-SCNC: 8 MMOL/L (ref 2–11)
AST SERPL-CCNC: 9 U/L (ref 15–37)
BASOPHILS # BLD: 0.1 K/UL (ref 0–0.2)
BASOPHILS NFR BLD: 1 % (ref 0–2)
BILIRUB SERPL-MCNC: 0.2 MG/DL (ref 0.2–1.1)
BUN SERPL-MCNC: 26 MG/DL (ref 8–23)
CALCIUM SERPL-MCNC: 9.6 MG/DL (ref 8.3–10.4)
CHLORIDE SERPL-SCNC: 107 MMOL/L (ref 103–113)
CO2 SERPL-SCNC: 31 MMOL/L (ref 21–32)
CREAT SERPL-MCNC: 1.5 MG/DL (ref 0.8–1.5)
DIFFERENTIAL METHOD BLD: ABNORMAL
EOSINOPHIL # BLD: 0.1 K/UL (ref 0–0.8)
EOSINOPHIL NFR BLD: 1 % (ref 0.5–7.8)
ERYTHROCYTE [DISTWIDTH] IN BLOOD BY AUTOMATED COUNT: 13.8 % (ref 11.9–14.6)
EST. AVERAGE GLUCOSE BLD GHB EST-MCNC: 128 MG/DL
FERRITIN SERPL-MCNC: 18 NG/ML (ref 8–388)
GLOBULIN SER CALC-MCNC: 3.2 G/DL (ref 2.8–4.5)
GLUCOSE SERPL-MCNC: 108 MG/DL (ref 65–100)
HBA1C MFR BLD: 6.1 % (ref 4.8–5.6)
HCT VFR BLD AUTO: 34.8 % (ref 41.1–50.3)
HGB BLD-MCNC: 10.5 G/DL (ref 13.6–17.2)
IMM GRANULOCYTES # BLD AUTO: 0 K/UL (ref 0–0.5)
IMM GRANULOCYTES NFR BLD AUTO: 0 % (ref 0–5)
IRON SATN MFR SERPL: 22 %
IRON SERPL-MCNC: 69 UG/DL (ref 35–150)
LYMPHOCYTES # BLD: 1.3 K/UL (ref 0.5–4.6)
LYMPHOCYTES NFR BLD: 12 % (ref 13–44)
MCH RBC QN AUTO: 29.7 PG (ref 26.1–32.9)
MCHC RBC AUTO-ENTMCNC: 30.2 G/DL (ref 31.4–35)
MCV RBC AUTO: 98.6 FL (ref 82–102)
MONOCYTES # BLD: 0.7 K/UL (ref 0.1–1.3)
MONOCYTES NFR BLD: 6 % (ref 4–12)
NEUTS SEG # BLD: 8.4 K/UL (ref 1.7–8.2)
NEUTS SEG NFR BLD: 80 % (ref 43–78)
NRBC # BLD: 0 K/UL (ref 0–0.2)
PLATELET # BLD AUTO: 329 K/UL (ref 150–450)
PMV BLD AUTO: 10.6 FL (ref 9.4–12.3)
POTASSIUM SERPL-SCNC: 4.1 MMOL/L (ref 3.5–5.1)
PROT SERPL-MCNC: 7 G/DL (ref 6.3–8.2)
RBC # BLD AUTO: 3.53 M/UL (ref 4.23–5.6)
SODIUM SERPL-SCNC: 146 MMOL/L (ref 136–146)
TIBC SERPL-MCNC: 317 UG/DL (ref 250–450)
TSH, 3RD GENERATION: 1.54 UIU/ML (ref 0.36–3.74)
URATE SERPL-MCNC: 6.9 MG/DL (ref 2.6–6)
WBC # BLD AUTO: 10.5 K/UL (ref 4.3–11.1)

## 2024-03-01 PROCEDURE — 99214 OFFICE O/P EST MOD 30 MIN: CPT | Performed by: NURSE PRACTITIONER

## 2024-03-01 PROCEDURE — 3079F DIAST BP 80-89 MM HG: CPT | Performed by: NURSE PRACTITIONER

## 2024-03-01 PROCEDURE — 1124F ACP DISCUSS-NO DSCNMKR DOCD: CPT | Performed by: NURSE PRACTITIONER

## 2024-03-01 PROCEDURE — 3077F SYST BP >= 140 MM HG: CPT | Performed by: NURSE PRACTITIONER

## 2024-03-01 PROCEDURE — 3044F HG A1C LEVEL LT 7.0%: CPT | Performed by: NURSE PRACTITIONER

## 2024-03-01 RX ORDER — MEDROXYPROGESTERONE ACETATE 150 MG/ML
INJECTION, SUSPENSION INTRAMUSCULAR
COMMUNITY
Start: 2023-01-12

## 2024-03-01 RX ORDER — NALOXONE HYDROCHLORIDE 4 MG/.1ML
SPRAY NASAL
COMMUNITY

## 2024-03-01 ASSESSMENT — PATIENT HEALTH QUESTIONNAIRE - PHQ9
2. FEELING DOWN, DEPRESSED OR HOPELESS: NOT AT ALL
SUM OF ALL RESPONSES TO PHQ QUESTIONS 1-9: 0
SUM OF ALL RESPONSES TO PHQ QUESTIONS 1-9: 0
SUM OF ALL RESPONSES TO PHQ9 QUESTIONS 1 & 2: 0
SUM OF ALL RESPONSES TO PHQ QUESTIONS 1-9: 0
SUM OF ALL RESPONSES TO PHQ QUESTIONS 1-9: 0
1. LITTLE INTEREST OR PLEASURE IN DOING THINGS: NOT AT ALL

## 2024-03-01 NOTE — PROGRESS NOTES
PROGRESS NOTE    SUBJECTIVE:   Ney Allen is a 78 y.o. male seen for a follow up visit for   Chief Complaint   Patient presents with    Diabetes    Hypertension     HPI  Diabetes  Onset: 2002. Treatments tried: Now on Tresiba U-100 and metformin. The treatment provided moderate relief.  He reports blood sugars fasting are typically 100's to 130's.  post-meal blood glucose 160-170.  He only tests his blood sugar about 3 times per week.  Diabetes complications include recent left first toe amputation secondary to diabetic toe/foot.     Hypertension   Onset: 2000.  Agents associated with hypertension include steroids. Risk factors include family history, diabetes mellitus, obesity, male gender, hypertension and smoking/tobacco exposure.      Anemia  Pertinent negatives include no chest pain, no headaches and no shortness of breath.      Rheumatoid arthritis  Onset: > 10 years ago.  He has persistent joint pain and stiffness and spite of treatment.  Treatments tried: Prednisone, Arava and adalimumab.  He has been on Humira in the past.       Past Medical History:   Diagnosis Date    Anemia, unspecified     Arthritis     RA; spine/ general joints    Diabetes (HCC) dx 1995    Type 2 po and insulin hgba1c 6.2 on 11/28/2023 No s/sx of hypo states av fbs 100-130    Hypertension     well controlled with meds    Iron deficiency anemia secondary to inadequate dietary iron intake 03/06/2015    Open wound of lower extremity 03/11/2020    Rash and other nonspecific skin eruption     Rheumatoid arthritis(714.0) 03/06/2015    Statin intolerance 01/06/2017    Type II diabetes mellitus, uncontrolled 08/17/2015    Unspecified essential hypertension 03/09/2015        Past Surgical History:   Procedure Laterality Date    CERVICAL DISCECTOMY  11/30/2012    C3-C4;  Dr. Bernal    COLONOSCOPY      HEENT  age 14    R eye prosthesis    TOE AMPUTATION Left 1/31/2024    LEFT 1ST TOE AMPUTATION performed by Jonatan Pineda MD

## 2024-03-04 NOTE — RESULT ENCOUNTER NOTE
Patient could not come to the phone, but talked with wife to relay Agnieszka's message as follows:    Your hemoglobin A1c is 6.1% which is excellent.  The blood sugar are great control.  The uric acid is 6.9.  Recommend increase water consumption by 2 glasses/day.  Your blood count shows your hemoglobin is 10.5 and hematocrit 34.8 which has increased a little bit from your previous measurement 3 months ago.  The vitamin D level is 47.3 which is in the target range.  The ferritin and transferrin saturation show your iron level has increased a little.  The thyroid function test is 1.540 which is within normal limits.    Patient will call office if there are any questions.

## 2024-03-14 ENCOUNTER — OFFICE VISIT (OUTPATIENT)
Dept: VASCULAR SURGERY | Age: 79
End: 2024-03-14

## 2024-03-14 VITALS
DIASTOLIC BLOOD PRESSURE: 75 MMHG | OXYGEN SATURATION: 95 % | HEART RATE: 91 BPM | SYSTOLIC BLOOD PRESSURE: 143 MMHG | HEIGHT: 69 IN | WEIGHT: 198 LBS | BODY MASS INDEX: 29.33 KG/M2

## 2024-03-14 DIAGNOSIS — S98.112A AMPUTATION OF LEFT GREAT TOE (HCC): Primary | ICD-10-CM

## 2024-03-14 PROCEDURE — 99024 POSTOP FOLLOW-UP VISIT: CPT | Performed by: NURSE PRACTITIONER

## 2024-03-14 NOTE — PROGRESS NOTES
palpable radial and brachial pulses.  Lower extremities: left great toe amputation site moist. Dry gangrene to the right 2nd toe.   NEURO: sensation and strength grossly intact and symmetrical  PSYCH: alert and oriented to person, place and time      Assessment/Plan:Patient is a 78 year old male who follows up for his left great toe amputation site. Amputation site appears moist. I have asked him paint his incision line with betadine and change dressing BID to avoid having a moist dressing on the amputation site. Return in 2 weeks, sooner should any worsening issues arise.         Ros Harrell, APRN - CNP    20 minutes of time was spent on this encounter including chart review, assessment and evaluation

## 2024-03-19 ASSESSMENT — ENCOUNTER SYMPTOMS
ABDOMINAL PAIN: 0
SHORTNESS OF BREATH: 0
CHEST TIGHTNESS: 0
BACK PAIN: 1

## 2024-04-01 ENCOUNTER — OFFICE VISIT (OUTPATIENT)
Dept: VASCULAR SURGERY | Age: 79
End: 2024-04-01

## 2024-04-01 VITALS
DIASTOLIC BLOOD PRESSURE: 71 MMHG | SYSTOLIC BLOOD PRESSURE: 177 MMHG | BODY MASS INDEX: 29.47 KG/M2 | HEART RATE: 100 BPM | OXYGEN SATURATION: 95 % | HEIGHT: 69 IN | WEIGHT: 199 LBS

## 2024-04-01 DIAGNOSIS — I73.9 PAD (PERIPHERAL ARTERY DISEASE) (HCC): ICD-10-CM

## 2024-04-01 DIAGNOSIS — S98.112A AMPUTATION OF LEFT GREAT TOE (HCC): Primary | ICD-10-CM

## 2024-04-01 PROCEDURE — 99024 POSTOP FOLLOW-UP VISIT: CPT | Performed by: NURSE PRACTITIONER

## 2024-04-01 NOTE — PROGRESS NOTES
DATE OF VISIT: 4/1/2024      [facility]  Ney Allen is a 78 y.o. male who follows-up for his left great toe amputation. He denies any N/V/F/D.            MEDICAL HISTORY:   Past Medical History:   Diagnosis Date    Anemia, unspecified     Arthritis     RA; spine/ general joints    Diabetes (HCC) dx 1995    Type 2 po and insulin hgba1c 6.2 on 11/28/2023 No s/sx of hypo states av fbs 100-130    Hypertension     well controlled with meds    Iron deficiency anemia secondary to inadequate dietary iron intake 03/06/2015    Open wound of lower extremity 03/11/2020    Rash and other nonspecific skin eruption     Rheumatoid arthritis(714.0) 03/06/2015    Statin intolerance 01/06/2017    Type II diabetes mellitus, uncontrolled 08/17/2015    Unspecified essential hypertension 03/09/2015         SURGICAL HISTORY:   Past Surgical History:   Procedure Laterality Date    CERVICAL DISCECTOMY  11/30/2012    C3-C4;  Dr. Bernal    COLONOSCOPY      HEENT  age 14    R eye prosthesis    TOE AMPUTATION Left 1/31/2024    LEFT 1ST TOE AMPUTATION performed by Jonatan Pineda MD at [facility] MAIN OR       Review of Systems:  Constitutional:   Negative for fevers and unexplained weight loss.  Respiratory:   Negative for hemoptysis.  Cardiovascular:   Negative except as noted in HPI.  Musculoskeletal:  Negative for active, unexplained/severe joint pain.      ALLERGIES:   Allergies   Allergen Reactions    Atorvastatin Other (See Comments)    Fluvastatin Other (See Comments)    Hydroxychloroquine Other (See Comments)       CURRENT MEDICATIONS:  Current Outpatient Medications   Medication Sig Dispense Refill    Etanercept (ENBREL SURECLICK) 50 MG/ML SOAJ as directed Subcutaneous once weekly for 30 day(s)      naloxone 4 MG/0.1ML LIQD nasal spray AS DIRECTED NASALLY ONCE FOR 1 DAYS Nasal for 1 Days      olmesartan-hydroCHLOROthiazide (BENICAR HCT) 40-25 MG per tablet Take 1 tablet by mouth daily      predniSONE (DELTASONE) 5 MG

## 2024-04-29 ENCOUNTER — OFFICE VISIT (OUTPATIENT)
Dept: VASCULAR SURGERY | Age: 79
End: 2024-04-29

## 2024-04-29 VITALS
DIASTOLIC BLOOD PRESSURE: 92 MMHG | OXYGEN SATURATION: 93 % | BODY MASS INDEX: 29.92 KG/M2 | SYSTOLIC BLOOD PRESSURE: 194 MMHG | WEIGHT: 202 LBS | HEART RATE: 123 BPM | HEIGHT: 69 IN

## 2024-04-29 DIAGNOSIS — S98.119A AMPUTATED GREAT TOE, UNSPECIFIED LATERALITY (HCC): Primary | ICD-10-CM

## 2024-04-29 PROCEDURE — 99024 POSTOP FOLLOW-UP VISIT: CPT | Performed by: NURSE PRACTITIONER

## 2024-04-29 NOTE — PROGRESS NOTES
DATE OF VISIT: 4/29/2024      [facility]  Ney Allen is a 78 y.o. male who follows-up for his left great toe amputation. He denies any N/V/F/D. Wounds seem to be improving.           MEDICAL HISTORY:   Past Medical History:   Diagnosis Date    Anemia, unspecified     Arthritis     RA; spine/ general joints    Diabetes (HCC) dx 1995    Type 2 po and insulin hgba1c 6.2 on 11/28/2023 No s/sx of hypo states av fbs 100-130    Hypertension     well controlled with meds    Iron deficiency anemia secondary to inadequate dietary iron intake 03/06/2015    Open wound of lower extremity 03/11/2020    Rash and other nonspecific skin eruption     Rheumatoid arthritis(714.0) 03/06/2015    Statin intolerance 01/06/2017    Type II diabetes mellitus, uncontrolled 08/17/2015    Unspecified essential hypertension 03/09/2015         SURGICAL HISTORY:   Past Surgical History:   Procedure Laterality Date    CERVICAL DISCECTOMY  11/30/2012    C3-C4;  Dr. Bernal    COLONOSCOPY      HEENT  age 14    R eye prosthesis    TOE AMPUTATION Left 1/31/2024    LEFT 1ST TOE AMPUTATION performed by Jonatan Pineda MD at [facility] MAIN OR       Review of Systems:  Constitutional:   Negative for fevers and unexplained weight loss.  Respiratory:   Negative for hemoptysis.  Cardiovascular:   Negative except as noted in HPI.  Musculoskeletal:  Negative for active, unexplained/severe joint pain.      ALLERGIES:   Allergies   Allergen Reactions    Atorvastatin Other (See Comments)    Fluvastatin Other (See Comments)    Hydroxychloroquine Other (See Comments)       CURRENT MEDICATIONS:  Current Outpatient Medications   Medication Sig Dispense Refill    Etanercept (ENBREL SURECLICK) 50 MG/ML SOAJ as directed Subcutaneous once weekly for 30 day(s)      naloxone 4 MG/0.1ML LIQD nasal spray AS DIRECTED NASALLY ONCE FOR 1 DAYS Nasal for 1 Days      olmesartan-hydroCHLOROthiazide (BENICAR HCT) 40-25 MG per tablet Take 1 tablet by mouth daily

## 2024-05-28 RX ORDER — OLMESARTAN MEDOXOMIL AND HYDROCHLOROTHIAZIDE 40/25 40; 25 MG/1; MG/1
1 TABLET ORAL DAILY
Qty: 90 TABLET | Refills: 3 | OUTPATIENT
Start: 2024-05-28

## 2024-06-03 NOTE — TELEPHONE ENCOUNTER
Patients' wife Maria Guadalupe calling requesting refill of Olmesartan 40-25 mg 1x daily sent to [facility] on [address]    LOV: 3/1/24  NOV: 7/2/24

## 2024-06-04 RX ORDER — OLMESARTAN MEDOXOMIL AND HYDROCHLOROTHIAZIDE 40/25 40; 25 MG/1; MG/1
1 TABLET ORAL DAILY
Qty: 90 TABLET | Refills: 0 | Status: SHIPPED | OUTPATIENT
Start: 2024-06-04

## 2024-07-02 ENCOUNTER — OFFICE VISIT (OUTPATIENT)
Dept: INTERNAL MEDICINE CLINIC | Facility: CLINIC | Age: 79
End: 2024-07-02
Payer: MEDICARE

## 2024-07-02 VITALS
OXYGEN SATURATION: 94 % | DIASTOLIC BLOOD PRESSURE: 74 MMHG | WEIGHT: 199 LBS | HEART RATE: 84 BPM | BODY MASS INDEX: 28.49 KG/M2 | RESPIRATION RATE: 14 BRPM | SYSTOLIC BLOOD PRESSURE: 152 MMHG | HEIGHT: 70 IN

## 2024-07-02 DIAGNOSIS — I10 ESSENTIAL HYPERTENSION: ICD-10-CM

## 2024-07-02 DIAGNOSIS — E11.621 DIABETIC ULCER OF TOE OF RIGHT FOOT ASSOCIATED WITH TYPE 2 DIABETES MELLITUS, UNSPECIFIED ULCER STAGE (HCC): ICD-10-CM

## 2024-07-02 DIAGNOSIS — L97.519 DIABETIC ULCER OF TOE OF RIGHT FOOT ASSOCIATED WITH TYPE 2 DIABETES MELLITUS, UNSPECIFIED ULCER STAGE (HCC): ICD-10-CM

## 2024-07-02 DIAGNOSIS — E11.40 TYPE 2 DIABETES MELLITUS WITH DIABETIC NEUROPATHY, WITH LONG-TERM CURRENT USE OF INSULIN (HCC): ICD-10-CM

## 2024-07-02 DIAGNOSIS — N40.1 BENIGN PROSTATIC HYPERPLASIA WITH LOWER URINARY TRACT SYMPTOMS, SYMPTOM DETAILS UNSPECIFIED: ICD-10-CM

## 2024-07-02 DIAGNOSIS — Z79.4 TYPE 2 DIABETES MELLITUS WITH DIABETIC NEUROPATHY, WITH LONG-TERM CURRENT USE OF INSULIN (HCC): ICD-10-CM

## 2024-07-02 DIAGNOSIS — M06.09 RHEUMATOID ARTHRITIS OF MULTIPLE SITES WITH NEGATIVE RHEUMATOID FACTOR (HCC): ICD-10-CM

## 2024-07-02 LAB
ALBUMIN SERPL-MCNC: 3.9 G/DL (ref 3.2–4.6)
ALBUMIN/GLOB SERPL: 1.2 (ref 1–1.9)
ALP SERPL-CCNC: 64 U/L (ref 40–129)
ALT SERPL-CCNC: 13 U/L (ref 12–65)
ANION GAP SERPL CALC-SCNC: 16 MMOL/L (ref 9–18)
AST SERPL-CCNC: 22 U/L (ref 15–37)
BILIRUB SERPL-MCNC: <0.2 MG/DL (ref 0–1.2)
BUN SERPL-MCNC: 26 MG/DL (ref 8–23)
CALCIUM SERPL-MCNC: 9.2 MG/DL (ref 8.8–10.2)
CHLORIDE SERPL-SCNC: 101 MMOL/L (ref 98–107)
CO2 SERPL-SCNC: 25 MMOL/L (ref 20–28)
CREAT SERPL-MCNC: 1.48 MG/DL (ref 0.8–1.3)
EST. AVERAGE GLUCOSE BLD GHB EST-MCNC: 168 MG/DL
GLOBULIN SER CALC-MCNC: 3.4 G/DL (ref 2.3–3.5)
GLUCOSE SERPL-MCNC: 126 MG/DL (ref 70–99)
HBA1C MFR BLD: 7.5 % (ref 0–5.6)
POTASSIUM SERPL-SCNC: 3.8 MMOL/L (ref 3.5–5.1)
PROT SERPL-MCNC: 7.3 G/DL (ref 6.3–8.2)
SODIUM SERPL-SCNC: 143 MMOL/L (ref 136–145)
TSH, 3RD GENERATION: 2.14 UIU/ML (ref 0.27–4.2)
URATE SERPL-MCNC: 7.7 MG/DL (ref 3.9–8.2)

## 2024-07-02 PROCEDURE — 99214 OFFICE O/P EST MOD 30 MIN: CPT | Performed by: NURSE PRACTITIONER

## 2024-07-02 PROCEDURE — 3051F HG A1C>EQUAL 7.0%<8.0%: CPT | Performed by: NURSE PRACTITIONER

## 2024-07-02 PROCEDURE — 1124F ACP DISCUSS-NO DSCNMKR DOCD: CPT | Performed by: NURSE PRACTITIONER

## 2024-07-02 PROCEDURE — 3077F SYST BP >= 140 MM HG: CPT | Performed by: NURSE PRACTITIONER

## 2024-07-02 PROCEDURE — 3078F DIAST BP <80 MM HG: CPT | Performed by: NURSE PRACTITIONER

## 2024-07-02 RX ORDER — VERAPAMIL HYDROCHLORIDE 240 MG/1
240 CAPSULE, EXTENDED RELEASE ORAL DAILY
Qty: 90 CAPSULE | Refills: 3 | Status: SHIPPED | OUTPATIENT
Start: 2024-07-02

## 2024-07-02 RX ORDER — INSULIN DEGLUDEC 200 U/ML
INJECTION, SOLUTION SUBCUTANEOUS
Qty: 10 ADJUSTABLE DOSE PRE-FILLED PEN SYRINGE | Refills: 11 | Status: SHIPPED | OUTPATIENT
Start: 2024-07-02

## 2024-07-02 RX ORDER — VERAPAMIL HYDROCHLORIDE 180 MG/1
180 CAPSULE, EXTENDED RELEASE ORAL DAILY
Qty: 90 CAPSULE | Refills: 3 | Status: CANCELLED | OUTPATIENT
Start: 2024-07-02

## 2024-07-02 RX ORDER — OLMESARTAN MEDOXOMIL AND HYDROCHLOROTHIAZIDE 40/25 40; 25 MG/1; MG/1
1 TABLET ORAL DAILY
Qty: 90 TABLET | Refills: 3 | Status: SHIPPED | OUTPATIENT
Start: 2024-07-02

## 2024-07-02 RX ORDER — PREDNISONE 5 MG/1
10 TABLET ORAL DAILY
Qty: 180 TABLET | Refills: 3 | Status: SHIPPED | OUTPATIENT
Start: 2024-07-02

## 2024-07-02 RX ORDER — TAMSULOSIN HYDROCHLORIDE 0.4 MG/1
0.4 CAPSULE ORAL DAILY
Qty: 90 CAPSULE | Refills: 3 | Status: SHIPPED | OUTPATIENT
Start: 2024-07-02

## 2024-07-02 NOTE — PROGRESS NOTES
PROGRESS NOTE    SUBJECTIVE:   Ney Allen is a 78 y.o. male seen for a follow up visit for   Chief Complaint   Patient presents with    Hypertension     4 month f/u, discuss labs     HPI    Diabetes  Onset: 2002. Treatments tried: Now on Tresiba U-100 and metformin. The treatment provided moderate relief.  He reports blood sugars fasting are typically 100's to 130's.  post-meal blood glucose 160-170.  He only tests his blood sugar about 3 times per week.  Diabetes complications include recent left first toe amputation secondary to diabetic toe/foot.     Hypertension   Onset: 2000.  Agents associated with hypertension include steroids. Risk factors include family history, diabetes mellitus, obesity, male gender, hypertension and smoking/tobacco exposure.      Anemia  Pertinent negatives include no chest pain, no headaches and no shortness of breath.      Rheumatoid arthritis  Onset: > 10 years ago.  He has persistent joint pain and stiffness and spite of treatment.  Treatments tried: Prednisone, Arava and adalimumab.  He has been on Humira in the past.       Past Medical History:   Diagnosis Date    Anemia, unspecified     Arthritis     RA; spine/ general joints    Diabetes (HCC) dx 1995    Type 2 po and insulin hgba1c 6.2 on 11/28/2023 No s/sx of hypo states av fbs 100-130    Hypertension     well controlled with meds    Iron deficiency anemia secondary to inadequate dietary iron intake 03/06/2015    Open wound of lower extremity 03/11/2020    Rash and other nonspecific skin eruption     Rheumatoid arthritis(714.0) 03/06/2015    Statin intolerance 01/06/2017    Type II diabetes mellitus, uncontrolled 08/17/2015    Unspecified essential hypertension 03/09/2015        Past Surgical History:   Procedure Laterality Date    CERVICAL DISCECTOMY  11/30/2012    C3-C4;  Dr. Bernal    COLONOSCOPY      HEENT  age 14    R eye prosthesis    TOE AMPUTATION Left 1/31/2024    LEFT 1ST TOE AMPUTATION performed by [facility]

## 2024-07-20 DIAGNOSIS — I10 ESSENTIAL HYPERTENSION: ICD-10-CM

## 2024-07-22 RX ORDER — VERAPAMIL HYDROCHLORIDE 180 MG/1
CAPSULE, EXTENDED RELEASE ORAL DAILY
Qty: 90 CAPSULE | Refills: 3 | OUTPATIENT
Start: 2024-07-22

## 2024-07-25 ENCOUNTER — PATIENT MESSAGE (OUTPATIENT)
Dept: INTERNAL MEDICINE CLINIC | Facility: CLINIC | Age: 79
End: 2024-07-25

## 2024-07-27 DIAGNOSIS — I10 ESSENTIAL HYPERTENSION: ICD-10-CM

## 2024-07-29 RX ORDER — VERAPAMIL HYDROCHLORIDE 180 MG/1
CAPSULE, EXTENDED RELEASE ORAL DAILY
Qty: 90 CAPSULE | Refills: 3 | OUTPATIENT
Start: 2024-07-29

## 2024-07-30 ENCOUNTER — OFFICE VISIT (OUTPATIENT)
Dept: NEUROSURGERY | Age: 79
End: 2024-07-30
Payer: MEDICARE

## 2024-07-30 VITALS
BODY MASS INDEX: 27.32 KG/M2 | HEART RATE: 100 BPM | SYSTOLIC BLOOD PRESSURE: 178 MMHG | DIASTOLIC BLOOD PRESSURE: 83 MMHG | TEMPERATURE: 98.1 F | WEIGHT: 190.8 LBS | HEIGHT: 70 IN | OXYGEN SATURATION: 94 %

## 2024-07-30 DIAGNOSIS — M48.062 SPINAL STENOSIS OF LUMBAR REGION WITH NEUROGENIC CLAUDICATION: ICD-10-CM

## 2024-07-30 DIAGNOSIS — M48.062 SPINAL STENOSIS, LUMBAR REGION, WITH NEUROGENIC CLAUDICATION: Primary | ICD-10-CM

## 2024-07-30 PROCEDURE — 3077F SYST BP >= 140 MM HG: CPT | Performed by: NEUROLOGICAL SURGERY

## 2024-07-30 PROCEDURE — 99214 OFFICE O/P EST MOD 30 MIN: CPT | Performed by: NEUROLOGICAL SURGERY

## 2024-07-30 PROCEDURE — 1124F ACP DISCUSS-NO DSCNMKR DOCD: CPT | Performed by: NEUROLOGICAL SURGERY

## 2024-07-30 PROCEDURE — 3079F DIAST BP 80-89 MM HG: CPT | Performed by: NEUROLOGICAL SURGERY

## 2024-07-30 ASSESSMENT — ENCOUNTER SYMPTOMS
RESPIRATORY NEGATIVE: 1
EYES NEGATIVE: 1
ALLERGIC/IMMUNOLOGIC NEGATIVE: 1
BACK PAIN: 1
GASTROINTESTINAL NEGATIVE: 1

## 2024-07-30 NOTE — PROGRESS NOTES
[facility] OFFICE NOTE:         CC: Routine follow-up for  follow-up and consider rescheduling surgery    History of Present Illness:  Ney Allen (1945) is a 78 y.o.  male who was last seen in the office 1/9/2024  for getting set up for lumbar decompression L3-4 L4-5 but the surgery was canceled due to an evolving diabetic foot ulcer.  They return to the office today for  follow-up and discuss proceeding with surgery. .  Since his last visit he had failed local control for his diabetic ulcer and ended up having a toe amputation.  That is all healed and he has been released.  He states that in the interim his legs have gotten so profoundly weak especially proximally he states that he has trouble pushing up from a seated position and has significant weakness in his upper legs.    HPI      Past Medical History:   Diagnosis Date    Anemia, unspecified     Arthritis     RA; spine/ general joints    Diabetes (HCC) dx 1995    Type 2 po and insulin hgba1c 6.2 on 11/28/2023 No s/sx of hypo states av fbs 100-130    Hypertension     well controlled with meds    Iron deficiency anemia secondary to inadequate dietary iron intake 03/06/2015    Open wound of lower extremity 03/11/2020    Rash and other nonspecific skin eruption     Rheumatoid arthritis(714.0) 03/06/2015    Statin intolerance 01/06/2017    Type II diabetes mellitus, uncontrolled 08/17/2015    Unspecified essential hypertension 03/09/2015      Past Surgical History:   Procedure Laterality Date    CERVICAL DISCECTOMY  11/30/2012    C3-C4;  Dr. Benral    COLONOSCOPY      HEENT  age 14    R eye prosthesis    TOE AMPUTATION Left 1/31/2024    LEFT 1ST TOE AMPUTATION performed by Jonatan Pineda MD at [facility] MAIN OR     Current Outpatient Medications   Medication Sig Dispense Refill    Calcium Carb-Cholecalciferol 600-12.5 MG-MCG CAPS 1 po bid      Insulin Degludec (TRESIBA FLEXTOUCH) 200 UNIT/ML SOPN Inject 40 units into

## 2024-07-31 NOTE — ASSESSMENT & PLAN NOTE
We finally gotten his diabetic foot ulcer to heal after having a toe amputation.  I am concerned of how much worse his proximal weakness is.  His last MRI was almost a year ago and he had some stenosis at L1-2 which is much worse at L3-4 and L4-5.  Before we make plans to just move forward with the previously planned L3-4 L4-5 decompression I would like to get an MRI so we get a better look at L1-2.  Will see him back after that.

## 2024-08-08 ENCOUNTER — OFFICE VISIT (OUTPATIENT)
Dept: INTERNAL MEDICINE CLINIC | Facility: CLINIC | Age: 79
End: 2024-08-08
Payer: MEDICARE

## 2024-08-08 VITALS
DIASTOLIC BLOOD PRESSURE: 70 MMHG | SYSTOLIC BLOOD PRESSURE: 142 MMHG | BODY MASS INDEX: 28.41 KG/M2 | OXYGEN SATURATION: 92 % | WEIGHT: 198 LBS | HEART RATE: 55 BPM

## 2024-08-08 DIAGNOSIS — L97.519 DIABETIC ULCER OF TOE OF RIGHT FOOT ASSOCIATED WITH TYPE 2 DIABETES MELLITUS, UNSPECIFIED ULCER STAGE (HCC): ICD-10-CM

## 2024-08-08 DIAGNOSIS — Z79.4 TYPE 2 DIABETES MELLITUS WITH DIABETIC NEUROPATHY, WITH LONG-TERM CURRENT USE OF INSULIN (HCC): Primary | ICD-10-CM

## 2024-08-08 DIAGNOSIS — N18.30 STAGE 3 CHRONIC KIDNEY DISEASE, UNSPECIFIED WHETHER STAGE 3A OR 3B CKD (HCC): ICD-10-CM

## 2024-08-08 DIAGNOSIS — E11.621 DIABETIC ULCER OF TOE OF RIGHT FOOT ASSOCIATED WITH TYPE 2 DIABETES MELLITUS, UNSPECIFIED ULCER STAGE (HCC): ICD-10-CM

## 2024-08-08 DIAGNOSIS — M06.09 RHEUMATOID ARTHRITIS OF MULTIPLE SITES WITH NEGATIVE RHEUMATOID FACTOR (HCC): ICD-10-CM

## 2024-08-08 DIAGNOSIS — E11.40 TYPE 2 DIABETES MELLITUS WITH DIABETIC NEUROPATHY, WITH LONG-TERM CURRENT USE OF INSULIN (HCC): Primary | ICD-10-CM

## 2024-08-08 PROCEDURE — 3077F SYST BP >= 140 MM HG: CPT | Performed by: NURSE PRACTITIONER

## 2024-08-08 PROCEDURE — 3051F HG A1C>EQUAL 7.0%<8.0%: CPT | Performed by: NURSE PRACTITIONER

## 2024-08-08 PROCEDURE — 99213 OFFICE O/P EST LOW 20 MIN: CPT | Performed by: NURSE PRACTITIONER

## 2024-08-08 PROCEDURE — 1124F ACP DISCUSS-NO DSCNMKR DOCD: CPT | Performed by: NURSE PRACTITIONER

## 2024-08-08 PROCEDURE — 3078F DIAST BP <80 MM HG: CPT | Performed by: NURSE PRACTITIONER

## 2024-08-08 NOTE — PROGRESS NOTES
PROGRESS NOTE    SUBJECTIVE:   eNy Allen is a 78 y.o. male seen for a follow up visit for   Chief Complaint   Patient presents with    Diabetes    Hypertension     HPI  Diabetes  Onset: 2002. Treatments tried: Now on Tresiba U-100 and metformin. The treatment provided moderate relief.  He reports blood sugars fasting are typically 100's to 130's.  post-meal blood glucose 160-170.  He only tests his blood sugar about 3 times per week.  Diabetes complications include recent left first toe amputation secondary to diabetic toe/foot.     Hypertension   Onset: 2000.  Agents associated with hypertension include steroids. Risk factors include family history, diabetes mellitus, obesity, male gender, hypertension and smoking/tobacco exposure.      Anemia  Pertinent negatives include no chest pain, no headaches and no shortness of breath.      Rheumatoid arthritis  Onset: > 10 years ago.  He has persistent joint pain and stiffness and spite of treatment.  Treatments tried: Prednisone, Arava and adalimumab.  He has been on Humira in the past.       Past Medical History:   Diagnosis Date    Anemia, unspecified     Arthritis     RA; spine/ general joints    Diabetes (HCC) dx 1995    Type 2 po and insulin hgba1c 6.2 on 11/28/2023 No s/sx of hypo states av fbs 100-130    Hypertension     well controlled with meds    Iron deficiency anemia secondary to inadequate dietary iron intake 03/06/2015    Open wound of lower extremity 03/11/2020    Rash and other nonspecific skin eruption     Rheumatoid arthritis(714.0) 03/06/2015    Statin intolerance 01/06/2017    Type II diabetes mellitus, uncontrolled 08/17/2015    Unspecified essential hypertension 03/09/2015        Past Surgical History:   Procedure Laterality Date    CERVICAL DISCECTOMY  11/30/2012    C3-C4;  Dr. Bernal    COLONOSCOPY      HEENT  age 14    R eye prosthesis    TOE AMPUTATION Left 1/31/2024    LEFT 1ST TOE AMPUTATION performed by Jonatan Pineda MD

## 2024-08-08 NOTE — PATIENT INSTRUCTIONS
Patient assistance forms for Tresiba :    [email or web address]

## 2024-08-09 ENCOUNTER — TELEPHONE (OUTPATIENT)
Dept: INTERNAL MEDICINE CLINIC | Facility: CLINIC | Age: 79
End: 2024-08-09

## 2024-08-09 NOTE — TELEPHONE ENCOUNTER
Per Agnieszka Manzano, pt spouse notified that there are samples of Tresiba at the nurse station refrigerator for him to . She said that she will let him know.

## 2024-08-14 ENCOUNTER — HOSPITAL ENCOUNTER (OUTPATIENT)
Dept: MRI IMAGING | Age: 79
Discharge: HOME | End: 2024-08-17
Attending: NEUROLOGICAL SURGERY
Payer: MEDICARE

## 2024-08-14 DIAGNOSIS — M48.062 SPINAL STENOSIS, LUMBAR REGION, WITH NEUROGENIC CLAUDICATION: ICD-10-CM

## 2024-08-14 DIAGNOSIS — M48.062 SPINAL STENOSIS OF LUMBAR REGION WITH NEUROGENIC CLAUDICATION: ICD-10-CM

## 2024-08-14 PROCEDURE — 72148 MRI LUMBAR SPINE W/O DYE: CPT

## 2024-08-19 ENCOUNTER — CARE COORDINATION (OUTPATIENT)
Dept: CARE COORDINATION | Facility: CLINIC | Age: 79
End: 2024-08-19

## 2024-08-19 NOTE — CARE COORDINATION
Chart reviewed. NATALIE-BREE will discuss with ambulatory pharmacy and RN care management for next steps.

## 2024-08-20 ENCOUNTER — TELEPHONE (OUTPATIENT)
Dept: INTERNAL MEDICINE CLINIC | Facility: CLINIC | Age: 79
End: 2024-08-20

## 2024-08-20 NOTE — TELEPHONE ENCOUNTER
Called pt to adv his [employer] Pt assist application was completed by Agnieszka and he can come by to pick them up, unable to leave vm as pt vm is full. Per Agnieszka, pt may also need tax forms with this.

## 2024-08-23 ENCOUNTER — CARE COORDINATION (OUTPATIENT)
Dept: CARE COORDINATION | Facility: CLINIC | Age: 79
End: 2024-08-23

## 2024-08-23 NOTE — CARE COORDINATION
Patient's wife ask that I call back on Monday morning to discuss medication prescription assistance. Patient not home at this time.

## 2024-08-27 DIAGNOSIS — I10 ESSENTIAL HYPERTENSION: ICD-10-CM

## 2024-08-27 RX ORDER — VERAPAMIL HYDROCHLORIDE 180 MG/1
CAPSULE, EXTENDED RELEASE ORAL DAILY
Qty: 90 CAPSULE | Refills: 3 | OUTPATIENT
Start: 2024-08-27

## 2024-10-02 DIAGNOSIS — Z79.4 TYPE 2 DIABETES MELLITUS WITH DIABETIC NEUROPATHY, WITH LONG-TERM CURRENT USE OF INSULIN (HCC): ICD-10-CM

## 2024-10-02 DIAGNOSIS — E11.40 TYPE 2 DIABETES MELLITUS WITH DIABETIC NEUROPATHY, WITH LONG-TERM CURRENT USE OF INSULIN (HCC): ICD-10-CM

## 2024-10-02 RX ORDER — INSULIN DEGLUDEC 200 U/ML
INJECTION, SOLUTION SUBCUTANEOUS
Refills: 5 | OUTPATIENT
Start: 2024-10-02

## 2024-10-20 DIAGNOSIS — M06.09 RHEUMATOID ARTHRITIS OF MULTIPLE SITES WITH NEGATIVE RHEUMATOID FACTOR (HCC): ICD-10-CM

## 2024-10-21 RX ORDER — PREDNISONE 5 MG/1
10 TABLET ORAL DAILY
Qty: 180 TABLET | Refills: 3 | OUTPATIENT
Start: 2024-10-21

## 2024-10-29 ENCOUNTER — OFFICE VISIT (OUTPATIENT)
Dept: NEUROSURGERY | Age: 79
End: 2024-10-29
Payer: MEDICARE

## 2024-10-29 VITALS
SYSTOLIC BLOOD PRESSURE: 157 MMHG | HEIGHT: 70 IN | DIASTOLIC BLOOD PRESSURE: 63 MMHG | HEART RATE: 95 BPM | BODY MASS INDEX: 28.41 KG/M2 | TEMPERATURE: 97.9 F | OXYGEN SATURATION: 96 %

## 2024-10-29 DIAGNOSIS — M48.062 SPINAL STENOSIS OF LUMBAR REGION WITH NEUROGENIC CLAUDICATION: Primary | ICD-10-CM

## 2024-10-29 PROCEDURE — 1159F MED LIST DOCD IN RCRD: CPT | Performed by: NEUROLOGICAL SURGERY

## 2024-10-29 PROCEDURE — 1124F ACP DISCUSS-NO DSCNMKR DOCD: CPT | Performed by: NEUROLOGICAL SURGERY

## 2024-10-29 PROCEDURE — 1125F AMNT PAIN NOTED PAIN PRSNT: CPT | Performed by: NEUROLOGICAL SURGERY

## 2024-10-29 PROCEDURE — 99214 OFFICE O/P EST MOD 30 MIN: CPT | Performed by: NEUROLOGICAL SURGERY

## 2024-10-29 PROCEDURE — 3078F DIAST BP <80 MM HG: CPT | Performed by: NEUROLOGICAL SURGERY

## 2024-10-29 PROCEDURE — 3077F SYST BP >= 140 MM HG: CPT | Performed by: NEUROLOGICAL SURGERY

## 2024-10-29 ASSESSMENT — ENCOUNTER SYMPTOMS: BACK PAIN: 1

## 2024-10-29 NOTE — PROGRESS NOTES
Eyes:      Pupils: Pupils are equal, round, and reactive to light.   Cardiovascular:      Rate and Rhythm: Normal rate and regular rhythm.   Pulmonary:      Effort: Pulmonary effort is normal. No respiratory distress.      Breath sounds: Normal breath sounds. No stridor.   Abdominal:      General: There is no distension.      Palpations: Abdomen is soft.      Tenderness: There is no abdominal tenderness. There is no rebound.   Skin:     General: Skin is warm and dry.      Coloration: Skin is not jaundiced.      Findings: No rash.   Neurological:      Mental Status: He is alert.      Cranial Nerves: No cranial nerve deficit.      Sensory: No sensory deficit.      Motor: Weakness (Severe lower extremity weakness worse than his last visit.) present.      Gait: Gait abnormal (Struggles to walk with a walker.  Takes significant effort to rise from the chair to use the walker).   Psychiatric:         Mood and Affect: Mood normal.         Behavior: Behavior normal.         Thought Content: Thought content normal.          Imaging: I personally reviewed:  The MRI of the  lumbosacral spine dated August 14, 2024 from  [facility] and compared with a previous study from September 2023. My review demonstrates his stenosis has progressively worsened in the year.  He has severe Schizas grade D stenosis at L3-4 grade C at L4-5      Problem List Items Addressed This Visit          Other    Spinal stenosis of lumbar region with neurogenic claudication - Primary     His weakness is worsened and his MRI correlates that with worsening stenosis worse at L3-4.  The plan will be to do an L4 laminectomy for decompression from L3-4 to L4-5.  I discussed the risk, benefits, limitations, options of intervention and the details of surgical intervention in great detail.  Answered all of their questions to their satisfaction.  We will move forward with scheduling for surgery.  Due to his age and his medical status we will plan to

## 2024-10-29 NOTE — ASSESSMENT & PLAN NOTE
His weakness is worsened and his MRI correlates that with worsening stenosis worse at L3-4.  The plan will be to do an L4 laminectomy for decompression from L3-4 to L4-5.  I discussed the risk, benefits, limitations, options of intervention and the details of surgical intervention in great detail.  Answered all of their questions to their satisfaction.  We will move forward with scheduling for surgery.  Due to his age and his medical status we will plan to admit him and make discharge plans based on his progress.

## 2024-11-04 ENCOUNTER — PREP FOR PROCEDURE (OUTPATIENT)
Dept: NEUROSURGERY | Age: 79
End: 2024-11-04

## 2024-11-07 ENCOUNTER — HOSPITAL ENCOUNTER (OUTPATIENT)
Dept: SURGERY | Age: 79
Discharge: HOME | End: 2024-11-10
Payer: MEDICARE

## 2024-11-07 VITALS
RESPIRATION RATE: 18 BRPM | SYSTOLIC BLOOD PRESSURE: 121 MMHG | WEIGHT: 201 LBS | TEMPERATURE: 97.3 F | HEIGHT: 70 IN | DIASTOLIC BLOOD PRESSURE: 52 MMHG | HEART RATE: 92 BPM | BODY MASS INDEX: 28.77 KG/M2

## 2024-11-07 LAB
CREAT SERPL-MCNC: 1.4 MG/DL (ref 0.8–1.3)
EST. AVERAGE GLUCOSE BLD GHB EST-MCNC: 132 MG/DL
GLUCOSE BLD STRIP.AUTO-MCNC: 161 MG/DL (ref 65–100)
HBA1C MFR BLD: 6.2 % (ref 0–5.6)
HGB BLD-MCNC: 9.6 G/DL (ref 13.6–17.2)
MRSA DNA SPEC QL NAA+PROBE: DETECTED
POTASSIUM SERPL-SCNC: 4 MMOL/L (ref 3.5–5.1)
S AUREUS CPE NOSE QL NAA+PROBE: DETECTED
SERVICE CMNT-IMP: ABNORMAL

## 2024-11-07 PROCEDURE — 83036 HEMOGLOBIN GLYCOSYLATED A1C: CPT

## 2024-11-07 PROCEDURE — 82565 ASSAY OF CREATININE: CPT

## 2024-11-07 PROCEDURE — 87641 MR-STAPH DNA AMP PROBE: CPT

## 2024-11-07 PROCEDURE — 84132 ASSAY OF SERUM POTASSIUM: CPT

## 2024-11-07 PROCEDURE — 85018 HEMOGLOBIN: CPT

## 2024-11-07 PROCEDURE — 82962 GLUCOSE BLOOD TEST: CPT

## 2024-11-07 ASSESSMENT — PAIN DESCRIPTION - PAIN TYPE: TYPE: CHRONIC PAIN

## 2024-11-07 ASSESSMENT — PAIN DESCRIPTION - LOCATION: LOCATION: BACK

## 2024-11-07 ASSESSMENT — PAIN DESCRIPTION - DESCRIPTORS: DESCRIPTORS: ACHING;NUMBNESS;TINGLING

## 2024-11-07 ASSESSMENT — PAIN SCALES - GENERAL: PAINLEVEL_OUTOF10: 7

## 2024-11-07 ASSESSMENT — PAIN - FUNCTIONAL ASSESSMENT: PAIN_FUNCTIONAL_ASSESSMENT: INTOLERABLE, UNABLE TO DO ANY ACTIVE OR PASSIVE ACTIVITIES

## 2024-11-07 ASSESSMENT — PAIN DESCRIPTION - DIRECTION: RADIATING_TOWARDS: LEGS

## 2024-11-07 ASSESSMENT — PAIN DESCRIPTION - FREQUENCY: FREQUENCY: CONTINUOUS

## 2024-11-07 NOTE — PRE-PROCEDURE INSTRUCTIONS
Patient verified name, , and surgery as listed in [facility]. Patient provided medical/health information and PTA medications to the best of their ability.    TYPE  CASE: 2  Orders per surgeon: were received per protocol  Labs per surgeon: hgb potassium, creat  and mrsa collected and results in [facility].   Labs per anesthesia protocol: POC glucose 161 . Instructed  Patient that if blood sugar 300 or > , surgery may be cancelled.   EKG:  in [facility] from 2023 and results wnl. Patient denies any hx of CAD, recent chest pain or SHAW     MRSA/MSSA swab collected; pharmacy to review and dose antibiotic as appropriate.     Patient provided with and instructed on education handouts including Guide to Surgery,  Preventing Infections, Pain Management, and [facility] brochure.     Instructed on incentive spirometry with return demonstration.     Surgical skin cleanser and instructions given per hospital policy.    Original medication prescription bottles not visualized during patient appointment.     Patient teach back successful and patient demonstrates knowledge of instruction.

## 2024-11-07 NOTE — PROGRESS NOTES
Pre-Assessment Surgery Review      Patient ID:  Ney Allen  [id number]  78 y.o.  1945  Surgeon: Dr Silverman  Date of Surgery: 11/22/2024  Procedure:  L4 laminectomy for decompression from L3-4 to L4-5    Primary Care Physician: Agnieszka Manzano, APRN - [phone]  Specialty Physician(s):      Subjective:   Ney Allen is a 78 y.o. Black /  male who presents for preoperative evaluation. This is a preoperative chart review note based on data collected by the nurse at the surgical Pre-Assessment visit.    Past Medical History:   Diagnosis Date    Anemia, unspecified     Arthritis     RA; spine/ general joints    Diabetes (HCC) dx 1995    Type 2 po and insulin hgba1c 6.2 on 11/28/2023 No s/sx of hypo states av fbs 100-130    Hypertension     well controlled with meds    Iron deficiency anemia secondary to inadequate dietary iron intake 03/06/2015    Open wound of lower extremity 03/11/2020    Rash and other nonspecific skin eruption     Rheumatoid arthritis(714.0) 03/06/2015    Statin intolerance 01/06/2017    Type II diabetes mellitus, uncontrolled 08/17/2015    Unspecified essential hypertension 03/09/2015      Past Surgical History:   Procedure Laterality Date    CERVICAL DISCECTOMY  11/30/2012    C3-C4;  Dr. Bernal    COLONOSCOPY      HEENT  age 14    R eye prosthesis    TOE AMPUTATION Left 1/31/2024    LEFT 1ST TOE AMPUTATION performed by Jonatan Pineda MD at [facility] MAIN OR     Family History   Problem Relation Age of Onset    Osteoarthritis Sister     Osteoarthritis Sister     Rheum Arthritis Mother     Cancer Father         Prostate CA    Cancer Sister         breast    Hypertension Brother     Diabetes Brother     Osteoarthritis Brother       Social History     Tobacco Use    Smoking status: Never    Smokeless tobacco: Former     Types: Chew     Quit date: 10/2023   Substance Use Topics    Alcohol use: No     Alcohol/week: 0.0 standard drinks of alcohol

## 2024-11-07 NOTE — PRE-PROCEDURE INSTRUCTIONS
PLEASE CONTINUE TAKING ALL PRESCRIPTION MEDICATIONS UP TO THE DAY OF SURGERY UNLESS OTHERWISE DIRECTED BELOW. You may take Tylenol, allergy,  and/or indigestion medications.     TAKE ONLY THESE MEDICATIONS ON THE DAY OF SURGERY    ARAVA  PREDNISONE  TRAMADOL IF NEEDED  VERAPAMIL      TAKE 30 UNITS OF TRESIBA INSULIN THE NIGHT PRIOR TO SURGERY     DISCONTINUE all vitamins and supplements 7 days prior to surgery. DISCONTINUE Non-Steroidal Anti-Inflammatory (NSAIDS) such as Advil and Aleve 5 days prior to surgery.     Home Medications to Hold- please continue all other medications except these.    HOLD ALL VITAMINS 7 DAYS PRIOR TO SURGERY   HOLD METFORMIN MORNING OF SURGERY    HOLD OLMESARTAN- HCTZ MORNING OF SURGERY     Comments   Please drink 32 ounces of non-caffeinated clear liquids 2 hours prior to your arrival to avoid dehydration.      On the day before surgery please take 2 Tylenol  OR TRAMADOL In the morning and then again before bed. You may use either regular or extra strength.           Please do not bring home medications with you on the day of surgery unless otherwise directed by your nurse.  If you are instructed to bring home medications, please give them to your nurse as they will be administered by the nursing staff.    If you have any questions, please call [facility] [phone].    A copy of this note was provided to the patient for reference.

## 2024-11-18 ENCOUNTER — TELEPHONE (OUTPATIENT)
Dept: NEUROSURGERY | Age: 79
End: 2024-11-18

## 2024-11-18 NOTE — TELEPHONE ENCOUNTER
Called to check auth status today, sx has been denied. Tried to call and sched peer to peer, was told the cut off was 11/15. Unsure how long this window is as I checked auth status 11/13 and was still pending. Having denial papers faxed to us, then will try to reschedule sx.

## 2024-11-20 ENCOUNTER — TELEPHONE (OUTPATIENT)
Dept: NEUROSURGERY | Age: 79
End: 2024-11-20

## 2024-11-20 NOTE — TELEPHONE ENCOUNTER
Swp wife abt insurance denial, she stated they are getting new insurance dec 7, will call back then to schedule surgery.

## 2024-12-16 ENCOUNTER — TELEPHONE (OUTPATIENT)
Dept: NEUROSURGERY | Age: 79
End: 2024-12-16

## 2025-02-07 ENCOUNTER — APPOINTMENT (OUTPATIENT)
Dept: CT IMAGING | Age: 80
End: 2025-02-07
Payer: MEDICARE

## 2025-02-07 ENCOUNTER — HOSPITAL ENCOUNTER (EMERGENCY)
Age: 80
Discharge: HOME | End: 2025-02-07
Attending: EMERGENCY MEDICINE
Payer: MEDICARE

## 2025-02-07 VITALS
TEMPERATURE: 99 F | HEART RATE: 90 BPM | DIASTOLIC BLOOD PRESSURE: 72 MMHG | SYSTOLIC BLOOD PRESSURE: 137 MMHG | OXYGEN SATURATION: 96 % | BODY MASS INDEX: 25.77 KG/M2 | WEIGHT: 180 LBS | RESPIRATION RATE: 16 BRPM | HEIGHT: 70 IN

## 2025-02-07 DIAGNOSIS — R10.9 RIGHT FLANK PAIN: Primary | ICD-10-CM

## 2025-02-07 DIAGNOSIS — N28.89 LEFT KIDNEY MASS: ICD-10-CM

## 2025-02-07 LAB
ALBUMIN SERPL-MCNC: 3.5 G/DL (ref 3.2–4.6)
ALBUMIN/GLOB SERPL: 0.9 (ref 1–1.9)
ALP SERPL-CCNC: 62 U/L (ref 40–129)
ALT SERPL-CCNC: 9 U/L (ref 8–55)
ANION GAP SERPL CALC-SCNC: 11 MMOL/L (ref 7–16)
APPEARANCE UR: CLEAR
AST SERPL-CCNC: 16 U/L (ref 15–37)
BACTERIA URNS QL MICRO: NEGATIVE /HPF
BASOPHILS # BLD: 0.09 K/UL (ref 0–0.2)
BASOPHILS NFR BLD: 0.8 % (ref 0–2)
BILIRUB SERPL-MCNC: 0.2 MG/DL (ref 0–1.2)
BILIRUB UR QL: NEGATIVE
BUN SERPL-MCNC: 25 MG/DL (ref 8–23)
CALCIUM SERPL-MCNC: 9.6 MG/DL (ref 8.8–10.2)
CHLORIDE SERPL-SCNC: 96 MMOL/L (ref 98–107)
CO2 SERPL-SCNC: 31 MMOL/L (ref 20–29)
COLOR UR: ABNORMAL
CREAT SERPL-MCNC: 1.4 MG/DL (ref 0.8–1.3)
DIFFERENTIAL METHOD BLD: ABNORMAL
EOSINOPHIL # BLD: 0.12 K/UL (ref 0–0.8)
EOSINOPHIL NFR BLD: 1.1 % (ref 0.5–7.8)
EPI CELLS #/AREA URNS HPF: ABNORMAL /HPF
ERYTHROCYTE [DISTWIDTH] IN BLOOD BY AUTOMATED COUNT: 13.4 % (ref 11.9–14.6)
GLOBULIN SER CALC-MCNC: 3.7 G/DL (ref 2.3–3.5)
GLUCOSE SERPL-MCNC: 81 MG/DL (ref 70–99)
GLUCOSE UR STRIP.AUTO-MCNC: NEGATIVE MG/DL
HCT VFR BLD AUTO: 30.4 % (ref 41.1–50.3)
HGB BLD-MCNC: 9.2 G/DL (ref 13.6–17.2)
HGB UR QL STRIP: NEGATIVE
HYALINE CASTS URNS QL MICRO: ABNORMAL /LPF
IMM GRANULOCYTES # BLD AUTO: 0.04 K/UL (ref 0–0.5)
IMM GRANULOCYTES NFR BLD AUTO: 0.4 % (ref 0–5)
KETONES UR QL STRIP.AUTO: ABNORMAL MG/DL
LEUKOCYTE ESTERASE UR QL STRIP.AUTO: NEGATIVE
LIPASE SERPL-CCNC: 19 U/L (ref 13–60)
LYMPHOCYTES # BLD: 1.78 K/UL (ref 0.5–4.6)
LYMPHOCYTES NFR BLD: 15.9 % (ref 13–44)
MCH RBC QN AUTO: 29.1 PG (ref 26.1–32.9)
MCHC RBC AUTO-ENTMCNC: 30.3 G/DL (ref 31.4–35)
MCV RBC AUTO: 96.2 FL (ref 82–102)
MONOCYTES # BLD: 0.84 K/UL (ref 0.1–1.3)
MONOCYTES NFR BLD: 7.5 % (ref 4–12)
NEUTS SEG # BLD: 8.3 K/UL (ref 1.7–8.2)
NEUTS SEG NFR BLD: 74.3 % (ref 43–78)
NITRITE UR QL STRIP.AUTO: NEGATIVE
NRBC # BLD: 0 K/UL (ref 0–0.2)
PH UR STRIP: 6.5 (ref 5–9)
PLATELET # BLD AUTO: 403 K/UL (ref 150–450)
PMV BLD AUTO: 9.4 FL (ref 9.4–12.3)
POTASSIUM SERPL-SCNC: 4 MMOL/L (ref 3.5–5.1)
PROT SERPL-MCNC: 7.2 G/DL (ref 6.3–8.2)
PROT UR STRIP-MCNC: ABNORMAL MG/DL
RBC # BLD AUTO: 3.16 M/UL (ref 4.23–5.6)
RBC #/AREA URNS HPF: ABNORMAL /HPF
SODIUM SERPL-SCNC: 139 MMOL/L (ref 136–145)
SP GR UR REFRACTOMETRY: 1.02 (ref 1–1.02)
UROBILINOGEN UR QL STRIP.AUTO: 1 EU/DL (ref 0.2–1)
WBC # BLD AUTO: 11.2 K/UL (ref 4.3–11.1)
WBC URNS QL MICRO: ABNORMAL /HPF

## 2025-02-07 PROCEDURE — 83690 ASSAY OF LIPASE: CPT

## 2025-02-07 PROCEDURE — 80053 COMPREHEN METABOLIC PANEL: CPT

## 2025-02-07 PROCEDURE — 81001 URINALYSIS AUTO W/SCOPE: CPT

## 2025-02-07 PROCEDURE — 74177 CT ABD & PELVIS W/CONTRAST: CPT

## 2025-02-07 PROCEDURE — 6370000000 HC RX 637 (ALT 250 FOR IP): Performed by: EMERGENCY MEDICINE

## 2025-02-07 PROCEDURE — 6360000004 HC RX CONTRAST MEDICATION: Performed by: EMERGENCY MEDICINE

## 2025-02-07 PROCEDURE — 85025 COMPLETE CBC W/AUTO DIFF WBC: CPT

## 2025-02-07 PROCEDURE — 99285 EMERGENCY DEPT VISIT HI MDM: CPT

## 2025-02-07 RX ORDER — OXYCODONE HYDROCHLORIDE 5 MG/1
5 TABLET ORAL
Status: COMPLETED | OUTPATIENT
Start: 2025-02-07 | End: 2025-02-07

## 2025-02-07 RX ORDER — IOPAMIDOL 755 MG/ML
100 INJECTION, SOLUTION INTRAVASCULAR
Status: COMPLETED | OUTPATIENT
Start: 2025-02-07 | End: 2025-02-07

## 2025-02-07 RX ADMIN — IOPAMIDOL 100 ML: 755 INJECTION, SOLUTION INTRAVENOUS at 11:47

## 2025-02-07 RX ADMIN — OXYCODONE 5 MG: 5 TABLET ORAL at 17:04

## 2025-02-07 ASSESSMENT — PAIN - FUNCTIONAL ASSESSMENT: PAIN_FUNCTIONAL_ASSESSMENT: 0-10

## 2025-02-07 ASSESSMENT — PAIN DESCRIPTION - ORIENTATION
ORIENTATION: RIGHT
ORIENTATION: RIGHT

## 2025-02-07 ASSESSMENT — PAIN SCALES - GENERAL
PAINLEVEL_OUTOF10: 5
PAINLEVEL_OUTOF10: 10

## 2025-02-07 ASSESSMENT — PAIN DESCRIPTION - LOCATION
LOCATION: RIB CAGE
LOCATION: RIB CAGE

## 2025-02-07 NOTE — DISCHARGE INSTRUCTIONS
Follow-up with his new medical provider regarding these pains and ongoing workup  We have discussed the change and mass on his left kidney with [facility]  May use his regular medicines for discomfort  Further workup with new or worsening problems

## 2025-02-07 NOTE — ED PROVIDER NOTES
Emergency Department Provider Note       PCP: None, None   Age: 79 y.o.   Sex: male     DISPOSITION                No diagnosis found.    Medical Decision Making     ***     {Complexity:33745}  {Risk:33004}    I independently ordered and reviewed each unique test.  {external source:37190}   {Historian (state who, why needed, what they said):31530}  {test reviewed:35322}  {EK}  {Admitted or Consultants involved.:67437}  {MIPS URI - Strep - Sinusitis - Pregnant - Head Trauma - Overdose - Agitation:20989}  {SEP1 yes/no:88779}  {Critical Care:17063}    History     Patient brought in by younger members of family.  He is here with very vague history and very nonspecific.  He has pain that appears to mainly be right upper quadrant.  Does not have any history of injury or trauma and has no rib pain type complaints or abnormality at the time of exam.  No fever.  No nausea or vomiting.  No flu or COVID type symptoms.  Patient overall is very poor historian.  There is no skin rash or redness to area of discomfort and it actually took some time to even get a general idea as to where the discomfort was in this patient does have history of rheumatoid arthritis diabetes has been recommended for surgeries in the past that he is declined or not been able to complete and he is somewhat uncertain as to what these were    The history is provided by the patient, a friend and a relative.       ROS     Review of Systems     Physical Exam     Vitals signs and nursing note reviewed:  Vitals:    25 0832   BP: (!) 163/77   Pulse: 96   Resp: 17   Temp: 98.6 °F (37 °C)   TempSrc: Oral   SpO2: 96%   Weight: 81.6 kg (180 lb)   Height: 1.778 m (5' 10\")      Physical Exam  Vitals and nursing note reviewed.   Constitutional:       General: He is not in acute distress.     Appearance: He is not ill-appearing, toxic-appearing or diaphoretic.      Comments: Very pleasant cooperative without really any acute changes identified   HENT:

## 2025-02-07 NOTE — ED NOTES
Patient mobility status  with mild difficulty.     I have reviewed discharge instructions with the patient.  The patient verbalized understanding.    Patient left ED via Discharge Method: wheelchair to Home with Extended Family:.    Opportunity for questions and clarification provided.     Patient given 0 scripts.            Nolan, Nahomy, RN  02/07/25 [id number]

## 2025-02-07 NOTE — ED TRIAGE NOTES
Patient arrived with a complaint of right side pain started 2 days ago. Patient reports of more pain with movement. No injury per patient, no fall per patient

## 2025-02-13 ASSESSMENT — ENCOUNTER SYMPTOMS
ANAL BLEEDING: 0
NAUSEA: 0
VOMITING: 0
COUGH: 0
SHORTNESS OF BREATH: 0
BLOOD IN STOOL: 0

## 2025-05-25 ENCOUNTER — APPOINTMENT (OUTPATIENT)
Dept: GENERAL RADIOLOGY | Age: 80
DRG: 378 | End: 2025-05-25
Payer: MEDICARE

## 2025-05-25 ENCOUNTER — HOSPITAL ENCOUNTER (INPATIENT)
Age: 80
LOS: 4 days | Discharge: HOME HEALTH | DRG: 378 | End: 2025-05-29
Attending: EMERGENCY MEDICINE | Admitting: STUDENT IN AN ORGANIZED HEALTH CARE EDUCATION/TRAINING PROGRAM
Payer: MEDICARE

## 2025-05-25 ENCOUNTER — ANESTHESIA EVENT (OUTPATIENT)
Dept: SURGERY | Age: 80
DRG: 378 | End: 2025-05-25
Payer: MEDICARE

## 2025-05-25 DIAGNOSIS — K92.1 MELENA: ICD-10-CM

## 2025-05-25 DIAGNOSIS — K92.2 ACUTE UPPER GI BLEED: Primary | ICD-10-CM

## 2025-05-25 DIAGNOSIS — D62 ANEMIA DUE TO ACUTE BLOOD LOSS: ICD-10-CM

## 2025-05-25 PROBLEM — N28.89 LEFT RENAL MASS: Status: ACTIVE | Noted: 2025-05-25

## 2025-05-25 PROBLEM — R79.89 ELEVATED TROPONIN: Status: ACTIVE | Noted: 2025-05-25

## 2025-05-25 PROBLEM — D72.829 LEUKOCYTOSIS: Status: ACTIVE | Noted: 2025-05-25

## 2025-05-25 PROBLEM — N40.0 BPH (BENIGN PROSTATIC HYPERPLASIA): Status: ACTIVE | Noted: 2025-05-25

## 2025-05-25 LAB
ALBUMIN SERPL-MCNC: 2.8 G/DL (ref 3.2–4.6)
ALBUMIN/GLOB SERPL: 0.9 (ref 1–1.9)
ALP SERPL-CCNC: 53 U/L (ref 40–129)
ALT SERPL-CCNC: 11 U/L (ref 8–55)
ANION GAP SERPL CALC-SCNC: 16 MMOL/L (ref 7–16)
AST SERPL-CCNC: 16 U/L (ref 15–37)
BASOPHILS # BLD: 0.06 K/UL (ref 0–0.2)
BASOPHILS NFR BLD: 0.2 % (ref 0–2)
BILIRUB SERPL-MCNC: <0.2 MG/DL (ref 0–1.2)
BUN SERPL-MCNC: 94 MG/DL (ref 8–23)
CALCIUM SERPL-MCNC: 8.8 MG/DL (ref 8.8–10.2)
CHLORIDE SERPL-SCNC: 104 MMOL/L (ref 98–107)
CK SERPL-CCNC: 230 U/L (ref 21–215)
CO2 SERPL-SCNC: 23 MMOL/L (ref 20–29)
CREAT SERPL-MCNC: 1.99 MG/DL (ref 0.8–1.3)
DIFFERENTIAL METHOD BLD: ABNORMAL
EKG ATRIAL RATE: 93 BPM
EKG DIAGNOSIS: NORMAL
EKG P AXIS: 5 DEGREES
EKG P-R INTERVAL: 163 MS
EKG Q-T INTERVAL: 349 MS
EKG QRS DURATION: 88 MS
EKG QTC CALCULATION (BAZETT): 432 MS
EKG R AXIS: 34 DEGREES
EKG T AXIS: 84 DEGREES
EKG VENTRICULAR RATE: 92 BPM
EOSINOPHIL # BLD: 0.03 K/UL (ref 0–0.8)
EOSINOPHIL NFR BLD: 0.1 % (ref 0.5–7.8)
ERYTHROCYTE [DISTWIDTH] IN BLOOD BY AUTOMATED COUNT: 14.2 % (ref 11.9–14.6)
GLOBULIN SER CALC-MCNC: 3.2 G/DL (ref 2.3–3.5)
GLUCOSE BLD STRIP.AUTO-MCNC: 111 MG/DL (ref 65–100)
GLUCOSE BLD STRIP.AUTO-MCNC: 115 MG/DL (ref 65–100)
GLUCOSE SERPL-MCNC: 142 MG/DL (ref 70–99)
HCT VFR BLD AUTO: 16.4 % (ref 41.1–50.3)
HCT VFR BLD AUTO: 19.2 % (ref 41.1–50.3)
HGB BLD-MCNC: 5.1 G/DL (ref 13.6–17.2)
HGB BLD-MCNC: 6.4 G/DL (ref 13.6–17.2)
HISTORY CHECK: NORMAL
HISTORY CHECK: NORMAL
IMM GRANULOCYTES # BLD AUTO: 0.3 K/UL (ref 0–0.5)
IMM GRANULOCYTES NFR BLD AUTO: 1.2 % (ref 0–5)
LYMPHOCYTES # BLD: 1.82 K/UL (ref 0.5–4.6)
LYMPHOCYTES NFR BLD: 7.4 % (ref 13–44)
MCH RBC QN AUTO: 30 PG (ref 26.1–32.9)
MCHC RBC AUTO-ENTMCNC: 31.1 G/DL (ref 31.4–35)
MCV RBC AUTO: 96.5 FL (ref 82–102)
MONOCYTES # BLD: 1.81 K/UL (ref 0.1–1.3)
MONOCYTES NFR BLD: 7.4 % (ref 4–12)
NEUTS SEG # BLD: 20.52 K/UL (ref 1.7–8.2)
NEUTS SEG NFR BLD: 83.7 % (ref 43–78)
NRBC # BLD: 0.28 K/UL (ref 0–0.2)
PLATELET # BLD AUTO: 337 K/UL (ref 150–450)
PMV BLD AUTO: 10 FL (ref 9.4–12.3)
POTASSIUM SERPL-SCNC: 4.2 MMOL/L (ref 3.5–5.1)
PROT SERPL-MCNC: 6 G/DL (ref 6.3–8.2)
RBC # BLD AUTO: 1.7 M/UL (ref 4.23–5.6)
SERVICE CMNT-IMP: ABNORMAL
SERVICE CMNT-IMP: ABNORMAL
SODIUM SERPL-SCNC: 143 MMOL/L (ref 136–145)
TROPONIN T SERPL HS-MCNC: 62.7 NG/L (ref 0–22)
TROPONIN T SERPL HS-MCNC: 71.1 NG/L (ref 0–22)
WBC # BLD AUTO: 24.5 K/UL (ref 4.3–11.1)

## 2025-05-25 PROCEDURE — 6360000002 HC RX W HCPCS: Performed by: EMERGENCY MEDICINE

## 2025-05-25 PROCEDURE — 99285 EMERGENCY DEPT VISIT HI MDM: CPT

## 2025-05-25 PROCEDURE — 6370000000 HC RX 637 (ALT 250 FOR IP): Performed by: STUDENT IN AN ORGANIZED HEALTH CARE EDUCATION/TRAINING PROGRAM

## 2025-05-25 PROCEDURE — 36430 TRANSFUSION BLD/BLD COMPNT: CPT

## 2025-05-25 PROCEDURE — 2580000003 HC RX 258: Performed by: STUDENT IN AN ORGANIZED HEALTH CARE EDUCATION/TRAINING PROGRAM

## 2025-05-25 PROCEDURE — 96374 THER/PROPH/DIAG INJ IV PUSH: CPT

## 2025-05-25 PROCEDURE — 86923 COMPATIBILITY TEST ELECTRIC: CPT

## 2025-05-25 PROCEDURE — P9016 RBC LEUKOCYTES REDUCED: HCPCS

## 2025-05-25 PROCEDURE — 2580000003 HC RX 258: Performed by: EMERGENCY MEDICINE

## 2025-05-25 PROCEDURE — 82550 ASSAY OF CK (CPK): CPT

## 2025-05-25 PROCEDURE — 93010 ELECTROCARDIOGRAM REPORT: CPT | Performed by: INTERNAL MEDICINE

## 2025-05-25 PROCEDURE — 85018 HEMOGLOBIN: CPT

## 2025-05-25 PROCEDURE — 2500000003 HC RX 250 WO HCPCS: Performed by: STUDENT IN AN ORGANIZED HEALTH CARE EDUCATION/TRAINING PROGRAM

## 2025-05-25 PROCEDURE — 84484 ASSAY OF TROPONIN QUANT: CPT

## 2025-05-25 PROCEDURE — 86901 BLOOD TYPING SEROLOGIC RH(D): CPT

## 2025-05-25 PROCEDURE — 71046 X-RAY EXAM CHEST 2 VIEWS: CPT

## 2025-05-25 PROCEDURE — 6360000002 HC RX W HCPCS: Performed by: STUDENT IN AN ORGANIZED HEALTH CARE EDUCATION/TRAINING PROGRAM

## 2025-05-25 PROCEDURE — 85014 HEMATOCRIT: CPT

## 2025-05-25 PROCEDURE — 86850 RBC ANTIBODY SCREEN: CPT

## 2025-05-25 PROCEDURE — 80053 COMPREHEN METABOLIC PANEL: CPT

## 2025-05-25 PROCEDURE — 82962 GLUCOSE BLOOD TEST: CPT

## 2025-05-25 PROCEDURE — 36415 COLL VENOUS BLD VENIPUNCTURE: CPT

## 2025-05-25 PROCEDURE — 30233N1 TRANSFUSION OF NONAUTOLOGOUS RED BLOOD CELLS INTO PERIPHERAL VEIN, PERCUTANEOUS APPROACH: ICD-10-PCS | Performed by: EMERGENCY MEDICINE

## 2025-05-25 PROCEDURE — 86900 BLOOD TYPING SEROLOGIC ABO: CPT

## 2025-05-25 PROCEDURE — 93005 ELECTROCARDIOGRAM TRACING: CPT | Performed by: EMERGENCY MEDICINE

## 2025-05-25 PROCEDURE — 1100000003 HC PRIVATE W/ TELEMETRY

## 2025-05-25 PROCEDURE — 85025 COMPLETE CBC W/AUTO DIFF WBC: CPT

## 2025-05-25 RX ORDER — ACETAMINOPHEN 325 MG/1
650 TABLET ORAL EVERY 6 HOURS PRN
Status: DISCONTINUED | OUTPATIENT
Start: 2025-05-25 | End: 2025-05-29 | Stop reason: HOSPADM

## 2025-05-25 RX ORDER — SODIUM CHLORIDE 9 MG/ML
INJECTION, SOLUTION INTRAVENOUS PRN
Status: DISCONTINUED | OUTPATIENT
Start: 2025-05-25 | End: 2025-05-29 | Stop reason: HOSPADM

## 2025-05-25 RX ORDER — SODIUM CHLORIDE 9 MG/ML
INJECTION, SOLUTION INTRAVENOUS PRN
Status: CANCELLED | OUTPATIENT
Start: 2025-05-25

## 2025-05-25 RX ORDER — SODIUM CHLORIDE 0.9 % (FLUSH) 0.9 %
5-40 SYRINGE (ML) INJECTION EVERY 12 HOURS SCHEDULED
Status: DISCONTINUED | OUTPATIENT
Start: 2025-05-25 | End: 2025-05-29 | Stop reason: HOSPADM

## 2025-05-25 RX ORDER — POTASSIUM CHLORIDE 1500 MG/1
40 TABLET, EXTENDED RELEASE ORAL PRN
Status: DISCONTINUED | OUTPATIENT
Start: 2025-05-25 | End: 2025-05-29 | Stop reason: HOSPADM

## 2025-05-25 RX ORDER — MAGNESIUM SULFATE IN WATER 40 MG/ML
2000 INJECTION, SOLUTION INTRAVENOUS PRN
Status: DISCONTINUED | OUTPATIENT
Start: 2025-05-25 | End: 2025-05-29 | Stop reason: HOSPADM

## 2025-05-25 RX ORDER — ACETAMINOPHEN 650 MG/1
650 SUPPOSITORY RECTAL EVERY 6 HOURS PRN
Status: DISCONTINUED | OUTPATIENT
Start: 2025-05-25 | End: 2025-05-29 | Stop reason: HOSPADM

## 2025-05-25 RX ORDER — SODIUM CHLORIDE 9 MG/ML
INJECTION, SOLUTION INTRAVENOUS PRN
Status: DISCONTINUED | OUTPATIENT
Start: 2025-05-25 | End: 2025-05-25 | Stop reason: SDUPTHER

## 2025-05-25 RX ORDER — TAMSULOSIN HYDROCHLORIDE 0.4 MG/1
0.4 CAPSULE ORAL DAILY
Status: DISCONTINUED | OUTPATIENT
Start: 2025-05-26 | End: 2025-05-29 | Stop reason: HOSPADM

## 2025-05-25 RX ORDER — IBUPROFEN 600 MG/1
1 TABLET ORAL PRN
Status: DISCONTINUED | OUTPATIENT
Start: 2025-05-25 | End: 2025-05-29 | Stop reason: HOSPADM

## 2025-05-25 RX ORDER — SODIUM CHLORIDE 0.9 % (FLUSH) 0.9 %
5-40 SYRINGE (ML) INJECTION PRN
Status: CANCELLED | OUTPATIENT
Start: 2025-05-25

## 2025-05-25 RX ORDER — POTASSIUM CHLORIDE 7.45 MG/ML
10 INJECTION INTRAVENOUS PRN
Status: DISCONTINUED | OUTPATIENT
Start: 2025-05-25 | End: 2025-05-29 | Stop reason: HOSPADM

## 2025-05-25 RX ORDER — SODIUM CHLORIDE, SODIUM LACTATE, POTASSIUM CHLORIDE, CALCIUM CHLORIDE 600; 310; 30; 20 MG/100ML; MG/100ML; MG/100ML; MG/100ML
INJECTION, SOLUTION INTRAVENOUS CONTINUOUS
Status: CANCELLED | OUTPATIENT
Start: 2025-05-25

## 2025-05-25 RX ORDER — POLYETHYLENE GLYCOL 3350 17 G/17G
17 POWDER, FOR SOLUTION ORAL DAILY PRN
Status: DISCONTINUED | OUTPATIENT
Start: 2025-05-25 | End: 2025-05-29 | Stop reason: HOSPADM

## 2025-05-25 RX ORDER — LIDOCAINE HYDROCHLORIDE 10 MG/ML
1 INJECTION, SOLUTION INFILTRATION; PERINEURAL
Status: CANCELLED | OUTPATIENT
Start: 2025-05-25

## 2025-05-25 RX ORDER — ONDANSETRON 2 MG/ML
4 INJECTION INTRAMUSCULAR; INTRAVENOUS EVERY 6 HOURS PRN
Status: DISCONTINUED | OUTPATIENT
Start: 2025-05-25 | End: 2025-05-29 | Stop reason: HOSPADM

## 2025-05-25 RX ORDER — TRAMADOL HYDROCHLORIDE 50 MG/1
50 TABLET ORAL EVERY 6 HOURS PRN
Status: DISCONTINUED | OUTPATIENT
Start: 2025-05-25 | End: 2025-05-29 | Stop reason: HOSPADM

## 2025-05-25 RX ORDER — SODIUM CHLORIDE 9 MG/ML
INJECTION, SOLUTION INTRAVENOUS CONTINUOUS
Status: ACTIVE | OUTPATIENT
Start: 2025-05-25 | End: 2025-05-26

## 2025-05-25 RX ORDER — INSULIN LISPRO 100 [IU]/ML
0-4 INJECTION, SOLUTION INTRAVENOUS; SUBCUTANEOUS
Status: DISCONTINUED | OUTPATIENT
Start: 2025-05-25 | End: 2025-05-29 | Stop reason: HOSPADM

## 2025-05-25 RX ORDER — 0.9 % SODIUM CHLORIDE 0.9 %
1000 INTRAVENOUS SOLUTION INTRAVENOUS
Status: COMPLETED | OUTPATIENT
Start: 2025-05-25 | End: 2025-05-25

## 2025-05-25 RX ORDER — INSULIN GLARGINE 100 [IU]/ML
20 INJECTION, SOLUTION SUBCUTANEOUS DAILY
Status: DISCONTINUED | OUTPATIENT
Start: 2025-05-26 | End: 2025-05-27

## 2025-05-25 RX ORDER — SODIUM CHLORIDE 0.9 % (FLUSH) 0.9 %
5-40 SYRINGE (ML) INJECTION EVERY 12 HOURS SCHEDULED
Status: CANCELLED | OUTPATIENT
Start: 2025-05-25

## 2025-05-25 RX ORDER — SODIUM CHLORIDE 9 MG/ML
INJECTION, SOLUTION INTRAVENOUS PRN
Status: DISCONTINUED | OUTPATIENT
Start: 2025-05-25 | End: 2025-05-25

## 2025-05-25 RX ORDER — DEXTROSE MONOHYDRATE 100 MG/ML
INJECTION, SOLUTION INTRAVENOUS CONTINUOUS PRN
Status: DISCONTINUED | OUTPATIENT
Start: 2025-05-25 | End: 2025-05-29 | Stop reason: HOSPADM

## 2025-05-25 RX ORDER — SODIUM CHLORIDE 0.9 % (FLUSH) 0.9 %
5-40 SYRINGE (ML) INJECTION PRN
Status: DISCONTINUED | OUTPATIENT
Start: 2025-05-25 | End: 2025-05-29 | Stop reason: HOSPADM

## 2025-05-25 RX ORDER — PREDNISONE 5 MG/1
5 TABLET ORAL 2 TIMES DAILY
Status: DISCONTINUED | OUTPATIENT
Start: 2025-05-25 | End: 2025-05-29 | Stop reason: HOSPADM

## 2025-05-25 RX ORDER — ONDANSETRON 4 MG/1
4 TABLET, ORALLY DISINTEGRATING ORAL EVERY 8 HOURS PRN
Status: DISCONTINUED | OUTPATIENT
Start: 2025-05-25 | End: 2025-05-29 | Stop reason: HOSPADM

## 2025-05-25 RX ADMIN — PREDNISONE 5 MG: 5 TABLET ORAL at 21:36

## 2025-05-25 RX ADMIN — SODIUM CHLORIDE: 0.9 INJECTION, SOLUTION INTRAVENOUS at 23:59

## 2025-05-25 RX ADMIN — SODIUM CHLORIDE 40 MG: 9 INJECTION, SOLUTION INTRAMUSCULAR; INTRAVENOUS; SUBCUTANEOUS at 21:38

## 2025-05-25 RX ADMIN — SODIUM CHLORIDE 1000 ML: 0.9 INJECTION, SOLUTION INTRAVENOUS at 13:17

## 2025-05-25 RX ADMIN — SODIUM CHLORIDE, PRESERVATIVE FREE 10 ML: 5 INJECTION INTRAVENOUS at 21:39

## 2025-05-25 RX ADMIN — PANTOPRAZOLE SODIUM 40 MG: 40 INJECTION, POWDER, LYOPHILIZED, FOR SOLUTION INTRAVENOUS at 13:42

## 2025-05-25 RX ADMIN — SODIUM CHLORIDE: 0.9 INJECTION, SOLUTION INTRAVENOUS at 16:06

## 2025-05-25 ASSESSMENT — PAIN SCALES - GENERAL
PAINLEVEL_OUTOF10: 4
PAINLEVEL_OUTOF10: 0

## 2025-05-25 ASSESSMENT — PAIN - FUNCTIONAL ASSESSMENT: PAIN_FUNCTIONAL_ASSESSMENT: 0-10

## 2025-05-25 ASSESSMENT — PAIN DESCRIPTION - LOCATION: LOCATION: GENERALIZED

## 2025-05-25 NOTE — CONSENT
Informed Consent for Blood Component Transfusion Note    I have discussed with the patient the rationale for blood component transfusion; its benefits in treating or preventing fatigue, organ damage, or death; and its risk which includes mild transfusion reactions, rare risk of blood borne infection, or more serious but rare reactions. I have discussed the alternatives to transfusion, including the risk and consequences of not receiving transfusion. The patient had an opportunity to ask questions and had agreed to proceed with transfusion of blood components.    Electronically signed by Sadiq Chavarria DO on 5/25/25 at 1:30 PM EDT

## 2025-05-25 NOTE — ED TRIAGE NOTES
Patient arrives via [facility] with CO generalized weakness lowered self to ground was on the ground for approxiamately 1 hour, unable to get self up. Denies fall, LOC. Per family around 1900 became notably weaker. RACE 0

## 2025-05-25 NOTE — ED NOTES
TRANSFER - OUT REPORT:    Verbal report given to NICOLAS Coronel  on Ney Allen  being transferred to [facility] for routine progression of patient care       Report consisted of patient's Situation, Background, Assessment and   Recommendations(SBAR).     Information from the following report(s) ED Encounter Summary was reviewed with the receiving nurse.    Gilbert Fall Assessment:    Presents to emergency department  because of falls (Syncope, seizure, or loss of consciousness): Yes  Age > 70: Yes  Altered Mental Status, Intoxication with alcohol or substance confusion (Disorientation, impaired judgment, poor safety awaremess, or inability to follow instructions): No  Impaired Mobility: Ambulates or transfers with assistive devices or assistance; Unable to ambulate or transer.: Yes  Nursing Judgement: Yes          Lines:   Peripheral IV 05/25/25 Posterior;Right Forearm (Active)   Site Assessment Clean, dry & intact 05/25/25 1251   Line Status Blood return noted 05/25/25 1251   Phlebitis Assessment No symptoms 05/25/25 1251   Infiltration Assessment 0 05/25/25 1251       Peripheral IV 05/25/25 Left Antecubital (Active)        Opportunity for questions and clarification was provided.      Patient transported with:  Registered Nurse           Rene Mott RN  05/25/25 3793

## 2025-05-25 NOTE — ED PROVIDER NOTES
Emergency Department Provider Note       [facility] EMERGENCY DEPT   PCP: None, None   Age: 79 y.o.   Sex: male     DISPOSITION Decision To Admit 05/25/2025 02:03:35 PM    ICD-10-CM    1. Acute upper GI bleed  K92.2       2. Melena  K92.1       3. Anemia due to acute blood loss  D62           Medical Decision Making     Ddx:    Dehydration, renal failure, rhabdomyolysis, electrolyte abnormality, urinary tract infection, pneumonia,    ED Course as of 05/25/25 1419   Sun May 25, 2025   1337 I talked to the patient about the concerns over the hemoglobin of 5.1.  He says that his stools been a lot darker than normal for the last week.  The patient says he has never had a GI bleed in the past.  He denies taking any Pepto-Bismol or iron tablets.  We talked about the need for blood transfusion and admission to the hospital for further investigation into the GI bleed.  The patient is agreeable with this plan. [KH]   1419 I spoke with gastroenterology and internal medicine.  They are both can see the patient. [KH]      ED Course User Index  [KH] Sadiq Chavarria, DO     1 chronic illness with exacerbation.  Drug therapy given requiring intensive monitoring for toxicity.  Shared medical decision making was utilized in creating the patients health plan today.  I independently ordered and reviewed each unique test.    I reviewed external records: ED visit note from a different ED.   I reviewed external records: provider visit note from PCP.  I reviewed external records: provider visit note from outside specialist.  I reviewed external records: previous EKG including cardiologist interpretation.    I reviewed external records: previous lab results from outside ED.  I reviewed external records: previous imaging study including radiologist interpretation.   The patients assessment required an independent historian: Daughter.  The reason they were needed is important historical information not provided by the patient.  ED cardiac

## 2025-05-25 NOTE — H&P
71.1 (H) 0 - 22 ng/L   PREPARE RBC (CROSSMATCH), 1 Units    Collection Time: 05/25/25  1:30 PM   Result Value Ref Range    History Check Historical check performed    TYPE AND SCREEN    Collection Time: 05/25/25  1:39 PM   Result Value Ref Range    Crossmatch expiration date 05/28/2025,2359     ABO/Rh O POSITIVE     Antibody Screen NEG     Blood Bank Comment       INFORMED TIAN FROM ED AT 1420 05.25.25 THAT BLOOD IS READY     Unit Number [id number]     Product Code Blood Bank RC LR     Unit Divison 00     Dispense Status Blood Bank ALLOCATED     Crossmatch Result Compatible        No results for input(s): \"COVID19\" in the last 72 hours.    No results found.      Signed:  Gregory Melo DO    Part of this note may have been written by using a voice dictation software.  The note has been proof read but may still contain some grammatical/other typographical errors.

## 2025-05-26 ENCOUNTER — ANESTHESIA (OUTPATIENT)
Dept: SURGERY | Age: 80
DRG: 378 | End: 2025-05-26
Payer: MEDICARE

## 2025-05-26 PROBLEM — F11.90 CHRONIC, CONTINUOUS USE OF OPIOIDS: Status: ACTIVE | Noted: 2025-05-26

## 2025-05-26 PROBLEM — D64.9 SYMPTOMATIC ANEMIA: Status: ACTIVE | Noted: 2025-05-26

## 2025-05-26 LAB
ANION GAP SERPL CALC-SCNC: 10 MMOL/L (ref 7–16)
BASOPHILS # BLD: 0.06 K/UL (ref 0–0.2)
BASOPHILS NFR BLD: 0.3 % (ref 0–2)
BUN SERPL-MCNC: 73 MG/DL (ref 8–23)
CALCIUM SERPL-MCNC: 8 MG/DL (ref 8.8–10.2)
CHLORIDE SERPL-SCNC: 110 MMOL/L (ref 98–107)
CO2 SERPL-SCNC: 25 MMOL/L (ref 20–29)
CREAT SERPL-MCNC: 1.68 MG/DL (ref 0.8–1.3)
DIFFERENTIAL METHOD BLD: ABNORMAL
EOSINOPHIL # BLD: 0.03 K/UL (ref 0–0.8)
EOSINOPHIL NFR BLD: 0.1 % (ref 0.5–7.8)
ERYTHROCYTE [DISTWIDTH] IN BLOOD BY AUTOMATED COUNT: 15 % (ref 11.9–14.6)
EST. AVERAGE GLUCOSE BLD GHB EST-MCNC: 134 MG/DL
FERRITIN SERPL-MCNC: 117 NG/ML (ref 8–388)
GLUCOSE BLD STRIP.AUTO-MCNC: 123 MG/DL (ref 65–100)
GLUCOSE BLD STRIP.AUTO-MCNC: 156 MG/DL (ref 65–100)
GLUCOSE BLD STRIP.AUTO-MCNC: 72 MG/DL (ref 65–100)
GLUCOSE BLD STRIP.AUTO-MCNC: 74 MG/DL (ref 65–100)
GLUCOSE BLD STRIP.AUTO-MCNC: 76 MG/DL (ref 65–100)
GLUCOSE BLD STRIP.AUTO-MCNC: 88 MG/DL (ref 65–100)
GLUCOSE SERPL-MCNC: 84 MG/DL (ref 70–99)
HBA1C MFR BLD: 6.3 % (ref 0–5.6)
HCT VFR BLD AUTO: 20.5 % (ref 41.1–50.3)
HCT VFR BLD AUTO: 23 % (ref 41.1–50.3)
HCT VFR BLD AUTO: 24.1 % (ref 41.1–50.3)
HGB BLD-MCNC: 6.4 G/DL (ref 13.6–17.2)
HGB BLD-MCNC: 7.6 G/DL (ref 13.6–17.2)
HGB BLD-MCNC: 8.1 G/DL (ref 13.6–17.2)
HISTORY CHECK: NORMAL
IMM GRANULOCYTES # BLD AUTO: 0.28 K/UL (ref 0–0.5)
IMM GRANULOCYTES NFR BLD AUTO: 1.4 % (ref 0–5)
IRON SATN MFR SERPL: ABNORMAL % (ref 20–50)
IRON SERPL-MCNC: 186 UG/DL (ref 35–100)
LYMPHOCYTES # BLD: 1.2 K/UL (ref 0.5–4.6)
LYMPHOCYTES NFR BLD: 5.8 % (ref 13–44)
MCH RBC QN AUTO: 29.4 PG (ref 26.1–32.9)
MCHC RBC AUTO-ENTMCNC: 31.2 G/DL (ref 31.4–35)
MCV RBC AUTO: 94 FL (ref 82–102)
MONOCYTES # BLD: 1.52 K/UL (ref 0.1–1.3)
MONOCYTES NFR BLD: 7.4 % (ref 4–12)
NEUTS SEG # BLD: 17.44 K/UL (ref 1.7–8.2)
NEUTS SEG NFR BLD: 85 % (ref 43–78)
NRBC # BLD: 0.21 K/UL (ref 0–0.2)
PLATELET # BLD AUTO: 261 K/UL (ref 150–450)
PMV BLD AUTO: 9.4 FL (ref 9.4–12.3)
POTASSIUM SERPL-SCNC: 4.3 MMOL/L (ref 3.5–5.1)
RBC # BLD AUTO: 2.18 M/UL (ref 4.23–5.6)
SERVICE CMNT-IMP: ABNORMAL
SERVICE CMNT-IMP: ABNORMAL
SERVICE CMNT-IMP: NORMAL
SODIUM SERPL-SCNC: 145 MMOL/L (ref 136–145)
TIBC SERPL-MCNC: ABNORMAL UG/DL (ref 240–450)
UIBC SERPL-MCNC: <16.8 UG/DL (ref 112–347)
WBC # BLD AUTO: 20.5 K/UL (ref 4.3–11.1)

## 2025-05-26 PROCEDURE — 2580000003 HC RX 258: Performed by: INTERNAL MEDICINE

## 2025-05-26 PROCEDURE — 36430 TRANSFUSION BLD/BLD COMPNT: CPT

## 2025-05-26 PROCEDURE — 2720000010 HC SURG SUPPLY STERILE: Performed by: INTERNAL MEDICINE

## 2025-05-26 PROCEDURE — 6360000002 HC RX W HCPCS: Performed by: NURSE ANESTHETIST, CERTIFIED REGISTERED

## 2025-05-26 PROCEDURE — 2500000003 HC RX 250 WO HCPCS: Performed by: STUDENT IN AN ORGANIZED HEALTH CARE EDUCATION/TRAINING PROGRAM

## 2025-05-26 PROCEDURE — 2580000003 HC RX 258: Performed by: STUDENT IN AN ORGANIZED HEALTH CARE EDUCATION/TRAINING PROGRAM

## 2025-05-26 PROCEDURE — P9016 RBC LEUKOCYTES REDUCED: HCPCS

## 2025-05-26 PROCEDURE — 83540 ASSAY OF IRON: CPT

## 2025-05-26 PROCEDURE — 6360000002 HC RX W HCPCS: Performed by: INTERNAL MEDICINE

## 2025-05-26 PROCEDURE — 6360000002 HC RX W HCPCS: Performed by: STUDENT IN AN ORGANIZED HEALTH CARE EDUCATION/TRAINING PROGRAM

## 2025-05-26 PROCEDURE — 3600000004 HC SURGERY LEVEL 4 BASE: Performed by: INTERNAL MEDICINE

## 2025-05-26 PROCEDURE — 80048 BASIC METABOLIC PNL TOTAL CA: CPT

## 2025-05-26 PROCEDURE — 3600000014 HC SURGERY LEVEL 4 ADDTL 15MIN: Performed by: INTERNAL MEDICINE

## 2025-05-26 PROCEDURE — 6370000000 HC RX 637 (ALT 250 FOR IP): Performed by: STUDENT IN AN ORGANIZED HEALTH CARE EDUCATION/TRAINING PROGRAM

## 2025-05-26 PROCEDURE — 82728 ASSAY OF FERRITIN: CPT

## 2025-05-26 PROCEDURE — 7100000010 HC PHASE II RECOVERY - FIRST 15 MIN: Performed by: INTERNAL MEDICINE

## 2025-05-26 PROCEDURE — 3700000001 HC ADD 15 MINUTES (ANESTHESIA): Performed by: INTERNAL MEDICINE

## 2025-05-26 PROCEDURE — 2500000003 HC RX 250 WO HCPCS: Performed by: NURSE ANESTHETIST, CERTIFIED REGISTERED

## 2025-05-26 PROCEDURE — 85025 COMPLETE CBC W/AUTO DIFF WBC: CPT

## 2025-05-26 PROCEDURE — 85014 HEMATOCRIT: CPT

## 2025-05-26 PROCEDURE — 2709999900 HC NON-CHARGEABLE SUPPLY: Performed by: INTERNAL MEDICINE

## 2025-05-26 PROCEDURE — 82962 GLUCOSE BLOOD TEST: CPT

## 2025-05-26 PROCEDURE — 36415 COLL VENOUS BLD VENIPUNCTURE: CPT

## 2025-05-26 PROCEDURE — 1100000003 HC PRIVATE W/ TELEMETRY

## 2025-05-26 PROCEDURE — 7100000011 HC PHASE II RECOVERY - ADDTL 15 MIN: Performed by: INTERNAL MEDICINE

## 2025-05-26 PROCEDURE — 85018 HEMOGLOBIN: CPT

## 2025-05-26 PROCEDURE — 3700000000 HC ANESTHESIA ATTENDED CARE: Performed by: INTERNAL MEDICINE

## 2025-05-26 PROCEDURE — 83550 IRON BINDING TEST: CPT

## 2025-05-26 PROCEDURE — 2500000003 HC RX 250 WO HCPCS: Performed by: PHYSICIAN ASSISTANT

## 2025-05-26 PROCEDURE — 83036 HEMOGLOBIN GLYCOSYLATED A1C: CPT

## 2025-05-26 PROCEDURE — 0D568ZZ DESTRUCTION OF STOMACH, VIA NATURAL OR ARTIFICIAL OPENING ENDOSCOPIC: ICD-10-PCS | Performed by: INTERNAL MEDICINE

## 2025-05-26 RX ORDER — NALOXONE HYDROCHLORIDE 0.4 MG/ML
INJECTION, SOLUTION INTRAMUSCULAR; INTRAVENOUS; SUBCUTANEOUS PRN
Status: DISCONTINUED | OUTPATIENT
Start: 2025-05-26 | End: 2025-05-26 | Stop reason: HOSPADM

## 2025-05-26 RX ORDER — SODIUM CHLORIDE 9 MG/ML
INJECTION, SOLUTION INTRAVENOUS CONTINUOUS
Status: ACTIVE | OUTPATIENT
Start: 2025-05-26 | End: 2025-05-26

## 2025-05-26 RX ORDER — SODIUM CHLORIDE 0.9 % (FLUSH) 0.9 %
5-40 SYRINGE (ML) INJECTION EVERY 12 HOURS SCHEDULED
Status: DISCONTINUED | OUTPATIENT
Start: 2025-05-26 | End: 2025-05-26 | Stop reason: HOSPADM

## 2025-05-26 RX ORDER — IBUPROFEN 600 MG/1
1 TABLET ORAL PRN
Status: DISCONTINUED | OUTPATIENT
Start: 2025-05-26 | End: 2025-05-26 | Stop reason: HOSPADM

## 2025-05-26 RX ORDER — ONDANSETRON 2 MG/ML
4 INJECTION INTRAMUSCULAR; INTRAVENOUS
Status: DISCONTINUED | OUTPATIENT
Start: 2025-05-26 | End: 2025-05-26 | Stop reason: HOSPADM

## 2025-05-26 RX ORDER — SODIUM CHLORIDE 9 MG/ML
INJECTION, SOLUTION INTRAVENOUS PRN
Status: DISCONTINUED | OUTPATIENT
Start: 2025-05-26 | End: 2025-05-26 | Stop reason: HOSPADM

## 2025-05-26 RX ORDER — LIDOCAINE HYDROCHLORIDE 20 MG/ML
INJECTION, SOLUTION EPIDURAL; INFILTRATION; INTRACAUDAL; PERINEURAL
Status: DISCONTINUED | OUTPATIENT
Start: 2025-05-26 | End: 2025-05-26 | Stop reason: SDUPTHER

## 2025-05-26 RX ORDER — SODIUM CHLORIDE 9 MG/ML
INJECTION, SOLUTION INTRAVENOUS PRN
Status: COMPLETED | OUTPATIENT
Start: 2025-05-26 | End: 2025-05-26

## 2025-05-26 RX ORDER — EPINEPHRINE IN SOD CHLOR,ISO 1 MG/10 ML
SYRINGE (ML) INTRAVENOUS PRN
Status: DISCONTINUED | OUTPATIENT
Start: 2025-05-26 | End: 2025-05-26 | Stop reason: ALTCHOICE

## 2025-05-26 RX ORDER — LEFLUNOMIDE 10 MG/1
10 TABLET ORAL DAILY
Status: DISCONTINUED | OUTPATIENT
Start: 2025-05-27 | End: 2025-05-29 | Stop reason: HOSPADM

## 2025-05-26 RX ORDER — SODIUM CHLORIDE 0.9 % (FLUSH) 0.9 %
5-40 SYRINGE (ML) INJECTION PRN
Status: DISCONTINUED | OUTPATIENT
Start: 2025-05-26 | End: 2025-05-26 | Stop reason: HOSPADM

## 2025-05-26 RX ORDER — MORPHINE SULFATE 2 MG/ML
2 INJECTION, SOLUTION INTRAMUSCULAR; INTRAVENOUS ONCE
Status: COMPLETED | OUTPATIENT
Start: 2025-05-26 | End: 2025-05-26

## 2025-05-26 RX ORDER — SUCRALFATE 1 G/1
1 TABLET ORAL
Status: DISCONTINUED | OUTPATIENT
Start: 2025-05-26 | End: 2025-05-29 | Stop reason: HOSPADM

## 2025-05-26 RX ORDER — SODIUM CHLORIDE, SODIUM LACTATE, POTASSIUM CHLORIDE, CALCIUM CHLORIDE 600; 310; 30; 20 MG/100ML; MG/100ML; MG/100ML; MG/100ML
INJECTION, SOLUTION INTRAVENOUS CONTINUOUS
Status: DISCONTINUED | OUTPATIENT
Start: 2025-05-26 | End: 2025-05-26 | Stop reason: HOSPADM

## 2025-05-26 RX ORDER — PROPOFOL 10 MG/ML
INJECTION, EMULSION INTRAVENOUS
Status: DISCONTINUED | OUTPATIENT
Start: 2025-05-26 | End: 2025-05-26 | Stop reason: SDUPTHER

## 2025-05-26 RX ORDER — DEXTROSE MONOHYDRATE 100 MG/ML
INJECTION, SOLUTION INTRAVENOUS CONTINUOUS PRN
Status: DISCONTINUED | OUTPATIENT
Start: 2025-05-26 | End: 2025-05-26 | Stop reason: HOSPADM

## 2025-05-26 RX ADMIN — PROPOFOL 30 MG: 10 INJECTION, EMULSION INTRAVENOUS at 10:07

## 2025-05-26 RX ADMIN — PROPOFOL 10 MG: 10 INJECTION, EMULSION INTRAVENOUS at 10:11

## 2025-05-26 RX ADMIN — EPHEDRINE SULFATE 5 MG: 5 INJECTION INTRAVENOUS at 09:56

## 2025-05-26 RX ADMIN — SODIUM CHLORIDE 40 MG: 9 INJECTION, SOLUTION INTRAMUSCULAR; INTRAVENOUS; SUBCUTANEOUS at 20:29

## 2025-05-26 RX ADMIN — MORPHINE SULFATE 2 MG: 2 INJECTION, SOLUTION INTRAMUSCULAR; INTRAVENOUS at 18:17

## 2025-05-26 RX ADMIN — EPHEDRINE SULFATE 15 MG: 5 INJECTION INTRAVENOUS at 10:03

## 2025-05-26 RX ADMIN — PHENYLEPHRINE HYDROCHLORIDE 300 MCG: 0.1 INJECTION, SOLUTION INTRAVENOUS at 09:58

## 2025-05-26 RX ADMIN — SODIUM CHLORIDE, PRESERVATIVE FREE 10 ML: 5 INJECTION INTRAVENOUS at 08:14

## 2025-05-26 RX ADMIN — PHENYLEPHRINE HYDROCHLORIDE 300 MCG: 0.1 INJECTION, SOLUTION INTRAVENOUS at 10:01

## 2025-05-26 RX ADMIN — PHENYLEPHRINE HYDROCHLORIDE 200 MCG: 0.1 INJECTION, SOLUTION INTRAVENOUS at 09:56

## 2025-05-26 RX ADMIN — PREDNISONE 5 MG: 5 TABLET ORAL at 20:29

## 2025-05-26 RX ADMIN — EPHEDRINE SULFATE 15 MG: 5 INJECTION INTRAVENOUS at 10:11

## 2025-05-26 RX ADMIN — PHENYLEPHRINE HYDROCHLORIDE 200 MCG: 0.1 INJECTION, SOLUTION INTRAVENOUS at 10:03

## 2025-05-26 RX ADMIN — SODIUM CHLORIDE: 0.9 INJECTION, SOLUTION INTRAVENOUS at 11:55

## 2025-05-26 RX ADMIN — PROPOFOL 50 MG: 10 INJECTION, EMULSION INTRAVENOUS at 09:55

## 2025-05-26 RX ADMIN — PROPOFOL 80 MG: 10 INJECTION, EMULSION INTRAVENOUS at 09:54

## 2025-05-26 RX ADMIN — SUCRALFATE 1 G: 1 TABLET ORAL at 16:33

## 2025-05-26 RX ADMIN — SODIUM CHLORIDE, PRESERVATIVE FREE 10 ML: 5 INJECTION INTRAVENOUS at 20:29

## 2025-05-26 RX ADMIN — PHENYLEPHRINE HYDROCHLORIDE 250 MCG: 0.1 INJECTION, SOLUTION INTRAVENOUS at 10:09

## 2025-05-26 RX ADMIN — TRAMADOL HYDROCHLORIDE 50 MG: 50 TABLET, COATED ORAL at 20:42

## 2025-05-26 RX ADMIN — LIDOCAINE HYDROCHLORIDE 100 MG: 20 INJECTION, SOLUTION EPIDURAL; INFILTRATION; INTRACAUDAL; PERINEURAL at 09:54

## 2025-05-26 RX ADMIN — SODIUM CHLORIDE: 9 INJECTION, SOLUTION INTRAVENOUS at 09:45

## 2025-05-26 RX ADMIN — PROPOFOL 30 MG: 10 INJECTION, EMULSION INTRAVENOUS at 10:01

## 2025-05-26 RX ADMIN — EPHEDRINE SULFATE 15 MG: 5 INJECTION INTRAVENOUS at 10:07

## 2025-05-26 ASSESSMENT — PAIN - FUNCTIONAL ASSESSMENT
PAIN_FUNCTIONAL_ASSESSMENT: NONE - DENIES PAIN
PAIN_FUNCTIONAL_ASSESSMENT: PREVENTS OR INTERFERES SOME ACTIVE ACTIVITIES AND ADLS

## 2025-05-26 ASSESSMENT — PAIN DESCRIPTION - FREQUENCY: FREQUENCY: CONTINUOUS

## 2025-05-26 ASSESSMENT — PAIN SCALES - GENERAL
PAINLEVEL_OUTOF10: 7
PAINLEVEL_OUTOF10: 6
PAINLEVEL_OUTOF10: 2

## 2025-05-26 ASSESSMENT — PAIN DESCRIPTION - DESCRIPTORS
DESCRIPTORS: ACHING;THROBBING
DESCRIPTORS: ACHING;THROBBING

## 2025-05-26 ASSESSMENT — PAIN DESCRIPTION - ORIENTATION: ORIENTATION: RIGHT;LEFT

## 2025-05-26 ASSESSMENT — PAIN DESCRIPTION - LOCATION: LOCATION: OTHER (COMMENT)

## 2025-05-26 ASSESSMENT — PAIN DESCRIPTION - PAIN TYPE: TYPE: CHRONIC PAIN

## 2025-05-26 ASSESSMENT — PAIN DESCRIPTION - ONSET: ONSET: ON-GOING

## 2025-05-26 NOTE — PLAN OF CARE
Problem: Chronic Conditions and Co-morbidities  Goal: Patient's chronic conditions and co-morbidity symptoms are monitored and maintained or improved  5/26/2025 0313 by Marge Bustillo RN  Outcome: Progressing  5/25/2025 1751 by Katherine Luther RN  Outcome: Progressing     Problem: Discharge Planning  Goal: Discharge to home or other facility with appropriate resources  5/26/2025 0313 by Marge Bustillo RN  Outcome: Progressing  5/25/2025 1751 by Katherine Luther RN  Outcome: Progressing     Problem: Pain  Goal: Verbalizes/displays adequate comfort level or baseline comfort level  5/26/2025 0313 by Marge Bustillo RN  Outcome: Progressing  5/25/2025 1751 by Katherine Luther RN  Outcome: Progressing     Problem: Skin/Tissue Integrity  Goal: Skin integrity remains intact  Description: 1.  Monitor for areas of redness and/or skin breakdown2.  Assess vascular access sites hourly3.  Every 4-6 hours minimum:  Change oxygen saturation probe site4.  Every 4-6 hours:  If on nasal continuous positive airway pressure, respiratory therapy assess nares and determine need for appliance change or resting period  5/26/2025 0313 by Marge Bustillo RN  Outcome: Progressing  5/25/2025 1751 by Katherine Luther RN  Outcome: Progressing     Problem: Safety - Adult  Goal: Free from fall injury  5/26/2025 0313 by Marge Bustillo RN  Outcome: Progressing  5/25/2025 1751 by Katherine Luther RN  Outcome: Progressing     Problem: ABCDS Injury Assessment  Goal: Absence of physical injury  5/26/2025 0313 by Marge Bustillo RN  Outcome: Progressing  5/25/2025 1751 by Katherine Luther RN  Outcome: Progressing

## 2025-05-26 NOTE — CONSULTS
GASTROENTEROLOGY CONSULT NOTE     CC: GI bleed     HPI:   Ney Allen is 79 y.o. y/o male presenting with complaint of generalized weakness. Found to have hgb 5.1 in the ER. He endorses  black tarry stools for the last two weeks. He endorses daily Advil use for rheumatoid arthritis related pain. Denies abdominal pain, N/V. No prior history of GI bleeding. No prior EGD.       PE:   Vitals:    05/26/25 0809   BP: (!) 110/49   Pulse:    Resp:    Temp:    SpO2:       General:  The patient is in no acute distress.    Respiratory: Respiratory effort is normal.    Cardiovascular:  Regular rate and rhythm.     Abdomen:  Soft, non tender to palpation.    Extremities: No edema bilaterally. No erythema  Neurologic:  Alert and oriented x3.       Labs:  Lab Results   Component Value Date    HGB 6.4 (LL) 05/26/2025    WBC 20.5 (H) 05/26/2025     05/26/2025    MCV 94.0 05/26/2025    IRON 69 03/01/2024    FERRITIN 18 03/01/2024    TIBC 317 03/01/2024    CREATININE 1.68 (H) 05/26/2025    ALT 11 05/25/2025    AST 16 05/25/2025       Assessment/Plan:   GI bleed, melena, anemia   Excessive NSAID use     - EGD today   - IV PPI BID   - Transfuse for hgb < 7   - Keep NPO       Elsa Perez MD  [facility]

## 2025-05-26 NOTE — CONSENT
Informed Consent for Blood Component Transfusion Note    I have discussed with the patient the rationale for blood component transfusion; its benefits in treating or preventing fatigue, organ damage, or death; and its risk which includes mild transfusion reactions, rare risk of blood borne infection, or more serious but rare reactions. I have discussed the alternatives to transfusion, including the risk and consequences of not receiving transfusion. The patient had an opportunity to ask questions and had agreed to proceed with transfusion of blood components.    Electronically signed by EMMA Astorga NP on 5/25/25 at 9:01 PM EDT

## 2025-05-26 NOTE — PLAN OF CARE
EGD complete, see procedure report. Significant for gastric ulcer, treated with epi, cautery, and purastat. No bleeding at the end of procedure. Non bleeding small esophageal varices.     - Continue twice daily PPI for 8 weeks   - Carafate 1 gm TID for 2 weeks   - Repeat EGD in 8 weeks   - H pylori stool antigen ordered   - Check iron studies and ferritin level, if consistent with SHANITA recommend dose of IV iron prior to discharge   - Avoid all NSAIDs   - Clear liquid diet , advance as tolerated     GI team will sign off, call with questions or concerns.     Elsa Perez MD  [facility]

## 2025-05-26 NOTE — PERIOP NOTE
TRANSFER - OUT REPORT:    Verbal report given to John VALENZUELA on Ney Allen  being transferred to [facility] for routine post-op       Report consisted of patient's Situation, Background, Assessment and   Recommendations(SBAR).     Information from the following report(s) Adult Overview, Surgery Report, MAR, and Recent Results was reviewed with the receiving nurse.           Lines:   Peripheral IV 05/25/25 Posterior;Right Forearm (Active)   Site Assessment Clean, dry & intact 05/26/25 1051   Line Status Infusing 05/26/25 1051   Line Care Connections checked and tightened 05/26/25 1051   Phlebitis Assessment No symptoms 05/26/25 1051   Infiltration Assessment 0 05/26/25 1051   Alcohol Cap Used No 05/26/25 1051   Dressing Status Clean, dry & intact 05/26/25 1051   Dressing Type Transparent 05/26/25 1051       Peripheral IV 05/25/25 Left Antecubital (Active)   Site Assessment Clean, dry & intact 05/26/25 1051   Line Status Infusing 05/26/25 0842   Line Care Connections checked and tightened 05/26/25 1051   Phlebitis Assessment No symptoms 05/26/25 1051   Infiltration Assessment 0 05/26/25 1051   Alcohol Cap Used No 05/26/25 1051   Dressing Status Clean, dry & intact 05/26/25 1051   Dressing Type Transparent 05/26/25 1051        Opportunity for questions and clarification was provided.      Patient transported with:  Tech      A

## 2025-05-26 NOTE — ANESTHESIA POSTPROCEDURE EVALUATION
Department of Anesthesiology  Postprocedure Note    Patient: Ney Allen  MRN: [id number]  YOB: 1945  Date of evaluation: 5/26/2025    Procedure Summary       Date: 05/26/25 Room / Location: [facility] MAIN OR 08 / [facility] MAIN OR    Anesthesia Start: 0945 Anesthesia Stop: 1024    Procedure: ESOPHAGOGASTRODUODENOSCOPY/Gold probe/Epi injection/Purastat (Upper GI Region) Diagnosis:       Melena      (Melena [K92.1])    Providers: Elsa Perez MD Responsible Provider: Wendy Ochoa MD    Anesthesia Type: TIVA ASA Status: 3 - Emergent            Anesthesia Type: TIVA    Adrian Phase I: Adrian Score: 9    Adrian Phase II: Adrian Score: 10    Anesthesia Post Evaluation    Patient location during evaluation: PACU  Patient participation: complete - patient participated  Level of consciousness: awake and alert  Airway patency: patent  Nausea: well controlled.  Cardiovascular status: acceptable.  Respiratory status: acceptable  Hydration status: stable  Pain management: adequate    No notable events documented.

## 2025-05-26 NOTE — ANESTHESIA PRE PROCEDURE
Department of Anesthesiology  Preprocedure Note       Name:  Ney Allen   Age:  79 y.o.  :  1945                                          MRN:  [id number]         Date:  2025      Surgeon: Surgeon(s):  Elsa ePrez MD    Procedure: Procedure(s):  ESOPHAGOGASTRODUODENOSCOPY    Medications prior to admission:   Prior to Admission medications    Medication Sig Start Date End Date Taking? Authorizing Provider   Insulin Degludec (TRESIBA FLEXTOUCH) 200 UNIT/ML SOPN Inject 40 units into skin every night.  Patient taking differently: Inject 37 Units as directed at bedtime 24  Yes Agnieszka Manzano APRN - NP   metFORMIN (GLUCOPHAGE) 1000 MG tablet Take 1 tablet by mouth 2 times daily (with meals) TAKE ONE TABLET BY MOUTH TWICE A DAY WITH MEALS 24  Yes Agnieszka Manzano APRN - NP   olmesartan-hydroCHLOROthiazide (BENICAR HCT) 40-25 MG per tablet Take 1 tablet by mouth daily 24  Yes Agnieszka Manzano APRN - NP   predniSONE (DELTASONE) 5 MG tablet Take 2 tablets by mouth Daily with food. 24  Yes Agnieszka Manzano APRN - NP   tamsulosin (FLOMAX) 0.4 MG capsule Take 1 capsule by mouth daily  Patient taking differently: Take 1 capsule by mouth Daily with supper 24  Yes Agnieszka Manzano APRN - NP   verapamil (VERELAN) 240 MG extended release capsule Take 1 capsule by mouth daily 24  Yes Agnieszka Manzano APRN - NP   traMADol (ULTRAM) 50 MG tablet Take 1 tablet by mouth every 6 hours as needed. 23  Yes ProviderClarisse MD   alendronate (FOSAMAX) 70 MG tablet every 7 days 1/10/23  Yes ProviderClarisse MD   Multiple Vitamin (MULTIVITAMIN ADULT PO) Take 1 tablet by mouth nightly   Yes ProviderClarisse MD   camphor-menthol-methyl salicylate (BENGAY ULTRA STRENGTH) 4-10-30 % CREA cream daily as needed 17  Yes Automatic Reconciliation, Ar   Cholecalciferol 50 MCG (2000 UT) CAPS Take 1 capsule by mouth daily 21  Yes Automatic Reconciliation, Ar
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
## 2025-05-27 LAB
ABO + RH BLD: NORMAL
ALBUMIN SERPL-MCNC: 2.5 G/DL (ref 3.2–4.6)
ALBUMIN/GLOB SERPL: 0.8 (ref 1–1.9)
ALP SERPL-CCNC: 68 U/L (ref 40–129)
ALT SERPL-CCNC: 11 U/L (ref 8–55)
ANION GAP SERPL CALC-SCNC: 10 MMOL/L (ref 7–16)
APPEARANCE UR: CLEAR
AST SERPL-CCNC: 22 U/L (ref 15–37)
BACTERIA URNS QL MICRO: 0 /HPF
BASOPHILS # BLD: 0.05 K/UL (ref 0–0.2)
BASOPHILS NFR BLD: 0.3 % (ref 0–2)
BILIRUB SERPL-MCNC: 0.4 MG/DL (ref 0–1.2)
BILIRUB UR QL: NEGATIVE
BLD PROD TYP BPU: NORMAL
BLOOD BANK BLOOD PRODUCT EXPIRATION DATE: NORMAL
BLOOD BANK CMNT PATIENT-IMP: NORMAL
BLOOD BANK DISPENSE STATUS: NORMAL
BLOOD BANK ISBT PRODUCT BLOOD TYPE: 5100
BLOOD BANK PRODUCT CODE: NORMAL
BLOOD BANK UNIT TYPE AND RH: NORMAL
BLOOD GROUP ANTIBODIES SERPL: NORMAL
BPU ID: NORMAL
BUN SERPL-MCNC: 38 MG/DL (ref 8–23)
CALCIUM SERPL-MCNC: 8.3 MG/DL (ref 8.8–10.2)
CASTS URNS QL MICRO: 0 /LPF
CHLORIDE SERPL-SCNC: 110 MMOL/L (ref 98–107)
CO2 SERPL-SCNC: 25 MMOL/L (ref 20–29)
COLOR UR: YELLOW
CREAT SERPL-MCNC: 1.29 MG/DL (ref 0.8–1.3)
CROSSMATCH RESULT: NORMAL
CRYSTALS URNS QL MICRO: 0 /LPF
DIFFERENTIAL METHOD BLD: ABNORMAL
EOSINOPHIL # BLD: 0.04 K/UL (ref 0–0.8)
EOSINOPHIL NFR BLD: 0.2 % (ref 0.5–7.8)
EPI CELLS #/AREA URNS HPF: ABNORMAL /HPF
ERYTHROCYTE [DISTWIDTH] IN BLOOD BY AUTOMATED COUNT: 15.9 % (ref 11.9–14.6)
GLOBULIN SER CALC-MCNC: 3.1 G/DL (ref 2.3–3.5)
GLUCOSE BLD STRIP.AUTO-MCNC: 105 MG/DL (ref 65–100)
GLUCOSE BLD STRIP.AUTO-MCNC: 174 MG/DL (ref 65–100)
GLUCOSE BLD STRIP.AUTO-MCNC: 183 MG/DL (ref 65–100)
GLUCOSE BLD STRIP.AUTO-MCNC: 198 MG/DL (ref 65–100)
GLUCOSE SERPL-MCNC: 118 MG/DL (ref 70–99)
GLUCOSE UR STRIP.AUTO-MCNC: NEGATIVE MG/DL
HCT VFR BLD AUTO: 24.2 % (ref 41.1–50.3)
HCT VFR BLD AUTO: 25 % (ref 41.1–50.3)
HCT VFR BLD AUTO: 25 % (ref 41.1–50.3)
HCT VFR BLD AUTO: 29.7 % (ref 41.1–50.3)
HGB BLD-MCNC: 8 G/DL (ref 13.6–17.2)
HGB BLD-MCNC: 8 G/DL (ref 13.6–17.2)
HGB BLD-MCNC: 8.2 G/DL (ref 13.6–17.2)
HGB BLD-MCNC: 9.5 G/DL (ref 13.6–17.2)
HGB UR QL STRIP: ABNORMAL
IMM GRANULOCYTES # BLD AUTO: 0.19 K/UL (ref 0–0.5)
IMM GRANULOCYTES NFR BLD AUTO: 1 % (ref 0–5)
KETONES UR QL STRIP.AUTO: NEGATIVE MG/DL
LEUKOCYTE ESTERASE UR QL STRIP.AUTO: NEGATIVE
LYMPHOCYTES # BLD: 1.15 K/UL (ref 0.5–4.6)
LYMPHOCYTES NFR BLD: 6 % (ref 13–44)
MAGNESIUM SERPL-MCNC: 1.6 MG/DL (ref 1.8–2.4)
MCH RBC QN AUTO: 29.6 PG (ref 26.1–32.9)
MCHC RBC AUTO-ENTMCNC: 33.1 G/DL (ref 31.4–35)
MCV RBC AUTO: 89.6 FL (ref 82–102)
MONOCYTES # BLD: 1.22 K/UL (ref 0.1–1.3)
MONOCYTES NFR BLD: 6.3 % (ref 4–12)
MUCOUS THREADS URNS QL MICRO: 0 /LPF
NEUTS SEG # BLD: 16.58 K/UL (ref 1.7–8.2)
NEUTS SEG NFR BLD: 86.2 % (ref 43–78)
NITRITE UR QL STRIP.AUTO: NEGATIVE
NRBC # BLD: 0.15 K/UL (ref 0–0.2)
OTHER OBSERVATIONS: ABNORMAL
PH UR STRIP: 6.5 (ref 5–9)
PLATELET # BLD AUTO: 317 K/UL (ref 150–450)
PMV BLD AUTO: 9.3 FL (ref 9.4–12.3)
POTASSIUM SERPL-SCNC: 4 MMOL/L (ref 3.5–5.1)
PROCALCITONIN SERPL-MCNC: 0.44 NG/ML (ref 0–0.1)
PROT SERPL-MCNC: 5.6 G/DL (ref 6.3–8.2)
PROT UR STRIP-MCNC: ABNORMAL MG/DL
RBC # BLD AUTO: 2.7 M/UL (ref 4.23–5.6)
RBC #/AREA URNS HPF: ABNORMAL /HPF
SERVICE CMNT-IMP: ABNORMAL
SODIUM SERPL-SCNC: 145 MMOL/L (ref 136–145)
SP GR UR REFRACTOMETRY: 1.01 (ref 1–1.02)
SPECIMEN EXP DATE BLD: NORMAL
UNIT DIVISION: 0
UNIT ISSUE DATE/TIME: NORMAL
URINE CULTURE IF INDICATED: ABNORMAL
UROBILINOGEN UR QL STRIP.AUTO: 1 EU/DL (ref 0.2–1)
WBC # BLD AUTO: 19.2 K/UL (ref 4.3–11.1)
WBC URNS QL MICRO: ABNORMAL /HPF

## 2025-05-27 PROCEDURE — 87338 HPYLORI STOOL AG IA: CPT

## 2025-05-27 PROCEDURE — 83735 ASSAY OF MAGNESIUM: CPT

## 2025-05-27 PROCEDURE — 97535 SELF CARE MNGMENT TRAINING: CPT

## 2025-05-27 PROCEDURE — 80053 COMPREHEN METABOLIC PANEL: CPT

## 2025-05-27 PROCEDURE — 81001 URINALYSIS AUTO W/SCOPE: CPT

## 2025-05-27 PROCEDURE — 82962 GLUCOSE BLOOD TEST: CPT

## 2025-05-27 PROCEDURE — 36415 COLL VENOUS BLD VENIPUNCTURE: CPT

## 2025-05-27 PROCEDURE — 2580000003 HC RX 258: Performed by: STUDENT IN AN ORGANIZED HEALTH CARE EDUCATION/TRAINING PROGRAM

## 2025-05-27 PROCEDURE — 1100000000 HC RM PRIVATE

## 2025-05-27 PROCEDURE — 6360000002 HC RX W HCPCS: Performed by: STUDENT IN AN ORGANIZED HEALTH CARE EDUCATION/TRAINING PROGRAM

## 2025-05-27 PROCEDURE — 2500000003 HC RX 250 WO HCPCS: Performed by: STUDENT IN AN ORGANIZED HEALTH CARE EDUCATION/TRAINING PROGRAM

## 2025-05-27 PROCEDURE — 6370000000 HC RX 637 (ALT 250 FOR IP): Performed by: STUDENT IN AN ORGANIZED HEALTH CARE EDUCATION/TRAINING PROGRAM

## 2025-05-27 PROCEDURE — 85018 HEMOGLOBIN: CPT

## 2025-05-27 PROCEDURE — 84145 PROCALCITONIN (PCT): CPT

## 2025-05-27 PROCEDURE — 97530 THERAPEUTIC ACTIVITIES: CPT

## 2025-05-27 PROCEDURE — 97165 OT EVAL LOW COMPLEX 30 MIN: CPT

## 2025-05-27 PROCEDURE — 97161 PT EVAL LOW COMPLEX 20 MIN: CPT

## 2025-05-27 PROCEDURE — 85025 COMPLETE CBC W/AUTO DIFF WBC: CPT

## 2025-05-27 PROCEDURE — 97112 NEUROMUSCULAR REEDUCATION: CPT

## 2025-05-27 PROCEDURE — 85014 HEMATOCRIT: CPT

## 2025-05-27 RX ORDER — MAGNESIUM SULFATE IN WATER 40 MG/ML
2000 INJECTION, SOLUTION INTRAVENOUS ONCE
Status: COMPLETED | OUTPATIENT
Start: 2025-05-27 | End: 2025-05-27

## 2025-05-27 RX ORDER — MINERAL OIL/HYDROPHIL PETROLAT
OINTMENT (GRAM) TOPICAL DAILY
Status: DISCONTINUED | OUTPATIENT
Start: 2025-05-27 | End: 2025-05-29 | Stop reason: HOSPADM

## 2025-05-27 RX ORDER — INSULIN GLARGINE 100 [IU]/ML
0.1 INJECTION, SOLUTION SUBCUTANEOUS DAILY
Status: DISCONTINUED | OUTPATIENT
Start: 2025-05-28 | End: 2025-05-29 | Stop reason: HOSPADM

## 2025-05-27 RX ORDER — CARBOXYMETHYLCELLULOSE SODIUM 10 MG/ML
1 GEL OPHTHALMIC 3 TIMES DAILY PRN
Status: DISCONTINUED | OUTPATIENT
Start: 2025-05-27 | End: 2025-05-29 | Stop reason: HOSPADM

## 2025-05-27 RX ADMIN — INSULIN LISPRO 1 UNITS: 100 INJECTION, SOLUTION INTRAVENOUS; SUBCUTANEOUS at 18:11

## 2025-05-27 RX ADMIN — SODIUM CHLORIDE 40 MG: 9 INJECTION, SOLUTION INTRAMUSCULAR; INTRAVENOUS; SUBCUTANEOUS at 08:14

## 2025-05-27 RX ADMIN — SODIUM CHLORIDE, PRESERVATIVE FREE 10 ML: 5 INJECTION INTRAVENOUS at 08:19

## 2025-05-27 RX ADMIN — SUCRALFATE 1 G: 1 TABLET ORAL at 11:53

## 2025-05-27 RX ADMIN — TRAMADOL HYDROCHLORIDE 50 MG: 50 TABLET, COATED ORAL at 21:10

## 2025-05-27 RX ADMIN — WHITE PETROLATUM 41 % TOPICAL OINTMENT: at 22:27

## 2025-05-27 RX ADMIN — MAGNESIUM SULFATE HEPTAHYDRATE 2000 MG: 40 INJECTION, SOLUTION INTRAVENOUS at 16:16

## 2025-05-27 RX ADMIN — PREDNISONE 5 MG: 5 TABLET ORAL at 08:14

## 2025-05-27 RX ADMIN — SUCRALFATE 1 G: 1 TABLET ORAL at 06:53

## 2025-05-27 RX ADMIN — SODIUM CHLORIDE 40 MG: 9 INJECTION, SOLUTION INTRAMUSCULAR; INTRAVENOUS; SUBCUTANEOUS at 20:56

## 2025-05-27 RX ADMIN — INSULIN LISPRO 1 UNITS: 100 INJECTION, SOLUTION INTRAVENOUS; SUBCUTANEOUS at 20:57

## 2025-05-27 RX ADMIN — TAMSULOSIN HYDROCHLORIDE 0.4 MG: 0.4 CAPSULE ORAL at 08:14

## 2025-05-27 RX ADMIN — LEFLUNOMIDE 10 MG: 10 TABLET ORAL at 08:14

## 2025-05-27 RX ADMIN — TRAMADOL HYDROCHLORIDE 50 MG: 50 TABLET, COATED ORAL at 12:00

## 2025-05-27 RX ADMIN — SUCRALFATE 1 G: 1 TABLET ORAL at 16:10

## 2025-05-27 RX ADMIN — PREDNISONE 5 MG: 5 TABLET ORAL at 20:56

## 2025-05-27 RX ADMIN — SODIUM CHLORIDE, PRESERVATIVE FREE 20 ML: 5 INJECTION INTRAVENOUS at 21:02

## 2025-05-27 ASSESSMENT — PAIN DESCRIPTION - LOCATION
LOCATION: OTHER (COMMENT)
LOCATION: OTHER (COMMENT)

## 2025-05-27 ASSESSMENT — PAIN SCALES - GENERAL
PAINLEVEL_OUTOF10: 0
PAINLEVEL_OUTOF10: 7
PAINLEVEL_OUTOF10: 7
PAINLEVEL_OUTOF10: 0

## 2025-05-27 ASSESSMENT — PAIN DESCRIPTION - DESCRIPTORS
DESCRIPTORS: ACHING
DESCRIPTORS: ACHING

## 2025-05-27 NOTE — WOUND CARE
Scars on bilateral buttocks not open at this time, pink to white scars full epithelization. Patient states had sores there from time to time, heal and come back, scars are consistent with friction versus moisture. He has had loose stools here, recommend to use blue prevent cream bid and prn     Bilateral lower legs with scaling and mild edema consistent with chronic venous stasis, light emollient and elevating legs would help.

## 2025-05-27 NOTE — PLAN OF CARE
Problem: Chronic Conditions and Co-morbidities  Goal: Patient's chronic conditions and co-morbidity symptoms are monitored and maintained or improved  Outcome: Progressing  Flowsheets (Taken 5/26/2025 2035)  Care Plan - Patient's Chronic Conditions and Co-Morbidity Symptoms are Monitored and Maintained or Improved:   Monitor and assess patient's chronic conditions and comorbid symptoms for stability, deterioration, or improvement   Collaborate with multidisciplinary team to address chronic and comorbid conditions and prevent exacerbation or deterioration   Update acute care plan with appropriate goals if chronic or comorbid symptoms are exacerbated and prevent overall improvement and discharge     Problem: Discharge Planning  Goal: Discharge to home or other facility with appropriate resources  Outcome: Progressing  Flowsheets (Taken 5/26/2025 2035)  Discharge to home or other facility with appropriate resources:   Identify barriers to discharge with patient and caregiver   Arrange for needed discharge resources and transportation as appropriate   Identify discharge learning needs (meds, wound care, etc)     Problem: Pain  Goal: Verbalizes/displays adequate comfort level or baseline comfort level  Outcome: Progressing     Problem: Skin/Tissue Integrity  Goal: Skin integrity remains intact  Description: 1.  Monitor for areas of redness and/or skin breakdown2.  Assess vascular access sites hourly3.  Every 4-6 hours minimum:  Change oxygen saturation probe site4.  Every 4-6 hours:  If on nasal continuous positive airway pressure, respiratory therapy assess nares and determine need for appliance change or resting period  Outcome: Progressing  Flowsheets  Taken 5/26/2025 2035  Skin Integrity Remains Intact:   Monitor for areas of redness and/or skin breakdown   Assess vascular access sites hourly   Every 4-6 hours minimum:  Change oxygen saturation probe site   Turn and reposition as indicated   Assess need for

## 2025-05-27 NOTE — CARE COORDINATION
RNCM met with patient in room 209 to discuss discharge planning.    Patient lives with spouse in a one level home with ramped entry. Patient completes ADLs independently at baseline and ambulates with a rolling walker. Patient has no current home care services. Demographics and PCP verified.    PT/OT recommend home health at discharge home health choices list provided for review. CM following.       05/27/25 0969   Service Assessment   Patient Orientation Alert and Oriented   Cognition Alert   History Provided By Patient   Primary Caregiver Self   Accompanied By/Relationship n/a   Support Systems Spouse/Significant Other;Family Members;Children   Patient's Healthcare Decision Maker is: Legal Next of Kin   PCP Verified by CM Yes   Last Visit to PCP Within last 3 months   Prior Functional Level Independent in ADLs/IADLs   Current Functional Level Independent in ADLs/IADLs   Can patient return to prior living arrangement Yes   Ability to make needs known: Good   Family able to assist with home care needs: Yes   Would you like for me to discuss the discharge plan with any other family members/significant others, and if so, who? No   Financial Resources Medicare   Community Resources None   Social/Functional History   Lives With Spouse   Type of Home House   Home Layout One level   Home Access Ramped entrance   Home Equipment Walker - Rolling   Receives Help From Family   Prior Level of Assist for ADLs Independent   Ambulation Assistance Independent   Prior Level of Assist for Transfers Independent   Active  No   Mode of Transportation Family   Occupation Retired   Discharge Planning   Type of Residence House   Living Arrangements Children   Current Services Prior To Admission None   DME Ordered? No   Potential Assistance Purchasing Medications No   Patient expects to be discharged to: House   One/Two Story Residence One story   History of falls? 1

## 2025-05-27 NOTE — ACP (ADVANCE CARE PLANNING)
Advance Care Planning   Healthcare Decision Maker:    Primary Decision Maker: AlejandroMaria Guadalupe - [facility] - [phone]    Click here to complete Healthcare Decision Makers including selection of the Healthcare Decision Maker Relationship (ie \"Primary\").
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
## 2025-05-28 LAB
ANION GAP SERPL CALC-SCNC: 9 MMOL/L (ref 7–16)
BASOPHILS # BLD: 0.04 K/UL (ref 0–0.2)
BASOPHILS NFR BLD: 0.2 % (ref 0–2)
BUN SERPL-MCNC: 25 MG/DL (ref 8–23)
CALCIUM SERPL-MCNC: 8.8 MG/DL (ref 8.8–10.2)
CHLORIDE SERPL-SCNC: 105 MMOL/L (ref 98–107)
CO2 SERPL-SCNC: 27 MMOL/L (ref 20–29)
CREAT SERPL-MCNC: 1.29 MG/DL (ref 0.8–1.3)
DIFFERENTIAL METHOD BLD: ABNORMAL
EOSINOPHIL # BLD: 0.08 K/UL (ref 0–0.8)
EOSINOPHIL NFR BLD: 0.4 % (ref 0.5–7.8)
ERYTHROCYTE [DISTWIDTH] IN BLOOD BY AUTOMATED COUNT: 15.7 % (ref 11.9–14.6)
GLUCOSE BLD STRIP.AUTO-MCNC: 141 MG/DL (ref 65–100)
GLUCOSE BLD STRIP.AUTO-MCNC: 183 MG/DL (ref 65–100)
GLUCOSE BLD STRIP.AUTO-MCNC: 189 MG/DL (ref 65–100)
GLUCOSE BLD STRIP.AUTO-MCNC: 192 MG/DL (ref 65–100)
GLUCOSE SERPL-MCNC: 155 MG/DL (ref 70–99)
HCT VFR BLD AUTO: 27.1 % (ref 41.1–50.3)
HGB BLD-MCNC: 8.5 G/DL (ref 13.6–17.2)
IMM GRANULOCYTES # BLD AUTO: 0.11 K/UL (ref 0–0.5)
IMM GRANULOCYTES NFR BLD AUTO: 0.6 % (ref 0–5)
LYMPHOCYTES # BLD: 1.17 K/UL (ref 0.5–4.6)
LYMPHOCYTES NFR BLD: 6.4 % (ref 13–44)
MCH RBC QN AUTO: 29.6 PG (ref 26.1–32.9)
MCHC RBC AUTO-ENTMCNC: 31.4 G/DL (ref 31.4–35)
MCV RBC AUTO: 94.4 FL (ref 82–102)
MONOCYTES # BLD: 1.12 K/UL (ref 0.1–1.3)
MONOCYTES NFR BLD: 6.1 % (ref 4–12)
NEUTS SEG # BLD: 15.72 K/UL (ref 1.7–8.2)
NEUTS SEG NFR BLD: 86.3 % (ref 43–78)
NRBC # BLD: 0.03 K/UL (ref 0–0.2)
PLATELET # BLD AUTO: 354 K/UL (ref 150–450)
PMV BLD AUTO: 9 FL (ref 9.4–12.3)
POTASSIUM SERPL-SCNC: 4.2 MMOL/L (ref 3.5–5.1)
RBC # BLD AUTO: 2.87 M/UL (ref 4.23–5.6)
SERVICE CMNT-IMP: ABNORMAL
SODIUM SERPL-SCNC: 141 MMOL/L (ref 136–145)
WBC # BLD AUTO: 18.2 K/UL (ref 4.3–11.1)

## 2025-05-28 PROCEDURE — 2580000003 HC RX 258: Performed by: STUDENT IN AN ORGANIZED HEALTH CARE EDUCATION/TRAINING PROGRAM

## 2025-05-28 PROCEDURE — 80048 BASIC METABOLIC PNL TOTAL CA: CPT

## 2025-05-28 PROCEDURE — 1100000000 HC RM PRIVATE

## 2025-05-28 PROCEDURE — 2500000003 HC RX 250 WO HCPCS: Performed by: STUDENT IN AN ORGANIZED HEALTH CARE EDUCATION/TRAINING PROGRAM

## 2025-05-28 PROCEDURE — 85025 COMPLETE CBC W/AUTO DIFF WBC: CPT

## 2025-05-28 PROCEDURE — 6360000002 HC RX W HCPCS: Performed by: STUDENT IN AN ORGANIZED HEALTH CARE EDUCATION/TRAINING PROGRAM

## 2025-05-28 PROCEDURE — 6370000000 HC RX 637 (ALT 250 FOR IP): Performed by: STUDENT IN AN ORGANIZED HEALTH CARE EDUCATION/TRAINING PROGRAM

## 2025-05-28 PROCEDURE — 36415 COLL VENOUS BLD VENIPUNCTURE: CPT

## 2025-05-28 PROCEDURE — 82962 GLUCOSE BLOOD TEST: CPT

## 2025-05-28 RX ADMIN — SODIUM CHLORIDE, PRESERVATIVE FREE 10 ML: 5 INJECTION INTRAVENOUS at 08:15

## 2025-05-28 RX ADMIN — INSULIN LISPRO 1 UNITS: 100 INJECTION, SOLUTION INTRAVENOUS; SUBCUTANEOUS at 20:47

## 2025-05-28 RX ADMIN — SODIUM CHLORIDE 40 MG: 9 INJECTION, SOLUTION INTRAMUSCULAR; INTRAVENOUS; SUBCUTANEOUS at 20:49

## 2025-05-28 RX ADMIN — PREDNISONE 5 MG: 5 TABLET ORAL at 20:43

## 2025-05-28 RX ADMIN — TRAMADOL HYDROCHLORIDE 50 MG: 50 TABLET, COATED ORAL at 14:08

## 2025-05-28 RX ADMIN — TAMSULOSIN HYDROCHLORIDE 0.4 MG: 0.4 CAPSULE ORAL at 08:12

## 2025-05-28 RX ADMIN — SUCRALFATE 1 G: 1 TABLET ORAL at 12:20

## 2025-05-28 RX ADMIN — SODIUM CHLORIDE 40 MG: 9 INJECTION, SOLUTION INTRAMUSCULAR; INTRAVENOUS; SUBCUTANEOUS at 08:12

## 2025-05-28 RX ADMIN — SUCRALFATE 1 G: 1 TABLET ORAL at 16:52

## 2025-05-28 RX ADMIN — INSULIN LISPRO 1 UNITS: 100 INJECTION, SOLUTION INTRAVENOUS; SUBCUTANEOUS at 12:20

## 2025-05-28 RX ADMIN — SODIUM CHLORIDE, PRESERVATIVE FREE 10 ML: 5 INJECTION INTRAVENOUS at 20:56

## 2025-05-28 RX ADMIN — SUCRALFATE 1 G: 1 TABLET ORAL at 06:14

## 2025-05-28 RX ADMIN — PREDNISONE 5 MG: 5 TABLET ORAL at 08:12

## 2025-05-28 RX ADMIN — LEFLUNOMIDE 10 MG: 10 TABLET ORAL at 08:12

## 2025-05-28 RX ADMIN — WHITE PETROLATUM 41 % TOPICAL OINTMENT: at 20:53

## 2025-05-28 RX ADMIN — INSULIN LISPRO 1 UNITS: 100 INJECTION, SOLUTION INTRAVENOUS; SUBCUTANEOUS at 17:37

## 2025-05-28 ASSESSMENT — PAIN SCALES - GENERAL
PAINLEVEL_OUTOF10: 0
PAINLEVEL_OUTOF10: 7
PAINLEVEL_OUTOF10: 0

## 2025-05-28 ASSESSMENT — PAIN DESCRIPTION - DESCRIPTORS: DESCRIPTORS: ACHING

## 2025-05-28 NOTE — PLAN OF CARE
Problem: Chronic Conditions and Co-morbidities  Goal: Patient's chronic conditions and co-morbidity symptoms are monitored and maintained or improved  5/28/2025 0253 by Meghan Tabor RN  Outcome: Progressing  5/27/2025 1514 by Keysha Tabor RN  Outcome: Progressing     Problem: Discharge Planning  Goal: Discharge to home or other facility with appropriate resources  5/28/2025 0253 by Meghan Tabor RN  Outcome: Progressing  5/27/2025 1514 by Keysha Tabor RN  Outcome: Progressing     Problem: Pain  Goal: Verbalizes/displays adequate comfort level or baseline comfort level  5/28/2025 0253 by Meghan Tabor RN  Outcome: Progressing  5/27/2025 1514 by Keysha Tabor RN  Outcome: Progressing     Problem: Skin/Tissue Integrity  Goal: Skin integrity remains intact  Description: 1.  Monitor for areas of redness and/or skin breakdown2.  Assess vascular access sites hourly3.  Every 4-6 hours minimum:  Change oxygen saturation probe site4.  Every 4-6 hours:  If on nasal continuous positive airway pressure, respiratory therapy assess nares and determine need for appliance change or resting period  5/28/2025 0253 by Meghan Tabor RN  Outcome: Progressing  5/27/2025 1514 by Keysha Tabor RN  Outcome: Progressing     Problem: Safety - Adult  Goal: Free from fall injury  5/28/2025 0253 by Meghan Tabor RN  Outcome: Progressing  5/27/2025 1514 by Keysha Tabor RN  Outcome: Progressing     Problem: ABCDS Injury Assessment  Goal: Absence of physical injury  5/28/2025 0253 by Meghan Tabor RN  Outcome: Progressing  5/27/2025 1514 by Keysha Tabor RN  Outcome: Progressing

## 2025-05-28 NOTE — PLAN OF CARE
Problem: Chronic Conditions and Co-morbidities  Goal: Patient's chronic conditions and co-morbidity symptoms are monitored and maintained or improved  5/28/2025 1049 by Keysha Tabor RN  Outcome: Progressing  5/28/2025 0253 by Meghan Tabor RN  Outcome: Progressing     Problem: Discharge Planning  Goal: Discharge to home or other facility with appropriate resources  5/28/2025 1049 by Keysha Tabor RN  Outcome: Progressing  5/28/2025 0253 by Meghan Tabor RN  Outcome: Progressing     Problem: Pain  Goal: Verbalizes/displays adequate comfort level or baseline comfort level  5/28/2025 1049 by Keysha Tabor RN  Outcome: Progressing  5/28/2025 0253 by Meghan Tabor RN  Outcome: Progressing     Problem: Skin/Tissue Integrity  Goal: Skin integrity remains intact  Description: 1.  Monitor for areas of redness and/or skin breakdown2.  Assess vascular access sites hourly3.  Every 4-6 hours minimum:  Change oxygen saturation probe site4.  Every 4-6 hours:  If on nasal continuous positive airway pressure, respiratory therapy assess nares and determine need for appliance change or resting period  5/28/2025 1049 by Keysha Tabor RN  Outcome: Progressing  5/28/2025 0253 by Meghan Tabor RN  Outcome: Progressing     Problem: Safety - Adult  Goal: Free from fall injury  5/28/2025 1049 by Keysha Tabor RN  Outcome: Progressing  5/28/2025 0253 by Meghan Tabor RN  Outcome: Progressing     Problem: ABCDS Injury Assessment  Goal: Absence of physical injury  5/28/2025 1049 by Keysha Tabor RN  Outcome: Progressing  5/28/2025 0253 by Meghan Tabor RN  Outcome: Progressing

## 2025-05-29 VITALS
SYSTOLIC BLOOD PRESSURE: 113 MMHG | HEART RATE: 102 BPM | DIASTOLIC BLOOD PRESSURE: 93 MMHG | OXYGEN SATURATION: 96 % | HEIGHT: 70 IN | RESPIRATION RATE: 18 BRPM | BODY MASS INDEX: 24.2 KG/M2 | WEIGHT: 169 LBS | TEMPERATURE: 99 F

## 2025-05-29 LAB
ANION GAP SERPL CALC-SCNC: 11 MMOL/L (ref 7–16)
BASOPHILS # BLD: 0.04 K/UL (ref 0–0.2)
BASOPHILS NFR BLD: 0.2 % (ref 0–2)
BUN SERPL-MCNC: 18 MG/DL (ref 8–23)
CALCIUM SERPL-MCNC: 8.8 MG/DL (ref 8.8–10.2)
CHLORIDE SERPL-SCNC: 104 MMOL/L (ref 98–107)
CO2 SERPL-SCNC: 25 MMOL/L (ref 20–29)
CREAT SERPL-MCNC: 1.23 MG/DL (ref 0.8–1.3)
DIFFERENTIAL METHOD BLD: ABNORMAL
EOSINOPHIL # BLD: 0.12 K/UL (ref 0–0.8)
EOSINOPHIL NFR BLD: 0.7 % (ref 0.5–7.8)
ERYTHROCYTE [DISTWIDTH] IN BLOOD BY AUTOMATED COUNT: 15 % (ref 11.9–14.6)
GLUCOSE BLD STRIP.AUTO-MCNC: 124 MG/DL (ref 65–100)
GLUCOSE BLD STRIP.AUTO-MCNC: 185 MG/DL (ref 65–100)
GLUCOSE SERPL-MCNC: 138 MG/DL (ref 70–99)
H PYLORI AG STL QL IA: NEGATIVE
HCT VFR BLD AUTO: 27.6 % (ref 41.1–50.3)
HGB BLD-MCNC: 8.6 G/DL (ref 13.6–17.2)
IMM GRANULOCYTES # BLD AUTO: 0.14 K/UL (ref 0–0.5)
IMM GRANULOCYTES NFR BLD AUTO: 0.8 % (ref 0–5)
LYMPHOCYTES # BLD: 1.43 K/UL (ref 0.5–4.6)
LYMPHOCYTES NFR BLD: 8.6 % (ref 13–44)
MCH RBC QN AUTO: 29.7 PG (ref 26.1–32.9)
MCHC RBC AUTO-ENTMCNC: 31.2 G/DL (ref 31.4–35)
MCV RBC AUTO: 95.2 FL (ref 82–102)
MONOCYTES # BLD: 1.14 K/UL (ref 0.1–1.3)
MONOCYTES NFR BLD: 6.9 % (ref 4–12)
NEUTS SEG # BLD: 13.67 K/UL (ref 1.7–8.2)
NEUTS SEG NFR BLD: 82.8 % (ref 43–78)
NRBC # BLD: 0.03 K/UL (ref 0–0.2)
PLATELET # BLD AUTO: 370 K/UL (ref 150–450)
PMV BLD AUTO: 9.1 FL (ref 9.4–12.3)
POTASSIUM SERPL-SCNC: 4.1 MMOL/L (ref 3.5–5.1)
RBC # BLD AUTO: 2.9 M/UL (ref 4.23–5.6)
SERVICE CMNT-IMP: ABNORMAL
SERVICE CMNT-IMP: ABNORMAL
SODIUM SERPL-SCNC: 139 MMOL/L (ref 136–145)
SPECIMEN SOURCE: NORMAL
WBC # BLD AUTO: 16.5 K/UL (ref 4.3–11.1)

## 2025-05-29 PROCEDURE — 80048 BASIC METABOLIC PNL TOTAL CA: CPT

## 2025-05-29 PROCEDURE — 97530 THERAPEUTIC ACTIVITIES: CPT

## 2025-05-29 PROCEDURE — 82962 GLUCOSE BLOOD TEST: CPT

## 2025-05-29 PROCEDURE — 6370000000 HC RX 637 (ALT 250 FOR IP): Performed by: STUDENT IN AN ORGANIZED HEALTH CARE EDUCATION/TRAINING PROGRAM

## 2025-05-29 PROCEDURE — 2580000003 HC RX 258: Performed by: STUDENT IN AN ORGANIZED HEALTH CARE EDUCATION/TRAINING PROGRAM

## 2025-05-29 PROCEDURE — 85025 COMPLETE CBC W/AUTO DIFF WBC: CPT

## 2025-05-29 PROCEDURE — 36415 COLL VENOUS BLD VENIPUNCTURE: CPT

## 2025-05-29 PROCEDURE — 6360000002 HC RX W HCPCS: Performed by: STUDENT IN AN ORGANIZED HEALTH CARE EDUCATION/TRAINING PROGRAM

## 2025-05-29 PROCEDURE — 2500000003 HC RX 250 WO HCPCS: Performed by: STUDENT IN AN ORGANIZED HEALTH CARE EDUCATION/TRAINING PROGRAM

## 2025-05-29 RX ORDER — SUCRALFATE 1 G/1
1 TABLET ORAL
Qty: 120 TABLET | Refills: 0 | Status: SHIPPED | OUTPATIENT
Start: 2025-05-29

## 2025-05-29 RX ORDER — PANTOPRAZOLE SODIUM 40 MG/1
40 TABLET, DELAYED RELEASE ORAL
Qty: 60 TABLET | Refills: 0 | Status: SHIPPED | OUTPATIENT
Start: 2025-05-29 | End: 2025-06-28

## 2025-05-29 RX ORDER — FERROUS SULFATE 325(65) MG
325 TABLET ORAL
Qty: 30 TABLET | Refills: 0 | Status: SHIPPED | OUTPATIENT
Start: 2025-05-29 | End: 2025-06-28

## 2025-05-29 RX ORDER — DOCUSATE SODIUM 100 MG/1
100 CAPSULE, LIQUID FILLED ORAL 2 TIMES DAILY
Qty: 60 CAPSULE | Refills: 0 | Status: SHIPPED | OUTPATIENT
Start: 2025-05-29 | End: 2025-06-28

## 2025-05-29 RX ADMIN — SUCRALFATE 1 G: 1 TABLET ORAL at 06:02

## 2025-05-29 RX ADMIN — SODIUM CHLORIDE, PRESERVATIVE FREE 10 ML: 5 INJECTION INTRAVENOUS at 09:36

## 2025-05-29 RX ADMIN — SODIUM CHLORIDE 40 MG: 9 INJECTION, SOLUTION INTRAMUSCULAR; INTRAVENOUS; SUBCUTANEOUS at 08:54

## 2025-05-29 RX ADMIN — INSULIN LISPRO 1 UNITS: 100 INJECTION, SOLUTION INTRAVENOUS; SUBCUTANEOUS at 12:02

## 2025-05-29 RX ADMIN — CARBOXYMETHYLCELLULOSE SODIUM 1 DROP: 10 GEL OPHTHALMIC at 09:00

## 2025-05-29 RX ADMIN — LEFLUNOMIDE 10 MG: 10 TABLET ORAL at 08:55

## 2025-05-29 RX ADMIN — TRAMADOL HYDROCHLORIDE 50 MG: 50 TABLET, COATED ORAL at 08:53

## 2025-05-29 RX ADMIN — SUCRALFATE 1 G: 1 TABLET ORAL at 10:40

## 2025-05-29 RX ADMIN — PREDNISONE 5 MG: 5 TABLET ORAL at 08:54

## 2025-05-29 RX ADMIN — TAMSULOSIN HYDROCHLORIDE 0.4 MG: 0.4 CAPSULE ORAL at 08:53

## 2025-05-29 ASSESSMENT — PAIN DESCRIPTION - ORIENTATION: ORIENTATION: RIGHT;LEFT

## 2025-05-29 ASSESSMENT — PAIN SCALES - GENERAL
PAINLEVEL_OUTOF10: 7
PAINLEVEL_OUTOF10: 0

## 2025-05-29 ASSESSMENT — PAIN DESCRIPTION - DESCRIPTORS: DESCRIPTORS: ACHING

## 2025-05-29 ASSESSMENT — PAIN DESCRIPTION - LOCATION: LOCATION: NECK;SHOULDER

## 2025-05-29 NOTE — DISCHARGE SUMMARY
Creatinine 1.23 0.80 - 1.30 MG/DL    Est, Glom Filt Rate 60 (L) >60 ml/min/1.73m2    Calcium 8.8 8.8 - 10.2 MG/DL   POCT Glucose    Collection Time: 05/29/25  7:16 AM   Result Value Ref Range    POC Glucose 124 (H) 65 - 100 mg/dL    Performed by: Luis    POCT Glucose    Collection Time: 05/29/25 11:04 AM   Result Value Ref Range    POC Glucose 185 (H) 65 - 100 mg/dL    Performed by: Loretta        No results for input(s): \"COVID19\" in the last 72 hours.    Recent Vital Data:  Patient Vitals for the past 24 hrs:   Temp Pulse Resp BP SpO2   05/29/25 0923 -- -- 18 -- --   05/29/25 0719 99 °F (37.2 °C) (!) 102 16 (!) 113/93 96 %   05/29/25 0630 98.6 °F (37 °C) (!) 110 18 125/75 95 %   05/28/25 1942 98.8 °F (37.1 °C) (!) 107 19 121/79 95 %   05/28/25 1614 98.8 °F (37.1 °C) (!) 111 18 127/69 97 %   05/28/25 1438 -- -- 18 -- --       Oxygen Therapy  SpO2: 96 %  Pulse via Oximetry: 88 beats per minute  Pulse Oximeter Device Mode: Continuous  Pulse Oximeter Device Location: Finger  O2 Device: None (Room air)  O2 Flow Rate (L/min): 3 L/min    Estimated body mass index is 24.25 kg/m² as calculated from the following:    Height as of this encounter: 1.778 m (5' 10\").    Weight as of this encounter: 76.7 kg (169 lb).    Intake/Output Summary (Last 24 hours) at 5/29/2025 1119  Last data filed at 5/29/2025 0720  Gross per 24 hour   Intake --   Output 1950 ml   Net -1950 ml         Physical Exam:    General:    Well nourished.  No overt distress  Head:  Normocephalic, atraumatic  Eyes:  R eye closed/prosthetic present   HENT:  Nares appear normal, no drainage.  Moist mucous membranes  Neck:  No restricted ROM.  Trachea midline  CV:   RRR.  No m/r/g.  No JVD  Lungs:   CTAB.  No wheezing, rhonchi, or rales.  Respirations even, unlabored  Abdomen:   Soft, nontender, nondistended.    Extremities: S/P left great toe amputation   Skin:     Chronic BLE skin changes without erythema or edema   Neuro:  CN II-XII grossly

## 2025-05-29 NOTE — CARE COORDINATION
Patient with discharge order for today. Patient/family refuse home health referral. Patient has met all treatment goals and milestones for discharge. Patient/family in agreement with discharge plan. Family to provide transportation home. CM following until patient is discharged.      05/29/25 1127   Services At/After Discharge   Transition of Care Consult (CM Consult) Discharge Planning   Services At/After Discharge None    Resource Information Provided? No   Mode of Transport at Discharge Self   Confirm Follow Up Transport Self   Condition of Participation: Discharge Planning   The Plan for Transition of Care is related to the following treatment goals: Return to baseline   The Patient and/or Patient Representative was provided with a Choice of Provider? Patient   The Patient and/Or Patient Representative agree with the Discharge Plan? Yes   Freedom of Choice list was provided with basic dialogue that supports the patient's individualized plan of care/goals, treatment preferences, and shares the quality data associated with the providers?  Yes
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
## 2025-05-29 NOTE — PLAN OF CARE
Problem: Chronic Conditions and Co-morbidities  Goal: Patient's chronic conditions and co-morbidity symptoms are monitored and maintained or improved  5/29/2025 1209 by Keysha Tabor RN  Outcome: Progressing  5/29/2025 0419 by Meghan Tabor RN  Outcome: Progressing     Problem: Discharge Planning  Goal: Discharge to home or other facility with appropriate resources  5/29/2025 1209 by Keysha Tabor RN  Outcome: Progressing  5/29/2025 0419 by Meghan Tabor RN  Outcome: Progressing     Problem: Pain  Goal: Verbalizes/displays adequate comfort level or baseline comfort level  5/29/2025 1209 by Keysha Tabor RN  Outcome: Progressing  5/29/2025 0419 by Meghan Tabor RN  Outcome: Progressing     Problem: Skin/Tissue Integrity  Goal: Skin integrity remains intact  Description: 1.  Monitor for areas of redness and/or skin breakdown2.  Assess vascular access sites hourly3.  Every 4-6 hours minimum:  Change oxygen saturation probe site4.  Every 4-6 hours:  If on nasal continuous positive airway pressure, respiratory therapy assess nares and determine need for appliance change or resting period  5/29/2025 1209 by Keysha Tabor RN  Outcome: Progressing  5/29/2025 0419 by Meghan Tabor RN  Outcome: Progressing     Problem: Safety - Adult  Goal: Free from fall injury  5/29/2025 1209 by Keysha Tabor RN  Outcome: Progressing  5/29/2025 0419 by Meghan Tabor RN  Outcome: Progressing     Problem: ABCDS Injury Assessment  Goal: Absence of physical injury  5/29/2025 1209 by Keysha Tabor RN  Outcome: Progressing  5/29/2025 0419 by Meghan Tabor RN  Outcome: Progressing

## 2025-06-03 ENCOUNTER — TELEPHONE (OUTPATIENT)
Age: 80
End: 2025-06-03

## 2025-06-03 NOTE — TELEPHONE ENCOUNTER
Per staff message from Dr. Perez  Please schedule patient for repeat EGD in 8 weeks. Called the patient, no answer and no voicemail set up. First attempt.

## 2025-06-09 ENCOUNTER — TELEPHONE (OUTPATIENT)
Age: 80
End: 2025-06-09

## 2025-06-09 NOTE — TELEPHONE ENCOUNTER
Per staff message from Dr. Perez  Please schedule patient for repeat EGD in 8 weeks. Called the patient, no answer and no voicemail set up. Second attempt.

## 2025-06-13 ENCOUNTER — TELEPHONE (OUTPATIENT)
Age: 80
End: 2025-06-13

## 2025-06-13 NOTE — TELEPHONE ENCOUNTER
Per staff message from Dr. Perez  Please schedule patient for repeat EGD in 8 weeks. Called the patient, no answer and no voicemail set up. Third attempt.

## 2025-06-17 ENCOUNTER — TELEPHONE (OUTPATIENT)
Age: 80
End: 2025-06-17

## 2025-06-17 ENCOUNTER — PREP FOR PROCEDURE (OUTPATIENT)
Age: 80
End: 2025-06-17

## 2025-06-17 DIAGNOSIS — K92.1 MELENA: ICD-10-CM

## 2025-06-17 RX ORDER — SODIUM CHLORIDE 0.9 % (FLUSH) 0.9 %
5-40 SYRINGE (ML) INJECTION PRN
Status: CANCELLED | OUTPATIENT
Start: 2025-06-17

## 2025-06-17 RX ORDER — SODIUM CHLORIDE 9 MG/ML
25 INJECTION, SOLUTION INTRAVENOUS PRN
Status: CANCELLED | OUTPATIENT
Start: 2025-06-17

## 2025-06-17 RX ORDER — SODIUM CHLORIDE 0.9 % (FLUSH) 0.9 %
5-40 SYRINGE (ML) INJECTION EVERY 12 HOURS SCHEDULED
Status: CANCELLED | OUTPATIENT
Start: 2025-06-17

## 2025-06-17 NOTE — TELEPHONE ENCOUNTER
Patient's wife/Maria Guadalupe left a voicemail requesting a call back. Returned call at [phone], no answer and voicemail full. Will attempt to call again.

## 2025-06-17 NOTE — TELEPHONE ENCOUNTER
The patient's wife returned call to schedule follow up EGD with Dr. Perez on Thursday 7/17/2025 at [facility]. Patient to hold vitamin/iron 5 days prior. Reviewed instructions and sent via [email or web address].     The day before you test->     You should NOT eat or drink after midnight.

## 2025-06-24 PROBLEM — R79.89 ELEVATED TROPONIN: Status: RESOLVED | Noted: 2025-05-25 | Resolved: 2025-06-24

## 2025-07-01 NOTE — PROGRESS NOTES
Patient verified name, , and procedure.    Type: 1a; abbreviated assessment per anesthesia guidelines    Labs per anesthesia: SQBS DOS    Instructed pt that they will be notified the day before their procedure by the GI Lab for time of arrival if their procedure is [place] and Pre-op for [place] cases. Arrival times should be called by 5 pm. If no phone is received the patient should contact their respective hospital. The GI lab telephone number is [phone] and ES Pre-op is [phone].     Follow diet and prep instructions per office. Nothing to eat or drink after midnight (this excludes prep).      Bath or shower the night before and the am of surgery with non-moisturizing soap. No lotions, oils, powders, cologne on skin. No make up, eye make up or jewelry. Wear loose fitting comfortable, clean clothing.     Must have adult present in building the entire time .     Medications for the day of procedure ARAVA, METFORMIN, PROTONIX, CALAN, CARAFATE AND TRESIBA 29 UNITS EVENING PRIOR TO DOS, patient to hold ALL VITAMINS AND SUPPLEMENTS X 7 DAYS per anesthesia guidelines.     The following discharge instructions reviewed with patient: medication given during procedure may cause drowsiness for several hours, therefore, do not drive or operate machinery for remainder of the day. You may not drink alcohol on the day of your procedure, please resume regular diet and activity unless otherwise directed. You may experience abdominal distention for several hours that is relieved by the passage of gas. Contact your physician if you have any of the following: fever or chills, severe abdominal pain or excessive amount of bleeding or a large amount when having a bowel movement. Occasional specks of blood with bowel movement would not be unusual.

## 2025-07-16 ENCOUNTER — ANESTHESIA EVENT (OUTPATIENT)
Dept: ENDOSCOPY | Age: 80
End: 2025-07-16
Payer: MEDICARE

## 2025-07-16 RX ORDER — LIDOCAINE HYDROCHLORIDE 10 MG/ML
1 INJECTION, SOLUTION INFILTRATION; PERINEURAL
Status: CANCELLED | OUTPATIENT
Start: 2025-07-16

## 2025-07-16 RX ORDER — SODIUM CHLORIDE 0.9 % (FLUSH) 0.9 %
5-40 SYRINGE (ML) INJECTION EVERY 12 HOURS SCHEDULED
Status: CANCELLED | OUTPATIENT
Start: 2025-07-16

## 2025-07-16 RX ORDER — SODIUM CHLORIDE 0.9 % (FLUSH) 0.9 %
5-40 SYRINGE (ML) INJECTION PRN
Status: CANCELLED | OUTPATIENT
Start: 2025-07-16

## 2025-07-16 RX ORDER — SODIUM CHLORIDE, SODIUM LACTATE, POTASSIUM CHLORIDE, CALCIUM CHLORIDE 600; 310; 30; 20 MG/100ML; MG/100ML; MG/100ML; MG/100ML
INJECTION, SOLUTION INTRAVENOUS CONTINUOUS
Status: CANCELLED | OUTPATIENT
Start: 2025-07-16

## 2025-07-16 RX ORDER — SODIUM CHLORIDE 9 MG/ML
INJECTION, SOLUTION INTRAVENOUS PRN
Status: CANCELLED | OUTPATIENT
Start: 2025-07-16

## 2025-07-16 NOTE — PROGRESS NOTES
Dear Ney Allen,     Thank you for choosing [facility] for your upcoming endoscopic procedure. We appreciate the trust you have placed in us to provide your care.     Important Details Regarding Your Upcoming Procedure:     Place: Main lobby of [address] [address].     Preparation: Refer to both provider and pre-assessment and prep instructions.     Arrival: Please arrive at the main entrance of the hospital, located past the statue of [place]. Once inside, proceed to the registration desk on the left in the main lobby.     Time of arrival: 0815am on Thursday, 7/16/2025    Accompaniment: Please note that you will need a  who is 18 years old or greater to stay with you throughout the entire process, your  must not leave while you are in our care. This is a requirement for your safety and care.    Cancellation: If you are unable to keep this appointment, please contact the doctor's office associated with your procedure as soon as possible. We look forward to providing you with exceptional care and service. If you have any questions or concerns, please do not hesitate to reach out to us.     Sincerely, [facility] Endoscopy Team

## 2025-07-16 NOTE — PROGRESS NOTES
RN called Pt to confirm new appointment time of 0700, arrival time 0600, location,  requirement, and instructions for registration at the hospital.  Pt verbalized understanding.

## 2025-07-17 ENCOUNTER — HOSPITAL ENCOUNTER (OUTPATIENT)
Age: 80
Discharge: HOME | End: 2025-07-17
Attending: INTERNAL MEDICINE | Admitting: INTERNAL MEDICINE
Payer: MEDICARE

## 2025-07-17 ENCOUNTER — ANESTHESIA (OUTPATIENT)
Dept: ENDOSCOPY | Age: 80
End: 2025-07-17
Payer: MEDICARE

## 2025-07-17 VITALS
RESPIRATION RATE: 13 BRPM | HEART RATE: 88 BPM | HEIGHT: 71 IN | DIASTOLIC BLOOD PRESSURE: 86 MMHG | TEMPERATURE: 98 F | SYSTOLIC BLOOD PRESSURE: 161 MMHG | WEIGHT: 168 LBS | BODY MASS INDEX: 23.52 KG/M2 | OXYGEN SATURATION: 97 %

## 2025-07-17 DIAGNOSIS — K92.1 MELENA: ICD-10-CM

## 2025-07-17 LAB
GLUCOSE BLD STRIP.AUTO-MCNC: 81 MG/DL (ref 65–100)
SERVICE CMNT-IMP: NORMAL

## 2025-07-17 PROCEDURE — 3609012400 HC EGD TRANSORAL BIOPSY SINGLE/MULTIPLE: Performed by: INTERNAL MEDICINE

## 2025-07-17 PROCEDURE — 3700000000 HC ANESTHESIA ATTENDED CARE: Performed by: INTERNAL MEDICINE

## 2025-07-17 PROCEDURE — 7100000011 HC PHASE II RECOVERY - ADDTL 15 MIN: Performed by: INTERNAL MEDICINE

## 2025-07-17 PROCEDURE — 6360000002 HC RX W HCPCS: Performed by: NURSE ANESTHETIST, CERTIFIED REGISTERED

## 2025-07-17 PROCEDURE — 43239 EGD BIOPSY SINGLE/MULTIPLE: CPT | Performed by: INTERNAL MEDICINE

## 2025-07-17 PROCEDURE — 88342 IMHCHEM/IMCYTCHM 1ST ANTB: CPT

## 2025-07-17 PROCEDURE — 2580000003 HC RX 258: Performed by: ANESTHESIOLOGY

## 2025-07-17 PROCEDURE — 88305 TISSUE EXAM BY PATHOLOGIST: CPT

## 2025-07-17 PROCEDURE — 7100000010 HC PHASE II RECOVERY - FIRST 15 MIN: Performed by: INTERNAL MEDICINE

## 2025-07-17 PROCEDURE — 82962 GLUCOSE BLOOD TEST: CPT

## 2025-07-17 PROCEDURE — 2709999900 HC NON-CHARGEABLE SUPPLY: Performed by: INTERNAL MEDICINE

## 2025-07-17 RX ORDER — SODIUM CHLORIDE 9 MG/ML
INJECTION, SOLUTION INTRAVENOUS PRN
Status: DISCONTINUED | OUTPATIENT
Start: 2025-07-17 | End: 2025-07-17 | Stop reason: HOSPADM

## 2025-07-17 RX ORDER — PROPOFOL 10 MG/ML
INJECTION, EMULSION INTRAVENOUS
Status: DISCONTINUED | OUTPATIENT
Start: 2025-07-17 | End: 2025-07-17 | Stop reason: SDUPTHER

## 2025-07-17 RX ORDER — IBUPROFEN 600 MG/1
1 TABLET ORAL PRN
Status: DISCONTINUED | OUTPATIENT
Start: 2025-07-17 | End: 2025-07-17 | Stop reason: HOSPADM

## 2025-07-17 RX ORDER — SODIUM CHLORIDE 0.9 % (FLUSH) 0.9 %
5-40 SYRINGE (ML) INJECTION EVERY 12 HOURS SCHEDULED
Status: DISCONTINUED | OUTPATIENT
Start: 2025-07-17 | End: 2025-07-17 | Stop reason: HOSPADM

## 2025-07-17 RX ORDER — SODIUM CHLORIDE, SODIUM LACTATE, POTASSIUM CHLORIDE, CALCIUM CHLORIDE 600; 310; 30; 20 MG/100ML; MG/100ML; MG/100ML; MG/100ML
INJECTION, SOLUTION INTRAVENOUS CONTINUOUS
Status: DISCONTINUED | OUTPATIENT
Start: 2025-07-17 | End: 2025-07-17 | Stop reason: HOSPADM

## 2025-07-17 RX ORDER — SODIUM CHLORIDE 0.9 % (FLUSH) 0.9 %
5-40 SYRINGE (ML) INJECTION PRN
Status: DISCONTINUED | OUTPATIENT
Start: 2025-07-17 | End: 2025-07-17 | Stop reason: HOSPADM

## 2025-07-17 RX ORDER — DEXTROSE MONOHYDRATE 100 MG/ML
INJECTION, SOLUTION INTRAVENOUS CONTINUOUS PRN
Status: DISCONTINUED | OUTPATIENT
Start: 2025-07-17 | End: 2025-07-17 | Stop reason: HOSPADM

## 2025-07-17 RX ORDER — LIDOCAINE HYDROCHLORIDE 20 MG/ML
INJECTION, SOLUTION EPIDURAL; INFILTRATION; INTRACAUDAL; PERINEURAL
Status: DISCONTINUED | OUTPATIENT
Start: 2025-07-17 | End: 2025-07-17 | Stop reason: SDUPTHER

## 2025-07-17 RX ADMIN — PROPOFOL 50 MG: 10 INJECTION, EMULSION INTRAVENOUS at 07:14

## 2025-07-17 RX ADMIN — SODIUM CHLORIDE, SODIUM LACTATE, POTASSIUM CHLORIDE, AND CALCIUM CHLORIDE: .6; .31; .03; .02 INJECTION, SOLUTION INTRAVENOUS at 06:28

## 2025-07-17 RX ADMIN — LIDOCAINE HYDROCHLORIDE 100 MG: 20 INJECTION, SOLUTION EPIDURAL; INFILTRATION; INTRACAUDAL; PERINEURAL at 07:12

## 2025-07-17 RX ADMIN — PROPOFOL 50 MG: 10 INJECTION, EMULSION INTRAVENOUS at 07:12

## 2025-07-17 ASSESSMENT — PAIN - FUNCTIONAL ASSESSMENT: PAIN_FUNCTIONAL_ASSESSMENT: 0-10

## 2025-07-17 NOTE — ANESTHESIA PRE PROCEDURE
airway given severely limited neck ROM from RA): history of difficult intubation.  Airway: Mallampati: III  TM distance: >3 FB   Neck ROM: limited  Comment: Very limited neck ROM  Mouth opening: > = 3 FB   Dental:    (+) poor dentition      Pulmonary:Negative Pulmonary ROS and normal exam  breath sounds clear to auscultation    Sleep apnea: snores.                         Cardiovascular:  Exercise tolerance: no interval change  (+) hypertension:, hyperlipidemia      ECG reviewed  Rhythm: regular  Rate: normal                    Neuro/Psych:                ROS comment: Chronic pain, uses Tramadol  Prosthetic R eye  Neuropathy GI/Hepatic/Renal:   (+) renal disease: CRI          Endo/Other:    (+) DiabetesType II DM, blood dyscrasia: anemia, arthritis: rheumatoid..                  ROS comment: Takes low dose Prednisone daily for RA     Abdominal:             Vascular:   + PVD, aortic or cerebral (significant PAD).       Other Findings:         Anesthesia Plan      TIVA     ASA 3       Induction: intravenous.      Anesthetic plan and risks discussed with patient.                      Bernard Jaquez MD   7/17/2025

## 2025-07-17 NOTE — DISCHARGE INSTRUCTIONS
Gastrointestinal Esophagogastroduodenoscopy (EGD) - Upper Exam Discharge Instructions    1. Call Dr. Perez at [phone] for any problems or questions.    2. Contact the doctor's office for follow up appointment as directed.    3. Medication may cause drowsiness for several hours, therefore:  Do not drive or operate machinery for remainder of the day.    No alcohol today.  Do not make any important or legal decisions for 24 hours.  Do not sign any legal documents for 24 hours.    5. Ordinarily, you may resume regular diet and activity after exam unless otherwise specified by your physician.    6. For mild soreness in your throat you may use Cepacol throat lozenges or warm salt-water gargles as needed.    Any additional instructions:    Await pathology results

## 2025-07-17 NOTE — ANESTHESIA POSTPROCEDURE EVALUATION
Department of Anesthesiology  Postprocedure Note    Patient: Ney Allen  MRN: [id number]  YOB: 1945  Date of evaluation: 7/17/2025    Procedure Summary       Date: 07/17/25 Room / Location: [facility] ENDO 03 / [facility] ENDOSCOPY    Anesthesia Start: 0710 Anesthesia Stop: 0723    Procedure: ESOPHAGOGASTRODUODENOSCOPY BIOPSY (Upper GI Region) Diagnosis:       Melena      (Melena [K92.1])    Surgeons: Elsa Perez MD Responsible Provider: Bernard Jaquez MD    Anesthesia Type: TIVA ASA Status: 3            Anesthesia Type: No value filed.    Adrian Phase I: Adrian Score: 10    Adrian Phase II: Adrian Score: 10    Anesthesia Post Evaluation    Patient location during evaluation: PACU  Patient participation: complete - patient participated  Level of consciousness: awake and alert  Airway patency: patent  Nausea: well controlled.  Cardiovascular status: acceptable.  Respiratory status: acceptable  Hydration status: stable  Pain management: adequate    No notable events documented.

## 2025-07-17 NOTE — H&P
GASTROENTEROLOGY H&P    Ney Allen is 79 y.o. y/o male here for peptic ulcer disease.        Past Medical History:   Diagnosis Date    Anemia, unspecified     Arthritis     RA; spine/ general joints    Diabetes (HCC) dx 1995    type 2--  does not check sqbs at home    Hypertension     well controlled with meds    Iron deficiency anemia secondary to inadequate dietary iron intake 03/06/2015    Open wound of lower extremity 03/11/2020    resolved per pt    Rash and other nonspecific skin eruption     Rheumatoid arthritis(714.0) 03/06/2015    Statin intolerance 01/06/2017    Unspecified essential hypertension 03/09/2015    controlled with med     Past Surgical History:   Procedure Laterality Date    CERVICAL DISCECTOMY  11/30/2012    C3-C4;  Dr. Bernal    COLONOSCOPY      HEENT  age 14    R eye prosthesis    TOE AMPUTATION Left 1/31/2024    LEFT 1ST TOE AMPUTATION performed by Jonatan Pineda MD at [facility] MAIN OR    UPPER GASTROINTESTINAL ENDOSCOPY N/A 5/26/2025    ESOPHAGOGASTRODUODENOSCOPY/Gold probe/Epi injection/Purastat performed by Elsa Perez MD at [facility] MAIN OR     Family History   Problem Relation Age of Onset    Osteoarthritis Sister     Osteoarthritis Sister     Rheum Arthritis Mother     Cancer Father         Prostate CA    Cancer Sister         breast    Hypertension Brother     Diabetes Brother     Osteoarthritis Brother      Social History     Tobacco Use    Smoking status: Never    Smokeless tobacco: Former     Types: Chew     Quit date: 10/2023   Vaping Use    Vaping status: Never Used   Substance Use Topics    Alcohol use: No     Alcohol/week: 0.0 standard drinks of alcohol    Drug use: Yes         PE:   Vitals:    07/17/25 0619   BP: (!) 173/75   Pulse: 90   Resp: 22   Temp: 98.6 °F (37 °C)   SpO2: 99%      General:  The patient appears well-nourished, and is in no acute distress.    HEENT:  Normocephalic, atraumatic. No sclerae icterus.   Respiratory: Respiratory effort is  normal.     Cardiovascular:  Regular rate and rhythm.     Abdomen:  Soft, non tender to palpation. No distention.     Assessment and Plan:   Peptic ulcer disease     Proceed with EGD. Further recommendations to follow procedure.       Elsa Perez MD  [facility]

## 2025-07-28 DIAGNOSIS — K25.9 GASTRIC ULCER WITHOUT HEMORRHAGE OR PERFORATION, UNSPECIFIED CHRONICITY: Primary | ICD-10-CM

## 2025-07-28 DIAGNOSIS — K21.9 GASTROESOPHAGEAL REFLUX DISEASE, UNSPECIFIED WHETHER ESOPHAGITIS PRESENT: ICD-10-CM

## 2025-07-28 RX ORDER — PANTOPRAZOLE SODIUM 40 MG/1
40 TABLET, DELAYED RELEASE ORAL
Qty: 60 TABLET | Refills: 2 | Status: SHIPPED | OUTPATIENT
Start: 2025-07-28

## (undated) DEVICE — KENDALL RADIOLUCENT FOAM MONITORING ELECTRODE RECTANGULAR SHAPE: Brand: KENDALL

## (undated) DEVICE — GAUZE,SPONGE,4"X4",12PLY,WOVEN,NS,LF: Brand: MEDLINE

## (undated) DEVICE — DISPOSABLE BIOPSY VALVE MAJ-1555: Brand: SINGLE USE BIOPSY VALVE (STERILE)

## (undated) DEVICE — CONTAINER FORMALIN PREFILLED 10% NBF 60ML

## (undated) DEVICE — MOUTHPIECE ENDOSCP L CTRL OPN AND SIDE PORTS DISP

## (undated) DEVICE — AGENT HEMOSTATIC 3 ML SYNTH FOR MILD MOD BLEED STRL PURASTAT

## (undated) DEVICE — CONNECTOR TBNG OD5-7MM O2 END DISP

## (undated) DEVICE — SINGLE PORT MANIFOLD: Brand: NEPTUNE 2

## (undated) DEVICE — BLOCK BITE AD 60FR W/ VELC STRP ADDRESSES MOST PT AND

## (undated) DEVICE — SOLUTION IRRIG 1000ML H2O PIC PLAS SHATTERPROOF CONTAINER

## (undated) DEVICE — SOLUTION IRRIG 1000ML 0.9% SOD CHL USP POUR PLAS BTL

## (undated) DEVICE — ENDOSCOPIC KIT 1.1+ OP4 CA DE 2 GWN AAMI LEVEL 3

## (undated) DEVICE — BANDAGE,GAUZE,BULKEE II,4.5"X4.1YD,STRL: Brand: MEDLINE

## (undated) DEVICE — GLOVE,SURG,TRIUMPH MICRO,LTX,PF,7.5: Brand: MEDLINE

## (undated) DEVICE — CATHETER SPRY L 2200 MM DIA1.8 MM SYR 5 ML 0.4 INDIGO BLU

## (undated) DEVICE — VASCULAR AMP, I&D: Brand: MEDLINE INDUSTRIES, INC.

## (undated) DEVICE — FORCEPS BX L240CM JAW DIA2.8MM L CAP W/ NDL MIC MESH TOOTH

## (undated) DEVICE — GAUZE,SPONGE,4"X4",16PLY,STRL,LF,10/TRAY: Brand: MEDLINE

## (undated) DEVICE — AIRLIFE™ OXYGEN TUBING 7 FEET (2.1 M) CRUSH RESISTANT OXYGEN TUBING, VINYL TIPPED: Brand: AIRLIFE™

## (undated) DEVICE — DRESSING,GAUZE,XEROFORM,CURAD,1"X8",ST: Brand: CURAD

## (undated) DEVICE — SUTURE VCRL SZ 2-0 L27IN ABSRB UD L26MM SH 1/2 CIR J417H

## (undated) DEVICE — BIPOLAR ELECTROHEMOSTASIS CATHETER: Brand: GOLD PROBE

## (undated) DEVICE — SUTURE ETHLN SZ 2-0 L18IN NONABSORBABLE BLK L26MM PS 3/8 585H